# Patient Record
Sex: MALE | Race: WHITE | Employment: OTHER | URBAN - METROPOLITAN AREA
[De-identification: names, ages, dates, MRNs, and addresses within clinical notes are randomized per-mention and may not be internally consistent; named-entity substitution may affect disease eponyms.]

---

## 2018-02-27 ENCOUNTER — OFFICE VISIT (OUTPATIENT)
Dept: FAMILY MEDICINE | Facility: CLINIC | Age: 58
End: 2018-02-27
Payer: COMMERCIAL

## 2018-02-27 VITALS
OXYGEN SATURATION: 100 % | RESPIRATION RATE: 16 BRPM | BODY MASS INDEX: 35.53 KG/M2 | HEART RATE: 92 BPM | TEMPERATURE: 98.2 F | DIASTOLIC BLOOD PRESSURE: 88 MMHG | WEIGHT: 248.2 LBS | HEIGHT: 70 IN | SYSTOLIC BLOOD PRESSURE: 138 MMHG

## 2018-02-27 DIAGNOSIS — Z13.220 LIPID SCREENING: ICD-10-CM

## 2018-02-27 DIAGNOSIS — G44.201 ACUTE INTRACTABLE TENSION-TYPE HEADACHE: Primary | ICD-10-CM

## 2018-02-27 DIAGNOSIS — Z12.11 SPECIAL SCREENING FOR MALIGNANT NEOPLASMS, COLON: ICD-10-CM

## 2018-02-27 DIAGNOSIS — Z13.1 SCREENING FOR DIABETES MELLITUS: ICD-10-CM

## 2018-02-27 DIAGNOSIS — Z11.59 ENCOUNTER FOR HEPATITIS C SCREENING TEST FOR LOW RISK PATIENT: ICD-10-CM

## 2018-02-27 PROBLEM — Z71.89 ADVANCED DIRECTIVES, COUNSELING/DISCUSSION: Status: ACTIVE | Noted: 2018-02-27

## 2018-02-27 PROCEDURE — 99213 OFFICE O/P EST LOW 20 MIN: CPT | Performed by: FAMILY MEDICINE

## 2018-02-27 ASSESSMENT — PAIN SCALES - GENERAL: PAINLEVEL: MILD PAIN (3)

## 2018-02-27 NOTE — MR AVS SNAPSHOT
After Visit Summary   2/27/2018    Augusto Meeks    MRN: [id number]           Patient Information     Date Of Birth          1960        Visit Information        Provider Department      2/27/2018 2:15 PM Singh Valencia MD [facility] [place]        Today's Diagnoses     Acute intractable tension-type headache    -  1    Special screening for malignant neoplasms, colon        Encounter for hepatitis C screening test for low risk patient        Lipid screening        Screening for diabetes mellitus           Follow-ups after your visit        Additional Services     GASTROENTEROLOGY ADULT REF PROCEDURE ONLY [facility] [phone]       Last Lab Result: No results found for: CR  There is no height or weight on file to calculate BMI.      Patient will be contacted to schedule procedure.     Please be aware that coverage of these services is subject to the terms and limitations of your health insurance plan.  Call member services at your health plan with any benefit or coverage questions.  Any procedures must be performed at a [place] facility OR coordinated by your clinic's referral office.    Please bring the following with you to your appointment:    (1) Any X-Rays, CTs or MRIs which have been performed.  Contact the facility where they were done to arrange for  prior to your scheduled appointment.    (2) List of current medications   (3) This referral request   (4) Any documents/labs given to you for this referral                  Future tests that were ordered for you today     Open Future Orders        Priority Expected Expires Ordered    Hepatitis C Screen Reflex to HCV RNA Quant and Genotype Routine  2/27/2019 2/27/2018    Lipid panel reflex to direct LDL Fasting Routine  2/27/2019 2/27/2018    GASTROENTEROLOGY ADULT REF PROCEDURE ONLY [facility] [phone] Routine  2/27/2019 2/27/2018    Basic metabolic panel Routine  2/27/2019 2/27/2018            Who  "to contact     If you have questions or need follow up information about today's clinic visit or your schedule please contact [facility] directly at [phone].  Normal or non-critical lab and imaging results will be communicated to you by MyChart, letter or phone within 4 business days after the clinic has received the results. If you do not hear from us within 7 days, please contact the clinic through MyChart or phone. If you have a critical or abnormal lab result, we will notify you by phone as soon as possible.  Submit refill requests through [employer] or call your pharmacy and they will forward the refill request to us. Please allow 3 business days for your refill to be completed.          Additional Information About Your Visit        [employer] Information     [employer] gives you secure access to your electronic health record. If you see a primary care provider, you can also send messages to your care team and make appointments. If you have questions, please call your primary care clinic.  If you do not have a primary care provider, please call [phone] and they will assist you.        Care EveryWhere ID     This is your Care EveryWhere ID. This could be used by other organizations to access your [place] medical records  [id number]        Your Vitals Were     Pulse Temperature Respirations Height Pulse Oximetry BMI (Body Mass Index)    92 98.2  F (36.8  C) (Oral) 16 5' 10\" (1.778 m) 100% 35.61 kg/m2       Blood Pressure from Last 3 Encounters:   02/27/18 138/88    Weight from Last 3 Encounters:   02/27/18 248 lb 3.2 oz (112.6 kg)               Primary Care Provider Office Phone # Fax #    Singh Valencia [phone] [address] [address] [facility] [address]        Equal Access to Services     [facility] SHARLA : Hadii jack Varner, waaxda luqadaha, qaybta kaalmada jaylon, kate matthews. So [facility] [phone].    ATENCIÓN: Si habla " español, tiene a rosas disposición servicios gratuitos de asistencia lingüística. Xiomara kennedy [phone].    We comply with applicable federal civil rights laws and [place] laws. We do not discriminate on the basis of race, color, national origin, age, disability, sex, sexual orientation, or gender identity.            Thank you!     Thank you for choosing [facility]  for your care. Our goal is always to provide you with excellent care. Hearing back from our patients is one way we can continue to improve our services. Please take a few minutes to complete the written survey that you may receive in the mail after your visit with us. Thank you!             Your Updated Medication List - Protect others around you: Learn how to safely use, store and throw away your medicines at www.disposemymeds.org.      Notice  As of 2/27/2018  2:53 PM    You have not been prescribed any medications.

## 2018-02-27 NOTE — PROGRESS NOTES
"SUBJECTIVE:  57 year old.The patient has a history of headaches.  This started one week ago. Location posterior neck. And had been in the front of the head quality dull Associated symptoms slight lightheaded..  Brought on by unknown .  Better with nothing and has tried antihistamines. ROS no fever, chills, visual symptoms       Reviewed health maintenance  Patient Active Problem List   Diagnosis     Advanced directives, counseling/discussion     History reviewed. No pertinent past medical history.    OBJECTIVE:  no apparent distress  /88  Pulse 92  Temp 98.2  F (36.8  C) (Oral)  Resp 16  Ht 5' 10\" (1.778 m)  Wt 248 lb 3.2 oz (112.6 kg)  SpO2 100%  BMI 35.61 kg/m2  PERRLA EOM intact  tms clear  No tmj tenderness.  Full range of motion neck         ICD-10-CM    1. Acute intractable tension-type headache G44.201    2. Special screening for malignant neoplasms, colon Z12.11 GASTROENTEROLOGY ADULT REF PROCEDURE ONLY [facility] [phone]   3. Encounter for hepatitis C screening test for low risk patient Z11.59 Hepatitis C Screen Reflex to HCV RNA Quant and Genotype   4. Lipid screening Z13.220 Lipid panel reflex to direct LDL Fasting   5. Screening for diabetes mellitus Z13.1 Basic metabolic panel    PLAN: NSAID'S ice/heat stretching.  Recheck one month if not improved      "

## 2018-02-27 NOTE — NURSING NOTE
"Chief Complaint   Patient presents with     Headache     x 1 week, neck, temple pain .  No known injury        Initial BP (!) 155/98  Pulse 92  Temp 98.2  F (36.8  C) (Oral)  Resp 16  Ht 5' 10\" (1.778 m)  Wt 248 lb 3.2 oz (112.6 kg)  SpO2 100%  BMI 35.61 kg/m2 Estimated body mass index is 35.61 kg/(m^2) as calculated from the following:    Height as of this encounter: 5' 10\" (1.778 m).    Weight as of this encounter: 248 lb 3.2 oz (112.6 kg).  Medication Reconciliation: complete  Adriel Morales CMA    "

## 2018-02-28 DIAGNOSIS — Z13.220 LIPID SCREENING: ICD-10-CM

## 2018-02-28 DIAGNOSIS — Z13.1 SCREENING FOR DIABETES MELLITUS: ICD-10-CM

## 2018-02-28 DIAGNOSIS — Z11.59 ENCOUNTER FOR HEPATITIS C SCREENING TEST FOR LOW RISK PATIENT: ICD-10-CM

## 2018-02-28 LAB
ANION GAP SERPL CALCULATED.3IONS-SCNC: 10 MMOL/L (ref 3–14)
BUN SERPL-MCNC: 15 MG/DL (ref 7–30)
CALCIUM SERPL-MCNC: 9 MG/DL (ref 8.5–10.1)
CHLORIDE SERPL-SCNC: 103 MMOL/L (ref 94–109)
CHOLEST SERPL-MCNC: 190 MG/DL
CO2 SERPL-SCNC: 25 MMOL/L (ref 20–32)
CREAT SERPL-MCNC: 0.89 MG/DL (ref 0.66–1.25)
GFR SERPL CREATININE-BSD FRML MDRD: 88 ML/MIN/1.7M2
GLUCOSE SERPL-MCNC: 110 MG/DL (ref 70–99)
HDLC SERPL-MCNC: 50 MG/DL
LDLC SERPL CALC-MCNC: 113 MG/DL
NONHDLC SERPL-MCNC: 140 MG/DL
POTASSIUM SERPL-SCNC: 4.4 MMOL/L (ref 3.4–5.3)
SODIUM SERPL-SCNC: 138 MMOL/L (ref 133–144)
TRIGL SERPL-MCNC: 133 MG/DL

## 2018-02-28 PROCEDURE — 80048 BASIC METABOLIC PNL TOTAL CA: CPT | Performed by: FAMILY MEDICINE

## 2018-02-28 PROCEDURE — 36415 COLL VENOUS BLD VENIPUNCTURE: CPT | Performed by: FAMILY MEDICINE

## 2018-02-28 PROCEDURE — 86803 HEPATITIS C AB TEST: CPT | Performed by: FAMILY MEDICINE

## 2018-02-28 PROCEDURE — 80061 LIPID PANEL: CPT | Performed by: FAMILY MEDICINE

## 2018-02-28 NOTE — PROGRESS NOTES
"  SUBJECTIVE:   CC: Augusto Meeks is an 57 year old male who presents for preventative health visit.     Healthy Habits:    Do you get at least three servings of calcium containing foods daily (dairy, green leafy vegetables, etc.)? {YES/NO, DAIRY INTAKE:095216::\"yes\"}    Amount of exercise or daily activities, outside of work: {AMOUNT EXERCISE:376348}    Problems taking medications regularly {Yes /No default:798301::\"No\"}    Medication side effects: {Yes /No default.:629401::\"No\"}    Have you had an eye exam in the past two years? {YESNOBLANK:087079}    Do you see a dentist twice per year? {YESNOBLANK:392512}    Do you have sleep apnea, excessive snoring or daytime drowsiness?{YESNOBLANK:181369}  {Outside tests to abstract? :649139}     {additional problems to add (Optional):202233}    Today's PHQ-2 Score:   PHQ-2 ( 1999 Pfizer) 2/27/2018   Q1: Little interest or pleasure in doing things 0   Q2: Feeling down, depressed or hopeless 0   PHQ-2 Score 0     {PHQ-2 LOOK IN ASSESSMENTS :600693}  Abuse: Current or Past(Physical, Sexual or Emotional)- {YES/NO/NA:529479}  Do you feel safe in your environment - {YES/NO/NA:127675}    Social History   Substance Use Topics     Smoking status: Former Smoker     Smokeless tobacco: Never Used     Alcohol use Yes      If you drink alcohol do you typically have >3 drinks per day or >7 drinks per week? {ETOH :668710}                      Last PSA: No results found for: PSA    Reviewed orders with patient. Reviewed health maintenance and updated orders accordingly - {Yes/No:282573::\"Yes\"}  {Chronicprobdata (Optional):293336}    Reviewed and updated as needed this visit by clinical staff         Reviewed and updated as needed this visit by Provider        {HISTORY OPTIONS (Optional):750233}    ROS:  {MALE ROS-adult preventive care package:408936::\"C: NEGATIVE for fever, chills, change in weight\",\"I: NEGATIVE for worrisome rashes, moles or lesions\",\"E: NEGATIVE for vision changes or " "irritation\",\"ENT: NEGATIVE for ear, mouth and throat problems\",\"R: NEGATIVE for significant cough or SOB\",\"CV: NEGATIVE for chest pain, palpitations or peripheral edema\",\"GI: NEGATIVE for nausea, abdominal pain, heartburn, or change in bowel habits\",\" male: negative for dysuria, hematuria, decreased urinary stream, erectile dysfunction, urethral discharge\",\"M: NEGATIVE for significant arthralgias or myalgia\",\"N: NEGATIVE for weakness, dizziness or paresthesias\",\"P: NEGATIVE for changes in mood or affect\"}    OBJECTIVE:   There were no vitals taken for this visit.  EXAM:  {Exam Choices:522580}    ASSESSMENT/PLAN:   {Diag Picklist:607480}    COUNSELING:  {MALE COUNSELING MESSAGES:318025::\"Reviewed preventive health counseling, as reflected in patient instructions\"}    {BP Counseling- Complete if BP >= 120/80  (Optional):236589}   reports that he has quit smoking. He has never used smokeless tobacco.  {Tobacco Cessation -- Complete if patient is a smoker (Optional):416057}  Estimated body mass index is 35.61 kg/(m^2) as calculated from the following:    Height as of 2/27/18: 5' 10\" (1.778 m).    Weight as of 2/27/18: 248 lb 3.2 oz (112.6 kg).   {Weight Management Plan (ACO) Complete if BMI is abnormal-  Ages 18-64  BMI >24.9.  Age 65+ with BMI <23 or >30 (Optional):305121}    Counseling Resources:  ATP IV Guidelines  Pooled Cohorts Equation Calculator  FRAX Risk Assessment  ICSI Preventive Guidelines  Dietary Guidelines for Americans, 2010  USDA's MyPlate  ASA Prophylaxis  Lung CA Screening    Singh Valencia MD  [facility]  "

## 2018-03-01 LAB — HCV AB SERPL QL IA: NONREACTIVE

## 2018-03-07 ENCOUNTER — HOSPITAL ENCOUNTER (OUTPATIENT)
Facility: AMBULATORY SURGERY CENTER | Age: 58
End: 2018-03-07
Attending: SPECIALIST | Admitting: SPECIALIST
Payer: COMMERCIAL

## 2018-03-08 ENCOUNTER — OFFICE VISIT (OUTPATIENT)
Dept: FAMILY MEDICINE | Facility: CLINIC | Age: 58
End: 2018-03-08
Payer: COMMERCIAL

## 2018-03-08 VITALS
DIASTOLIC BLOOD PRESSURE: 85 MMHG | OXYGEN SATURATION: 100 % | HEART RATE: 95 BPM | WEIGHT: 244 LBS | BODY MASS INDEX: 35.01 KG/M2 | SYSTOLIC BLOOD PRESSURE: 138 MMHG | RESPIRATION RATE: 16 BRPM | TEMPERATURE: 98.2 F

## 2018-03-08 DIAGNOSIS — Z00.00 ROUTINE GENERAL MEDICAL EXAMINATION AT A HEALTH CARE FACILITY: Primary | ICD-10-CM

## 2018-03-08 PROCEDURE — 99396 PREV VISIT EST AGE 40-64: CPT | Performed by: FAMILY MEDICINE

## 2018-03-08 ASSESSMENT — PAIN SCALES - GENERAL: PAINLEVEL: NO PAIN (0)

## 2018-03-08 NOTE — MR AVS SNAPSHOT
After Visit Summary   3/8/2018    Augusto Meeks    MRN: [id number]           Patient Information     Date Of Birth          1960        Visit Information        Provider Department      3/8/2018 10:45 AM Singh Valencia MD [facility] [place]        Today's Diagnoses     Routine general medical examination at a health care facility    -  1      Care Instructions      Preventive Health Recommendations  Male Ages 50 - 64    Yearly exam:             See your health care provider every year in order to  o   Review health changes.   o   Discuss preventive care.    o   Review your medicines if your doctor has prescribed any.     Have a cholesterol test every 5 years, or more frequently if you are at risk for high cholesterol/heart disease.     Have a diabetes test (fasting glucose) every three years. If you are at risk for diabetes, you should have this test more often.     Have a colonoscopy at age 50, or have a yearly FIT test (stool test). These exams will check for colon cancer.      Talk with your health care provider about whether or not a prostate cancer screening test (PSA) is right for you.    You should be tested each year for STDs (sexually transmitted diseases), if you re at risk.     Shots: Get a flu shot each year. Get a tetanus shot every 10 years.     Nutrition:    Eat at least 5 servings of fruits and vegetables daily.     Eat whole-grain bread, whole-wheat pasta and brown rice instead of white grains and rice.     Talk to your provider about Calcium and Vitamin D.     Lifestyle    Exercise for at least 150 minutes a week (30 minutes a day, 5 days a week). This will help you control your weight and prevent disease.     Limit alcohol to one drink per day.     No smoking.     Wear sunscreen to prevent skin cancer.     See your dentist every six months for an exam and cleaning.     See your eye doctor every 1 to 2 years.            Follow-ups after your visit        Your next  10 appointments already scheduled     May 21, 2018   Procedure with Jad Kerr MD   [facility] (--)    [address] [place] [address] [phone]              Who to contact     If you have questions or need follow up information about today's clinic visit or your schedule please contact [facility] directly at [phone].  Normal or non-critical lab and imaging results will be communicated to you by [email or web address], letter or phone within 4 business days after the clinic has received the results. If you do not hear from us within 7 days, please contact the clinic through [employer] or phone. If you have a critical or abnormal lab result, we will notify you by phone as soon as possible.  Submit refill requests through [employer] or call your pharmacy and they will forward the refill request to us. Please allow 3 business days for your refill to be completed.          Additional Information About Your Visit        [email or web address] Information     [employer] gives you secure access to your electronic health record. If you see a primary care provider, you can also send messages to your care team and make appointments. If you have questions, please call your primary care clinic.  If you do not have a primary care provider, please call [phone] and they will assist you.        Care EveryWhere ID     This is your Care EveryWhere ID. This could be used by other organizations to access your [place] medical records  [id number]        Your Vitals Were     Pulse Temperature Respirations Pulse Oximetry BMI (Body Mass Index)       95 98.2  F (36.8  C) (Oral) 16 100% 35.01 kg/m2        Blood Pressure from Last 3 Encounters:   03/08/18 138/85   02/27/18 138/88    Weight from Last 3 Encounters:   03/08/18 244 lb (110.7 kg)   02/27/18 248 lb 3.2 oz (112.6 kg)              Today, you had the following     No orders found for display       Primary Care Provider Office Phone # Fax #    Singh Mead  MD Erik [phone]       [address] [facility] [address]        Equal Access to Services     LANG MAGDALENO : Hadii aad ku hadleathaeran Varner, adamdez gusmanjoseha, edteodoro cartermaria ada hernanrico, kate rodriguesomr becerraray rizo laMarcelloalfred matthews. So [facility] [phone].    ATENCIÓN: Si habla español, tiene a rosas disposición servicios gratuitos de asistencia lingüística. Llame al [phone].    We comply with applicable federal civil rights laws and [place] laws. We do not discriminate on the basis of race, color, national origin, age, disability, sex, sexual orientation, or gender identity.            Thank you!     Thank you for choosing [facility]  for your care. Our goal is always to provide you with excellent care. Hearing back from our patients is one way we can continue to improve our services. Please take a few minutes to complete the written survey that you may receive in the mail after your visit with us. Thank you!             Your Updated Medication List - Protect others around you: Learn how to safely use, store and throw away your medicines at www.disposemymeds.org.      Notice  As of 3/8/2018 12:54 PM    You have not been prescribed any medications.

## 2018-03-08 NOTE — PROGRESS NOTES
SUBJECTIVE:   CC: Augusto Meeks is an 57 year old male who presents for preventative health visit.     Physical   Annual:     Getting at least 3 servings of Calcium per day::  NO    Bi-annual eye exam::  NO    Dental care twice a year::  NO    Sleep apnea or symptoms of sleep apnea::  None    Diet::  Regular (no restrictions)    Frequency of exercise::  1 day/week    Duration of exercise::  15-30 minutes    Taking medications regularly::  Not Applicable    Additional concerns today::  No                     Today's PHQ-2 Score:   PHQ-2 ( 1999 Pfizer) 3/8/2018   Q1: Little interest or pleasure in doing things 0   Q2: Feeling down, depressed or hopeless 0   PHQ-2 Score 0   Q1: Little interest or pleasure in doing things Not at all   Q2: Feeling down, depressed or hopeless Not at all   PHQ-2 Score 0       Abuse: Current or Past(Physical, Sexual or Emotional)- No  Do you feel safe in your environment - Yes    Social History   Substance Use Topics     Smoking status: Former Smoker     Smokeless tobacco: Never Used     Alcohol use Yes     Alcohol Use 3/8/2018   AUDIT SCORE  10     AUDIT - Alcohol Use Disorders Identification Test - Reproduced from the World Health Organization Audit 2001 (Second Edition) 3/8/2018   1.  How often do you have a drink containing alcohol? 2 to 3 times a week   2.  How many drinks containing alcohol do you have on a typical day when you are drinking? 3 or 4   3.  How often do you have five or more drinks on one occasion? Weekly   4.  How often during the last year have you found that you were not able to stop drinking once you had started? Never   5.  How often during the last year have you failed to do what was normally expected of you because of drinking? Never   6.  How often during the last year have you needed a first drink in the morning to get yourself going after a heavy drinking session? Never   7.  How often during the last year have you had a feeling of guilt or remorse after  drinking? Never   8.  How often during the last year have you been unable to remember what happened the night before because of your drinking? Less than monthly   9.  Have you or someone else been injured because of your drinking? No   10. Has a relative, friend, doctor or other health care worker been concerned about your drinking or suggested you cut down? Yes, but not in the last year   TOTAL SCORE 10       Last PSA: No results found for: PSA    Reviewed orders with patient. Reviewed health maintenance and updated orders accordingly - Yes       Reviewed and updated as needed this visit by clinical staff  Tobacco  Allergies  Meds  Med Hx  Surg Hx  Fam Hx  Soc Hx        Reviewed and updated as needed this visit by Provider            Review of Systems  C: NEGATIVE for fever, chills, change in weight  I: NEGATIVE for worrisome rashes, moles or lesions  E: NEGATIVE for vision changes or irritation  ENT: NEGATIVE for ear, mouth and throat problems  R: NEGATIVE for significant cough or SOB  CV: NEGATIVE for chest pain, palpitations or peripheral edema  GI: NEGATIVE for nausea, abdominal pain, heartburn, or change in bowel habits   male: negative for dysuria, hematuria, decreased urinary stream, erectile dysfunction, urethral discharge  M: NEGATIVE for significant arthralgias or myalgia  N: NEGATIVE for weakness, dizziness or paresthesias  P: NEGATIVE for changes in mood or affect    OBJECTIVE:   /85  Pulse 95  Temp 98.2  F (36.8  C) (Oral)  Resp 16  Wt 244 lb (110.7 kg)  SpO2 100%  BMI 35.01 kg/m2    Physical Exam  GENERAL: healthy, alert and no distress  EYES: Eyes grossly normal to inspection, PERRL and conjunctivae and sclerae normal  HENT: ear canals and TM's normal, nose and mouth without ulcers or lesions  NECK: no adenopathy, no asymmetry, masses, or scars and thyroid normal to palpation  RESP: lungs clear to auscultation - no rales, rhonchi or wheezes  CV: regular rate and rhythm, normal S1  "S2, no S3 or S4, no murmur, click or rub, no peripheral edema and peripheral pulses strong  ABDOMEN: soft, nontender, no hepatosplenomegaly, no masses and bowel sounds normal  MS: no gross musculoskeletal defects noted, no edema  SKIN: no suspicious lesions or rashes  NEURO: Normal strength and tone, mentation intact and speech normal  PSYCH: mentation appears normal, affect normal/bright    ASSESSMENT/PLAN:       ICD-10-CM    1. Routine general medical examination at a health care facility Z00.00        COUNSELING:   Reviewed preventive health counseling, as reflected in patient instructions       Regular exercise       Healthy diet/nutrition         reports that he has quit smoking. He has never used smokeless tobacco.    Estimated body mass index is 35.01 kg/(m^2) as calculated from the following:    Height as of 2/27/18: 5' 10\" (1.778 m).    Weight as of this encounter: 244 lb (110.7 kg).   Weight management plan:  less calories    Counseling Resources:  ATP IV Guidelines  Pooled Cohorts Equation Calculator  FRAX Risk Assessment  ICSI Preventive Guidelines  Dietary Guidelines for Americans, 2010  USDA's MyPlate  ASA Prophylaxis  Lung CA Screening    Singh Valencia MD  [facility]  Answers for HPI/ROS submitted by the patient on 3/8/2018   PHQ-2 Score: 0    "

## 2018-03-08 NOTE — NURSING NOTE
"Chief Complaint   Patient presents with     Physical       Initial BP (!) 152/97  Pulse 95  Temp 98.2  F (36.8  C) (Oral)  Resp 16  Wt 244 lb (110.7 kg)  SpO2 100%  BMI 35.01 kg/m2 Estimated body mass index is 35.01 kg/(m^2) as calculated from the following:    Height as of 2/27/18: 5' 10\" (1.778 m).    Weight as of this encounter: 244 lb (110.7 kg).  Medication Reconciliation: complete  Adriel Morales CMA    "

## 2019-09-16 ENCOUNTER — OFFICE VISIT (OUTPATIENT)
Dept: URGENT CARE | Facility: URGENT CARE | Age: 59
End: 2019-09-16
Payer: COMMERCIAL

## 2019-09-16 VITALS
HEIGHT: 72 IN | WEIGHT: 246 LBS | OXYGEN SATURATION: 99 % | TEMPERATURE: 98.2 F | HEART RATE: 98 BPM | BODY MASS INDEX: 33.32 KG/M2 | SYSTOLIC BLOOD PRESSURE: 176 MMHG | DIASTOLIC BLOOD PRESSURE: 96 MMHG

## 2019-09-16 DIAGNOSIS — I48.91 ATRIAL FIBRILLATION, UNSPECIFIED TYPE (H): Primary | ICD-10-CM

## 2019-09-16 DIAGNOSIS — I49.9 IRREGULAR HEART BEAT: ICD-10-CM

## 2019-09-16 PROCEDURE — 93000 ELECTROCARDIOGRAM COMPLETE: CPT | Performed by: NURSE PRACTITIONER

## 2019-09-16 PROCEDURE — 99215 OFFICE O/P EST HI 40 MIN: CPT | Performed by: NURSE PRACTITIONER

## 2019-09-16 ASSESSMENT — MIFFLIN-ST. JEOR: SCORE: 1978.85

## 2019-09-16 NOTE — PROGRESS NOTES
SUBJECTIVE:  Augusto Meeks is a 58 year old male who presents to the office with the CC of chest pain.  Patient complains of irregular heart beats and felt sweaty earlier today.  This has been going on 1 day.   His blood pressure has been very high today as well, not on any medication for that.   There is no chest pain, shortness of breath.      No past medical history on file.    No current outpatient medications on file.    Social History     Tobacco Use     Smoking status: Former Smoker     Smokeless tobacco: Never Used   Substance Use Topics     Alcohol use: Yes       ROS:CONSTITUTIONAL:NEGATIVE for fever, chills, change in weight  INTEGUMENTARY/SKIN: NEGATIVE for worrisome rashes, moles or lesions  EYES: NEGATIVE for vision changes or irritation  ENT/MOUTH: NEGATIVE for ear, mouth and throat problems  RESP:NEGATIVE for significant cough or SOB  CV: POSITIVE for irregular heart beat  GI: NEGATIVE for nausea, abdominal pain, heartburn, or change in bowel habits  : negative for dysuria, hematuria, decreased urinary stream, erectile dysfunction  MUSCULOSKELETAL: NEGATIVE for significant arthralgias or myalgia  NEURO: NEGATIVE for weakness, dizziness or paresthesias  ENDOCRINE: NEGATIVE for temperature intolerance, skin/hair changes  HEME/ALLERGY/IMMUNE: NEGATIVE for bleeding problems  PSYCHIATRIC: NEGATIVE for changes in mood or affect    EXAM:  BP (!) 176/96 (BP Location: Right arm, Patient Position: Sitting, Cuff Size: Adult Regular)   Pulse 98   Temp 98.2  F (36.8  C) (Oral)   Ht 1.829 m (6')   Wt 111.6 kg (246 lb)   SpO2 99%   BMI 33.36 kg/m    GENERAL APPEARANCE: alert and no distress  EYES: EOMI,  PERRL, conjunctiva clear  HENT: ear canals and TM's normal.  Nose and mouth without ulcers, erythema or lesions  NECK: supple, nontender, no lymphadenopathy  RESP: lungs clear to auscultation - no rales, rhonchi or wheezes  CV: regular rates and rhythm, normal S1 S2, no murmur noted  ABDOMEN:  soft,  nontender, no HSM or masses and bowel sounds normal  NEURO: Normal strength and tone, sensory exam grossly normal,  normal speech and mentation  SKIN: no suspicious lesions or rashes     Office EKG demonstrates:  Atrial fibrilation    Assessment  / IMPRESSION  (I48.91) Atrial fibrillation, unspecified type (H)  (primary encounter diagnosis)  (I49.9) Irregular heart beat    PLAN: Discussed ekg results, advised he should be seen in ER  Declines ambulance wife will drive him now    JELANI Champion CNP

## 2019-09-18 ENCOUNTER — TELEPHONE (OUTPATIENT)
Dept: CARDIOLOGY | Facility: CLINIC | Age: 59
End: 2019-09-18

## 2019-09-18 DIAGNOSIS — I48.91 ATRIAL FIBRILLATION (H): Primary | ICD-10-CM

## 2019-09-18 NOTE — TELEPHONE ENCOUNTER
Spoke with pt, reviewed ED visit at [facility], EKG on Monday.   Pt confirmed he started xarelto and lopressor 50 mg bid.   Asymptomatic. Denies chest pain, SOB.   No imaging done to date.   Pt stated he has no cardiac history and no prev episodes of afib. No family hx of SCD.     Offered several options for appointments.   Pt confirmed appt with Dr Clement Monday 9/23 at 1:30  Echocardiogram scheduled to follow at 3:00 pm.  Confirmed date and location of appt with pt.   Verbalized understanding

## 2019-09-18 NOTE — TELEPHONE ENCOUNTER
[facility] Call Center    Phone Message    May a detailed message be left on voicemail: yes    Reason for Call: Other: Pt was seen in the ED at [facility] on Monday for new onset A-Fib, and they recommended he follow up with a Cardiologist by the end of the week. At the moment the soonest available appointment isn't until next Tuesday. Please give him a call back to discuss possibly getting in for a sooner appointment.     Action Taken: Message routed to:  [facility] ([facility]): Cardiology

## 2019-09-23 ENCOUNTER — ANCILLARY PROCEDURE (OUTPATIENT)
Dept: CARDIOLOGY | Facility: CLINIC | Age: 59
End: 2019-09-23
Attending: INTERNAL MEDICINE
Payer: COMMERCIAL

## 2019-09-23 ENCOUNTER — OFFICE VISIT (OUTPATIENT)
Dept: CARDIOLOGY | Facility: CLINIC | Age: 59
End: 2019-09-23
Payer: COMMERCIAL

## 2019-09-23 VITALS — DIASTOLIC BLOOD PRESSURE: 89 MMHG | HEART RATE: 85 BPM | OXYGEN SATURATION: 96 % | SYSTOLIC BLOOD PRESSURE: 146 MMHG

## 2019-09-23 DIAGNOSIS — I48.91 NEW ONSET ATRIAL FIBRILLATION (H): Primary | ICD-10-CM

## 2019-09-23 DIAGNOSIS — I48.91 ATRIAL FIBRILLATION (H): ICD-10-CM

## 2019-09-23 DIAGNOSIS — I48.91 NEW ONSET ATRIAL FIBRILLATION (H): ICD-10-CM

## 2019-09-23 LAB — TSH SERPL DL<=0.005 MIU/L-ACNC: 6.72 MU/L (ref 0.4–4)

## 2019-09-23 PROCEDURE — 93000 ELECTROCARDIOGRAM COMPLETE: CPT | Performed by: INTERNAL MEDICINE

## 2019-09-23 PROCEDURE — 36415 COLL VENOUS BLD VENIPUNCTURE: CPT | Performed by: INTERNAL MEDICINE

## 2019-09-23 PROCEDURE — 93306 TTE W/DOPPLER COMPLETE: CPT | Performed by: INTERNAL MEDICINE

## 2019-09-23 PROCEDURE — 84443 ASSAY THYROID STIM HORMONE: CPT | Performed by: INTERNAL MEDICINE

## 2019-09-23 PROCEDURE — 99205 OFFICE O/P NEW HI 60 MIN: CPT | Performed by: INTERNAL MEDICINE

## 2019-09-23 RX ORDER — METOPROLOL TARTRATE 50 MG
50 TABLET ORAL
COMMUNITY
Start: 2019-09-16 | End: 2019-10-14

## 2019-09-23 RX ORDER — POTASSIUM CHLORIDE 1500 MG/1
20 TABLET, EXTENDED RELEASE ORAL
Status: CANCELLED | OUTPATIENT
Start: 2019-09-23

## 2019-09-23 RX ORDER — POTASSIUM CHLORIDE 1500 MG/1
40 TABLET, EXTENDED RELEASE ORAL
Status: CANCELLED | OUTPATIENT
Start: 2019-09-23

## 2019-09-23 RX ORDER — LIDOCAINE 40 MG/G
CREAM TOPICAL
Status: CANCELLED | OUTPATIENT
Start: 2019-09-23

## 2019-09-23 NOTE — Clinical Note
9/23/2019      RE: Augusto Meeks  [address] [place] [facility] [address]       Dear Colleague,    Thank you for the opportunity to participate in the care of your patient, Augusto Meeks, at the [facility]. Please see a copy of my visit note below.    CARDIOLOGY CLINIC FOLLOW UP    HPI: See dictated note    PAST MEDICAL HISTORY:  History reviewed. No pertinent past medical history.    CURRENT MEDICATIONS:  Current Outpatient Medications   Medication Sig Dispense Refill     amoxicillin-clavulanate (AUGMENTIN) 875-125 MG tablet        metoprolol tartrate (LOPRESSOR) 50 MG tablet Take 50 mg by mouth       rivaroxaban ANTICOAGULANT (XARELTO) 20 MG TABS tablet Take 20 mg by mouth         PAST SURGICAL HISTORY:  Past Surgical History:   Procedure Laterality Date     VASECTOMY         ALLERGIES  No Known Allergies    FAMILY HX:  Family History   Problem Relation Age of Onset     Lung Cancer Mother      Cancer Father        SOCIAL HX:  Social History     Socioeconomic History     Marital status:      Spouse name: None     Number of children: None     Years of education: None     Highest education level: None   Occupational History     None   Social Needs     Financial resource strain: None     Food insecurity:     Worry: None     Inability: None     Transportation needs:     Medical: None     Non-medical: None   Tobacco Use     Smoking status: Former Smoker     Packs/day: 0.00     Smokeless tobacco: Never Used   Substance and Sexual Activity     Alcohol use: Yes     Drug use: Yes     Types: Marijuana     Sexual activity: Yes     Partners: Female   Lifestyle     Physical activity:     Days per week: None     Minutes per session: None     Stress: None   Relationships     Social connections:     Talks on phone: None     Gets together: None     Attends Zoroastrianism service: None     Active member of club or organization:  None     Attends meetings of clubs or organizations: None     Relationship status: None     Intimate partner violence:     Fear of current or ex partner: None     Emotionally abused: None     Physically abused: None     Forced sexual activity: None   Other Topics Concern     Parent/sibling w/ CABG, MI or angioplasty before 65F 55M? No   Social History Narrative     None       ROS:  Constitutional: No fever, chills, or sweats. No weight gain/loss.   ENT: No visual disturbance, ear ache, epistaxis, sore throat.   Allergies/Immunologic: Negative.   Respiratory: No cough, hemoptysis.   Cardiovascular: As per HPI.   GI: No nausea, vomiting, hematemesis, melena, or hematochezia.   : No urinary frequency, dysuria, or hematuria.   Integument: Negative.   Psychiatric: Negative.   Neuro: Negative.   Endocrinology: Negative.   Musculoskeletal: No myalgia.    VITAL SIGNS:  BP (!) 149/100 (BP Location: Left arm, Patient Position: Sitting, Cuff Size: Adult Large)   Pulse 85   SpO2 96%   There is no height or weight on file to calculate BMI.  Wt Readings from Last 2 Encounters:   09/16/19 111.6 kg (246 lb)   03/08/18 110.7 kg (244 lb)       PHYSICAL EXAM  Augusto Meeks is a 58 year old male in no acute distress.  HEENT: Unremarkable.  Neck: JVP normal.  Carotids +4/4 bilaterally without bruits.  Lungs: CTA.  Cor: irregular.  No murmur, rub, or gallop.  PMI in Lf 5th ICS.  Abd: Soft, nontender, nondistended.  NABS.  No pulsatile mass.  Extremities: No C/C/E.  Pulses +4/4 symmetric in upper and lower extremities.  Neuro: Grossly intact.    LABS    No results found for: WBC  No results found for: RBC  No results found for: HGB  No results found for: HCT  No components found for: MCT  No results found for: MCV  No results found for: MCH  No results found for: MCHC  No results found for: RDW  No results found for: PLT   Recent Labs   Lab Test 02/28/18  1000      POTASSIUM 4.4   CHLORIDE 103   CO2 25   ANIONGAP 10   *    BUN 15   CR 0.89   CHALINO 9.0     Recent Labs   Lab Test 02/28/18  1000   CHOL 190   HDL 50   *   TRIG 133        ASSESSMENT AND PLAN:  See dictated note                Visit Date:   09/23/2019      ELECTROPHYSIOLOGY SPECIALTY CONSULTATION      PURPOSE OF VISIT:  The patient presents as a new patient for evaluation of new onset atrial fibrillation.      HISTORY OF PRESENT ILLNESS:  Mr. Augusto Meeks is a 58-year-old gentleman with a medical history significant for hypertension.  The patient denies any history of diabetes, coronary heart disease, previous strokes or TIAs or heart failure.      On 09/16/2019, the patient presented to a clinic for evaluation of otitis media.  He was found to have an irregular pulse and was seen at the Emergency Department at [facility]  An ECG done confirmed atrial fibrillation.  The patient was started on metoprolol 50 mg twice daily and Xarelto 20 mg once daily and was referred here for further evaluation.      Of note, the patient is asymptomatic with respect to his atrial fibrillation.  Denies symptoms of palpitations, irregular heartbeat sensation, exertional dyspnea, exertional angina, frequent lightheadedness, presyncope or syncope.      ASSESSMENT AND PLAN:  New onset atrial fibrillation.      I discussed extensively with the patient and his wife the implications and management options for atrial fibrillation.  I recommended the following next steps.  We will obtain a 12-lead ECG today to assess his heart rhythm.  We will check a TSH.  The patient will undergo a transthoracic echocardiogram later this afternoon.      I discussed the aims, risks, and alternatives to a DOT-guided cardioversion.  I recommended this as a next step to determine whether or not we are able to convert him to normal sinus rhythm.  We will schedule this DOT-guided cardioversion sometime this week.  I will see him back in clinic and depending on his rhythm, we will decide on the next steps.       All questions and concerns were addressed, and patient is happy with plan.  The plan has also been communicated to the patient's primary care provider.                  GIORGI QUILES MD             D: 2019   T: 2019   MT: ANSELMO      Name:     OSIRIS MOORE   MRN:      [id number]        Account:      [id number]   :      1960           Visit Date:   2019      Document: [id number]       Please do not hesitate to contact me if you have any questions/concerns.     Sincerely,     Giorgi Quiles MD

## 2019-09-23 NOTE — PROGRESS NOTES
CARDIOLOGY CLINIC FOLLOW UP    HPI: See dictated note    PAST MEDICAL HISTORY:  History reviewed. No pertinent past medical history.    CURRENT MEDICATIONS:  Current Outpatient Medications   Medication Sig Dispense Refill     amoxicillin-clavulanate (AUGMENTIN) 875-125 MG tablet        metoprolol tartrate (LOPRESSOR) 50 MG tablet Take 50 mg by mouth       rivaroxaban ANTICOAGULANT (XARELTO) 20 MG TABS tablet Take 20 mg by mouth         PAST SURGICAL HISTORY:  Past Surgical History:   Procedure Laterality Date     VASECTOMY         ALLERGIES  No Known Allergies    FAMILY HX:  Family History   Problem Relation Age of Onset     Lung Cancer Mother      Cancer Father        SOCIAL HX:  Social History     Socioeconomic History     Marital status:      Spouse name: None     Number of children: None     Years of education: None     Highest education level: None   Occupational History     None   Social Needs     Financial resource strain: None     Food insecurity:     Worry: None     Inability: None     Transportation needs:     Medical: None     Non-medical: None   Tobacco Use     Smoking status: Former Smoker     Packs/day: 0.00     Smokeless tobacco: Never Used   Substance and Sexual Activity     Alcohol use: Yes     Drug use: Yes     Types: Marijuana     Sexual activity: Yes     Partners: Female   Lifestyle     Physical activity:     Days per week: None     Minutes per session: None     Stress: None   Relationships     Social connections:     Talks on phone: None     Gets together: None     Attends Denominational service: None     Active member of club or organization: None     Attends meetings of clubs or organizations: None     Relationship status: None     Intimate partner violence:     Fear of current or ex partner: None     Emotionally abused: None     Physically abused: None     Forced sexual activity: None   Other Topics Concern     Parent/sibling w/ CABG, MI or angioplasty before 65F 55M? No   Social History  Narrative     None       ROS:  Constitutional: No fever, chills, or sweats. No weight gain/loss.   ENT: No visual disturbance, ear ache, epistaxis, sore throat.   Allergies/Immunologic: Negative.   Respiratory: No cough, hemoptysis.   Cardiovascular: As per HPI.   GI: No nausea, vomiting, hematemesis, melena, or hematochezia.   : No urinary frequency, dysuria, or hematuria.   Integument: Negative.   Psychiatric: Negative.   Neuro: Negative.   Endocrinology: Negative.   Musculoskeletal: No myalgia.    VITAL SIGNS:  BP (!) 149/100 (BP Location: Left arm, Patient Position: Sitting, Cuff Size: Adult Large)   Pulse 85   SpO2 96%   There is no height or weight on file to calculate BMI.  Wt Readings from Last 2 Encounters:   09/16/19 111.6 kg (246 lb)   03/08/18 110.7 kg (244 lb)       PHYSICAL EXAM  Augusto Meeks is a 58 year old male in no acute distress.  HEENT: Unremarkable.  Neck: JVP normal.  Carotids +4/4 bilaterally without bruits.  Lungs: CTA.  Cor: irregular.  No murmur, rub, or gallop.  PMI in Lf 5th ICS.  Abd: Soft, nontender, nondistended.  NABS.  No pulsatile mass.  Extremities: No C/C/E.  Pulses +4/4 symmetric in upper and lower extremities.  Neuro: Grossly intact.    LABS    No results found for: WBC  No results found for: RBC  No results found for: HGB  No results found for: HCT  No components found for: MCT  No results found for: MCV  No results found for: MCH  No results found for: MCHC  No results found for: RDW  No results found for: PLT   Recent Labs   Lab Test 02/28/18  1000      POTASSIUM 4.4   CHLORIDE 103   CO2 25   ANIONGAP 10   *   BUN 15   CR 0.89   CHALINO 9.0     Recent Labs   Lab Test 02/28/18  1000   CHOL 190   HDL 50   *   TRIG 133        ASSESSMENT AND PLAN:  See dictated note

## 2019-09-23 NOTE — NURSING NOTE
Chief Complaint   Patient presents with     Heart Problem     ED at [facility]- new onset afib, EKG done at PCP 9/16- see EKG, afib, rate 100. Pt started on xarelto and metoprolol tartrate 50 mg bid. Asymptomatic.  Pt reports SOB, chest pressure, lightheadedness, and fatigue.       Initial BP (!) 149/100 (BP Location: Left arm, Patient Position: Sitting, Cuff Size: Adult Large)   Pulse 85   SpO2 96%  Estimated body mass index is 33.36 kg/m  as calculated from the following:    Height as of 9/16/19: 1.829 m (6').    Weight as of 9/16/19: 111.6 kg (246 lb)..  BP completed using cuff size: large    Kacie Nieves L.P.N.  Ekg. Test procedure explained to patient .Ekg.test performed today per Provider order.Then Ekg. Result relayed  to provider for review.  Kacie Nieves L.P.N.

## 2019-09-23 NOTE — PATIENT INSTRUCTIONS
Thank you for coming to the [facility] [place]; please note the following instructions:    1.)  You are scheduled for a Transesophageal Echocardiogram followed by a Cardioversion at the [facility] ([address], [facility] [address], [phone]).       Follow these instructions:    1. Report to the GOLD waiting room in the [facility] hospital on: ___9/25/19 7:00 am_______    2. Do not eat or drink 6 hours prior to arrival.    3. The morning of your procedure you may take your scheduled medications with a SIP of water. If you take diabetic medications or a diuretic, you may hold these.     4. You will receive medication that makes you sleepy; you will need a  and someone to stay with you for 24 hours following this procedure.    You should not make any legal decisions for 24 hours following discharge.       If you have further questions, please utilize [employer] to contact us.   If your question concerns the above instructions, contact:  Beronica Montoya RN  Electrophysiology Nurse Coordinator.  [phone]    If your question concerns the schedule/appointment times, contact:  [facility]   [phone]        2. Preventive Care:    Colorectal Cancer Screening: During our visit today, we discussed that it is recommended you receive colorectal cancer screening. Please call or make an appointment with your primary care provider to discuss this. You may also call the  [employer] scheduling line ([phone]) to set up a colonoscopy appointment.    3. Lab. Visit today         If you have any questions regarding your visit please contact your care team:     Cardiology  Telephone Number   ISREAL Vasquez,ISREAL PARRISH, MAYRA SOLIS, FAUSTINO TRAN LPN   [phone]    *After hours: [phone]   For scheduling appts:     [phone] or    [phone] (select option 1)    *After hours: [phone]     For the Device Clinic (Pacemakers and  ICD's)  RN's :  Arleth Scanlon   During business hours: [phone]    *After business hours:  [phone] (select option 4)      Normal test result notifications will be released via [employer] or mailed within 7 business days.  All other test results, will be communicated via telephone once reviewed by your cardiologist.    If you need a medication refill please contact your pharmacy.  Please allow 3 business days for your refill to be completed.    As always, thank you for trusting us with your health care needs!        Patient Education     Electrical Cardioversion     Cut away image of heart showing SA node, right atrium, AV node, and left atrium   You had a cardioversion today. This is an electrical shock applied to the chest. The shock reset your heart rhythm back to normal. Your chest wall and chest muscles may feel sore for a few days. Some redness may appear on the skin on your chest where the cardioversion patches were applied. That will go away within a week.  To get ready for this procedure, you may have been given medicine to help you relax and to reduce pain. Depending on the medicine used, it could take up to 8 hours for the effect to wear off.  Home care  Follow these guidelines when caring for yourself at home:    You should be watched by a responsible adult for the next 8 hours. This is in case your condition gets worse.    Don t take any oral medicine for pain or sleep during the next 4 hours. These medicines might react with the medicines you were given in the hospital. This can cause a much stronger response than usual.    Don t drink any alcohol for the next 24 hours.    Don t drive or operate dangerous machinery during the next 24 hours.    Your healthcare provider will have prescribed medicines to stop the abnormal heart rhythm from coming back. Take these medicines as directed. Expect to take blood thinners for at least 4 weeks. This course of blood thinners should not be interrupted.  Do not schedule other invasive procedures during this time. Interrupting this medicine could increase your risk for a stroke after a cardioversion.    You may use acetaminophen or ibuprofen to control pain, unless another pain medicine was prescribed. If you have chronic liver or kidney disease, talk with your provider before taking these medicines. Also talk with your provider if you ve had a stomach ulcer or gastrointestinal bleeding.  Follow-up care  Follow up with your healthcare provider, or as advised, if you aren t alert and back to your usual level of activity within 12 hours.   Call 911  Call 911 if you have:     Pain in your chest, arm, shoulder, neck or upper back    You have problems speaking or seeing    Weakness in an arm or leg    You are unable to move your arm or leg on one side of your body    You have uncrontrolled bleeding from blood thinning medicines   When to seek medical advice  Call your healthcare provider right away if any of these occur:    Weakness, dizziness, lightheadedness, or fainting    Shortness of breath    You feel like your heart is fluttering or beating fast, hard, or irregularly (palpitations)    More than minor skin discomfort or redness where the cardioversion pads were placed   Date Last Reviewed: 1/1/2017 2000-2018 [employer]. [address] [place] [address]. All rights reserved. This information is not intended as a substitute for professional medical care. Always follow your healthcare professional's instructions.           Patient Education     Transesophageal Echocardiography (DOT)      Transesophageal echocardiography (DOT) is a test done to record images of your heart with a probe inside your esophagus. These images help your healthcare provider find and treat problems such as infection, disease, or defects in your heart s function, walls or valves. This test may be done when a chest echocardiogram (transthoracic) does not give your  provider enough information.  Before your test    Tell your provider about all the medicines you take. Ask if it s OK to take them before the test.    Don t eat or drink for 6 to 8 hours before the test. This includes water.    Tell your healthcare provider if you have ulcers, a hiatal hernia, or problems swallowing. Also report a history of narrowing of the esophagus, or any other previous gastrointestinal problems.  Also, let him or her know of any allergies to medicines or sedatives.    Also let your provider know if you have dental implants or dentures that should be removed before the test.    Arrange to have someone drive you home after the exam.  During your DOT    When you arrive for your DOT, you will change into a hospital gown, and then be taken to the testing room.    Your provider will spray your throat with a numbing medicine. You may be given a medicine through an IV (intravenous) in your arm to help you relax. You may also be given oxygen. Then you ll be asked to lie on your left side.    The healthcare provider gently inserts the small, lubricated probe into your mouth. As you swallow, he or she will slowly guide the tube into your esophagus.    You may feel the healthcare provider moving the probe, but it shouldn t hurt or interfere with your breathing. A nurse checks your heart rate, blood pressure, and breathing. The test usually takes 20 to 40 minutes.    The nurse or assistant will suction any saliva out of your mouth, similar to when you have a dental cleaning.  After the test    Tell your healthcare provider about any pain, or if you cough up or vomit blood, or have trouble swallowing.    You can eat and drink again when your throat is no longer numb.    Do not drive a car or run heavy machinery for at least 24 hours after getting sedation. After 24 hours you can return to normal activity unless your healthcare provider tells you otherwise.    Be sure to keep your follow-up appointment to go  over the results with your healthcare provider.    Your next appointment is: ____________________  Date Last Reviewed: 12/1/2016 2000-2018 [employer]. [address] [place] [address], [place] [address]. All rights reserved. This information is not intended as a substitute for professional medical care. Always follow your healthcare professional's instructions.

## 2019-09-23 NOTE — LETTER
9/23/2019       RE: Augusto Meeks  [address] [facility] [address]     Dear Colleague,    Thank you for referring your patient, Augusto Meeks, to the [facility] [place] [facility] at [facility]. Please see a copy of my visit note below.    CARDIOLOGY CLINIC FOLLOW UP    HPI: See dictated note    PAST MEDICAL HISTORY:  History reviewed. No pertinent past medical history.    CURRENT MEDICATIONS:  Current Outpatient Medications   Medication Sig Dispense Refill     amoxicillin-clavulanate (AUGMENTIN) 875-125 MG tablet        metoprolol tartrate (LOPRESSOR) 50 MG tablet Take 50 mg by mouth       rivaroxaban ANTICOAGULANT (XARELTO) 20 MG TABS tablet Take 20 mg by mouth         PAST SURGICAL HISTORY:  Past Surgical History:   Procedure Laterality Date     VASECTOMY         ALLERGIES  No Known Allergies    FAMILY HX:  Family History   Problem Relation Age of Onset     Lung Cancer Mother      Cancer Father        SOCIAL HX:  Social History     Socioeconomic History     Marital status:      Spouse name: None     Number of children: None     Years of education: None     Highest education level: None   Occupational History     None   Social Needs     Financial resource strain: None     Food insecurity:     Worry: None     Inability: None     Transportation needs:     Medical: None     Non-medical: None   Tobacco Use     Smoking status: Former Smoker     Packs/day: 0.00     Smokeless tobacco: Never Used   Substance and Sexual Activity     Alcohol use: Yes     Drug use: Yes     Types: Marijuana     Sexual activity: Yes     Partners: Female   Lifestyle     Physical activity:     Days per week: None     Minutes per session: None     Stress: None   Relationships     Social connections:     Talks on phone: None     Gets together: None     Attends Yarsani service: None     Active member of club or organization: None     Attends meetings of clubs or  organizations: None     Relationship status: None     Intimate partner violence:     Fear of current or ex partner: None     Emotionally abused: None     Physically abused: None     Forced sexual activity: None   Other Topics Concern     Parent/sibling w/ CABG, MI or angioplasty before 65F 55M? No   Social History Narrative     None       ROS:  Constitutional: No fever, chills, or sweats. No weight gain/loss.   ENT: No visual disturbance, ear ache, epistaxis, sore throat.   Allergies/Immunologic: Negative.   Respiratory: No cough, hemoptysis.   Cardiovascular: As per HPI.   GI: No nausea, vomiting, hematemesis, melena, or hematochezia.   : No urinary frequency, dysuria, or hematuria.   Integument: Negative.   Psychiatric: Negative.   Neuro: Negative.   Endocrinology: Negative.   Musculoskeletal: No myalgia.    VITAL SIGNS:  BP (!) 149/100 (BP Location: Left arm, Patient Position: Sitting, Cuff Size: Adult Large)   Pulse 85   SpO2 96%   There is no height or weight on file to calculate BMI.  Wt Readings from Last 2 Encounters:   09/16/19 111.6 kg (246 lb)   03/08/18 110.7 kg (244 lb)       PHYSICAL EXAM  Augusto Meeks is a 58 year old male in no acute distress.  HEENT: Unremarkable.  Neck: JVP normal.  Carotids +4/4 bilaterally without bruits.  Lungs: CTA.  Cor: irregular.  No murmur, rub, or gallop.  PMI in Lf 5th ICS.  Abd: Soft, nontender, nondistended.  NABS.  No pulsatile mass.  Extremities: No C/C/E.  Pulses +4/4 symmetric in upper and lower extremities.  Neuro: Grossly intact.    LABS    No results found for: WBC  No results found for: RBC  No results found for: HGB  No results found for: HCT  No components found for: MCT  No results found for: MCV  No results found for: MCH  No results found for: MCHC  No results found for: RDW  No results found for: PLT   Recent Labs   Lab Test 02/28/18  1000      POTASSIUM 4.4   CHLORIDE 103   CO2 25   ANIONGAP 10   *   BUN 15   CR 0.89   CHALINO 9.0      Recent Labs   Lab Test 02/28/18  1000   CHOL 190   HDL 50   *   TRIG 133        ASSESSMENT AND PLAN:  See dictated note                Visit Date:   09/23/2019      ELECTROPHYSIOLOGY SPECIALTY CONSULTATION      PURPOSE OF VISIT:  The patient presents as a new patient for evaluation of new onset atrial fibrillation.      HISTORY OF PRESENT ILLNESS:  Mr. Augusto Meeks is a 58-year-old gentleman with a medical history significant for hypertension.  The patient denies any history of diabetes, coronary heart disease, previous strokes or TIAs or heart failure.      On 09/16/2019, the patient presented to a clinic for evaluation of otitis media.  He was found to have an irregular pulse and was seen at the Emergency Department at [facility].  An ECG done confirmed atrial fibrillation.  The patient was started on metoprolol 50 mg twice daily and Xarelto 20 mg once daily and was referred here for further evaluation.      Of note, the patient is asymptomatic with respect to his atrial fibrillation.  Denies symptoms of palpitations, irregular heartbeat sensation, exertional dyspnea, exertional angina, frequent lightheadedness, presyncope or syncope.      ASSESSMENT AND PLAN:  New onset atrial fibrillation.      I discussed extensively with the patient and his wife the implications and management options for atrial fibrillation.  I recommended the following next steps.  We will obtain a 12-lead ECG today to assess his heart rhythm.  We will check a TSH.  The patient will undergo a transthoracic echocardiogram later this afternoon.      I discussed the aims, risks, and alternatives to a DOT-guided cardioversion.  I recommended this as a next step to determine whether or not we are able to convert him to normal sinus rhythm.  We will schedule this DOT-guided cardioversion sometime this week.  I will see him back in clinic and depending on his rhythm, we will decide on the next steps.      All questions and concerns  were addressed, and patient is happy with plan.  The plan has also been communicated to the patient's primary care provider.                  GIORGI QUILES MD             D: 2019   T: 2019   MT: ANSELMO      Name:     OSIRIS MOORE   MRN:      [id number]        Account:      [id number]   :      1960           Visit Date:   2019      Document: [id number]

## 2019-09-24 NOTE — PROGRESS NOTES
Visit Date:   09/23/2019      ELECTROPHYSIOLOGY SPECIALTY CONSULTATION      PURPOSE OF VISIT:  The patient presents as a new patient for evaluation of new onset atrial fibrillation.      HISTORY OF PRESENT ILLNESS:  Mr. Augusto Meeks is a 58-year-old gentleman with a medical history significant for hypertension.  The patient denies any history of diabetes, coronary heart disease, previous strokes or TIAs or heart failure.      On 09/16/2019, the patient presented to a clinic for evaluation of otitis media.  He was found to have an irregular pulse and was seen at the Emergency Department at [facility].  An ECG done confirmed atrial fibrillation.  The patient was started on metoprolol 50 mg twice daily and Xarelto 20 mg once daily and was referred here for further evaluation.      Of note, the patient is asymptomatic with respect to his atrial fibrillation.  Denies symptoms of palpitations, irregular heartbeat sensation, exertional dyspnea, exertional angina, frequent lightheadedness, presyncope or syncope.      ASSESSMENT AND PLAN:  New onset atrial fibrillation.      I discussed extensively with the patient and his wife the implications and management options for atrial fibrillation.  I recommended the following next steps.  We will obtain a 12-lead ECG today to assess his heart rhythm.  We will check a TSH.  The patient will undergo a transthoracic echocardiogram later this afternoon.      I discussed the aims, risks, and alternatives to a DOT-guided cardioversion.  I recommended this as a next step to determine whether or not we are able to convert him to normal sinus rhythm.  We will schedule this DOT-guided cardioversion sometime this week.  I will see him back in clinic and depending on his rhythm, we will decide on the next steps.      All questions and concerns were addressed, and patient is happy with plan.  The plan has also been communicated to the patient's primary care provider.                  GIORGI  BEVERLY QUILES MD             D: 2019   T: 2019   MT: ANSELMO      Name:     OSIRIS MOORE   MRN:      [id number]        Account:      [id number]   :      1960           Visit Date:   2019      Document: [id number]

## 2019-09-25 ENCOUNTER — APPOINTMENT (OUTPATIENT)
Dept: MEDSURG UNIT | Facility: CLINIC | Age: 59
End: 2019-09-25
Attending: RADIOLOGY
Payer: COMMERCIAL

## 2019-09-25 ENCOUNTER — ANESTHESIA (OUTPATIENT)
Dept: SURGERY | Facility: CLINIC | Age: 59
End: 2019-09-25
Payer: COMMERCIAL

## 2019-09-25 ENCOUNTER — HOSPITAL ENCOUNTER (OUTPATIENT)
Dept: CARDIOLOGY | Facility: CLINIC | Age: 59
End: 2019-09-25
Attending: INTERNAL MEDICINE | Admitting: RADIOLOGY
Payer: COMMERCIAL

## 2019-09-25 ENCOUNTER — ANESTHESIA EVENT (OUTPATIENT)
Dept: SURGERY | Facility: CLINIC | Age: 59
End: 2019-09-25
Payer: COMMERCIAL

## 2019-09-25 ENCOUNTER — APPOINTMENT (OUTPATIENT)
Dept: LAB | Facility: CLINIC | Age: 59
End: 2019-09-25
Attending: RADIOLOGY
Payer: COMMERCIAL

## 2019-09-25 ENCOUNTER — HOSPITAL ENCOUNTER (OUTPATIENT)
Facility: CLINIC | Age: 59
Discharge: HOME | End: 2019-09-25
Attending: RADIOLOGY | Admitting: RADIOLOGY
Payer: COMMERCIAL

## 2019-09-25 VITALS
DIASTOLIC BLOOD PRESSURE: 69 MMHG | SYSTOLIC BLOOD PRESSURE: 119 MMHG | OXYGEN SATURATION: 97 % | RESPIRATION RATE: 18 BRPM

## 2019-09-25 VITALS
DIASTOLIC BLOOD PRESSURE: 88 MMHG | HEART RATE: 85 BPM | OXYGEN SATURATION: 98 % | RESPIRATION RATE: 17 BRPM | SYSTOLIC BLOOD PRESSURE: 105 MMHG

## 2019-09-25 VITALS
SYSTOLIC BLOOD PRESSURE: 128 MMHG | OXYGEN SATURATION: 96 % | HEART RATE: 60 BPM | RESPIRATION RATE: 18 BRPM | DIASTOLIC BLOOD PRESSURE: 82 MMHG

## 2019-09-25 DIAGNOSIS — I48.91 NEW ONSET ATRIAL FIBRILLATION (H): ICD-10-CM

## 2019-09-25 LAB
INR PPP: 1.39 (ref 0.86–1.14)
INTERPRETATION ECG - MUSE: NORMAL
MAGNESIUM SERPL-MCNC: 2.1 MG/DL (ref 1.6–2.3)
POTASSIUM SERPL-SCNC: 4.4 MMOL/L (ref 3.4–5.3)

## 2019-09-25 PROCEDURE — 25000128 H RX IP 250 OP 636: Performed by: INTERNAL MEDICINE

## 2019-09-25 PROCEDURE — 25000125 ZZHC RX 250: Performed by: INTERNAL MEDICINE

## 2019-09-25 PROCEDURE — 25800025 ZZH RX 258: Performed by: INTERNAL MEDICINE

## 2019-09-25 PROCEDURE — 92960 CARDIOVERSION ELECTRIC EXT: CPT | Performed by: NURSE PRACTITIONER

## 2019-09-25 PROCEDURE — 36415 COLL VENOUS BLD VENIPUNCTURE: CPT | Performed by: INTERNAL MEDICINE

## 2019-09-25 PROCEDURE — 25000125 ZZHC RX 250: Performed by: NURSE ANESTHETIST, CERTIFIED REGISTERED

## 2019-09-25 PROCEDURE — 93312 ECHO TRANSESOPHAGEAL: CPT | Mod: 26 | Performed by: INTERNAL MEDICINE

## 2019-09-25 PROCEDURE — 37000008 ZZH ANESTHESIA TECHNICAL FEE, 1ST 30 MIN

## 2019-09-25 PROCEDURE — 93325 DOPPLER ECHO COLOR FLOW MAPG: CPT

## 2019-09-25 PROCEDURE — 93325 DOPPLER ECHO COLOR FLOW MAPG: CPT | Mod: 26 | Performed by: INTERNAL MEDICINE

## 2019-09-25 PROCEDURE — 92960 CARDIOVERSION ELECTRIC EXT: CPT

## 2019-09-25 PROCEDURE — 93010 ELECTROCARDIOGRAM REPORT: CPT | Mod: 76 | Performed by: INTERNAL MEDICINE

## 2019-09-25 PROCEDURE — 85610 PROTHROMBIN TIME: CPT | Performed by: INTERNAL MEDICINE

## 2019-09-25 PROCEDURE — 93320 DOPPLER ECHO COMPLETE: CPT | Mod: 26 | Performed by: INTERNAL MEDICINE

## 2019-09-25 PROCEDURE — 25800030 ZZH RX IP 258 OP 636: Performed by: INTERNAL MEDICINE

## 2019-09-25 PROCEDURE — 92960 CARDIOVERSION ELECTRIC EXT: CPT | Performed by: INTERNAL MEDICINE

## 2019-09-25 PROCEDURE — 40000065 ZZH STATISTIC EKG NON-CHARGEABLE

## 2019-09-25 PROCEDURE — 83735 ASSAY OF MAGNESIUM: CPT | Performed by: INTERNAL MEDICINE

## 2019-09-25 PROCEDURE — 84132 ASSAY OF SERUM POTASSIUM: CPT | Performed by: INTERNAL MEDICINE

## 2019-09-25 PROCEDURE — 99152 MOD SED SAME PHYS/QHP 5/>YRS: CPT

## 2019-09-25 PROCEDURE — 40000166 ZZH STATISTIC PP CARE STAGE 1

## 2019-09-25 RX ORDER — FENTANYL CITRATE 50 UG/ML
25 INJECTION, SOLUTION INTRAMUSCULAR; INTRAVENOUS
Status: DISCONTINUED | OUTPATIENT
Start: 2019-09-25 | End: 2019-09-26 | Stop reason: HOSPADM

## 2019-09-25 RX ORDER — ACYCLOVIR 200 MG/1
3 CAPSULE ORAL ONCE
Status: COMPLETED | OUTPATIENT
Start: 2019-09-25 | End: 2019-09-25

## 2019-09-25 RX ORDER — FLUMAZENIL 0.1 MG/ML
0.2 INJECTION, SOLUTION INTRAVENOUS
Status: DISCONTINUED | OUTPATIENT
Start: 2019-09-25 | End: 2019-09-26 | Stop reason: HOSPADM

## 2019-09-25 RX ORDER — ATROPINE SULFATE 0.1 MG/ML
.5-1 INJECTION INTRAVENOUS
Status: DISCONTINUED | OUTPATIENT
Start: 2019-09-25 | End: 2019-09-26 | Stop reason: HOSPADM

## 2019-09-25 RX ORDER — NALOXONE HYDROCHLORIDE 0.4 MG/ML
.1-.4 INJECTION, SOLUTION INTRAMUSCULAR; INTRAVENOUS; SUBCUTANEOUS
Status: DISCONTINUED | OUTPATIENT
Start: 2019-09-25 | End: 2019-09-26 | Stop reason: HOSPADM

## 2019-09-25 RX ORDER — SODIUM CHLORIDE 9 MG/ML
1000 INJECTION, SOLUTION INTRAVENOUS CONTINUOUS
Status: DISCONTINUED | OUTPATIENT
Start: 2019-09-25 | End: 2019-09-26 | Stop reason: HOSPADM

## 2019-09-25 RX ORDER — POTASSIUM CHLORIDE 1500 MG/1
20 TABLET, EXTENDED RELEASE ORAL
Status: DISCONTINUED | OUTPATIENT
Start: 2019-09-25 | End: 2019-09-26 | Stop reason: HOSPADM

## 2019-09-25 RX ORDER — POTASSIUM CHLORIDE 1500 MG/1
40 TABLET, EXTENDED RELEASE ORAL
Status: DISCONTINUED | OUTPATIENT
Start: 2019-09-25 | End: 2019-09-26 | Stop reason: HOSPADM

## 2019-09-25 RX ORDER — LIDOCAINE HYDROCHLORIDE 20 MG/ML
15 SOLUTION OROPHARYNGEAL ONCE
Status: COMPLETED | OUTPATIENT
Start: 2019-09-25 | End: 2019-09-25

## 2019-09-25 RX ORDER — FENTANYL CITRATE 50 UG/ML
50 INJECTION, SOLUTION INTRAMUSCULAR; INTRAVENOUS ONCE
Status: DISCONTINUED | OUTPATIENT
Start: 2019-09-25 | End: 2019-09-26 | Stop reason: HOSPADM

## 2019-09-25 RX ADMIN — FENTANYL CITRATE 50 MCG: 50 INJECTION INTRAMUSCULAR; INTRAVENOUS at 09:14

## 2019-09-25 RX ADMIN — FENTANYL CITRATE 25 MCG: 50 INJECTION INTRAMUSCULAR; INTRAVENOUS at 09:16

## 2019-09-25 RX ADMIN — FENTANYL CITRATE 50 MCG: 50 INJECTION INTRAMUSCULAR; INTRAVENOUS at 09:12

## 2019-09-25 RX ADMIN — LIDOCAINE HYDROCHLORIDE 30 ML: 20 SOLUTION ORAL; TOPICAL at 09:05

## 2019-09-25 RX ADMIN — SODIUM CHLORIDE 3 ML: 9 INJECTION, SOLUTION INTRAMUSCULAR; INTRAVENOUS; SUBCUTANEOUS at 09:30

## 2019-09-25 RX ADMIN — TOPICAL ANESTHETIC 0.5 ML: 200 SPRAY DENTAL; PERIODONTAL at 09:05

## 2019-09-25 RX ADMIN — SODIUM CHLORIDE 400 ML: 9 INJECTION, SOLUTION INTRAVENOUS at 10:03

## 2019-09-25 RX ADMIN — METHOHEXITAL SODIUM 70 MG: 500 INJECTION, POWDER, LYOPHILIZED, FOR SOLUTION INTRAMUSCULAR; INTRAVENOUS; RECTAL at 09:51

## 2019-09-25 RX ADMIN — MIDAZOLAM 1 MG: 1 INJECTION INTRAMUSCULAR; INTRAVENOUS at 09:14

## 2019-09-25 RX ADMIN — MIDAZOLAM 1 MG: 1 INJECTION INTRAMUSCULAR; INTRAVENOUS at 09:19

## 2019-09-25 RX ADMIN — MIDAZOLAM 1 MG: 1 INJECTION INTRAMUSCULAR; INTRAVENOUS at 09:12

## 2019-09-25 RX ADMIN — MIDAZOLAM 1 MG: 1 INJECTION INTRAMUSCULAR; INTRAVENOUS at 09:16

## 2019-09-25 ASSESSMENT — ENCOUNTER SYMPTOMS: DYSRHYTHMIAS: 1

## 2019-09-25 NOTE — DISCHARGE INSTRUCTIONS
Going Home after Sedation      For 24 hours:    An adult should stay with you.    Relax and take it easy.    DO NOT make any important legal decisions.    DO NOT drive or operate machines at home or at work.    Resume your regular diet and drink plenty of fluids.    If you have any redness/skin sorness where the patches were placed, you may use aloe vera gel as needed to help relieve local pain.     CALL THE PHYSICIAN IF:    You develop nausea or vomiting    You develop hives or a rash or any unexplained itching    ADDITIONAL INFORMATION:  Cardiovascular Clinic:   [address] [place] [address]. [place] [address]  Your Care Team:  [facility]   Telephone Number     Dr. Vanessa Clement [phone]   Iris Chavez RN   [phone]     For scheduling appts or procedures:    Silvia Molina   [phone]     As always, Thank you for trusting us with your health care needs!

## 2019-09-25 NOTE — PROGRESS NOTES
1020  Here from Echo per cart post DOT & Cardioversion.  Denies any pain or needs now.  Chest & Back areas are   slightly red.  NPO until 11AM.  Wife, Nadeen, is here at bedside.  CTRN

## 2019-09-25 NOTE — PROGRESS NOTES
Pt tolerating po well.  Pt ambulated in the hallway, tolerated well.  PIV D/C'ed, catheter intact.  discharge instructions went over with and given to pt by ECHO nurse, pt has no further questions.  PIV D/C'ed, catheter intact. 1130-Pt D/C'ed to home with his family.

## 2019-09-25 NOTE — ANESTHESIA PREPROCEDURE EVALUATION
Anesthesia Pre-Procedure Evaluation    Patient: Augusto Meeks   MRN:     [id number] Gender:   male   Age:    58 year old :      1960        Preoperative Diagnosis: Atrial fibrillation, unspecified type (H) [I48.91]   Procedure(s):  Anesthesia Coverage Transesophageal Echocardiogram, Cardioversion @0930     No past medical history on file.   Past Surgical History:   Procedure Laterality Date     VASECTOMY            Anesthesia Evaluation     . Pt has not had prior anesthetic            ROS/MED HX    ENT/Pulmonary:     (+)GABRIEL risk factors snores loudly, obese, , . .    Neurologic:       Cardiovascular:     (+) hypertension----. : . . . :. dysrhythmias a-fib, .       METS/Exercise Tolerance:     Hematologic:         Musculoskeletal:         GI/Hepatic:         Renal/Genitourinary:         Endo:         Psychiatric:         Infectious Disease:         Malignancy:         Other:                         PHYSICAL EXAM:   Mental Status/Neuro: A/A/O   Airway: Facies: Thick Neck  Mallampati: III  Mouth/Opening: Full  TM distance: > 6 cm  Neck ROM: Full   Respiratory: Auscultation: CTAB     Resp. Rate: Normal     Resp. Effort: Normal      CV: Rhythm: Regular  Rate: Age appropriate  Heart: Normal Sounds  Edema: None   Comments:      Dental: Normal Dentition                LABS:  CBC: No results found for: WBC, HGB, HCT, PLT  BMP:   Lab Results   Component Value Date     2018    POTASSIUM 4.4 2018    CHLORIDE 103 2018    CO2 25 2018    BUN 15 2018    CR 0.89 2018     (H) 2018     COAGS: No results found for: PTT, INR, FIBR  POC: No results found for: BGM, HCG, HCGS  OTHER:   Lab Results   Component Value Date    CHALINO 9.0 2018    TSH 6.72 (H) 2019        Preop Vitals    BP Readings from Last 3 Encounters:   19 (!) 146/89   19 (!) 176/96   18 138/85    Pulse Readings from Last 3 Encounters:   19 85   19 98   18 95       Resp Readings from Last 3 Encounters:   03/08/18 16   02/27/18 16    SpO2 Readings from Last 3 Encounters:   09/23/19 96%   09/16/19 99%   03/08/18 100%      Temp Readings from Last 1 Encounters:   09/16/19 36.8  C (98.2  F) (Oral)    Ht Readings from Last 1 Encounters:   09/16/19 1.829 m (6')      Wt Readings from Last 1 Encounters:   09/16/19 111.6 kg (246 lb)    Estimated body mass index is 33.36 kg/m  as calculated from the following:    Height as of 9/16/19: 1.829 m (6').    Weight as of 9/16/19: 111.6 kg (246 lb).     LDA:        Assessment:   ASA SCORE: 2    H&P: History and physical reviewed and following examination; no interval change.   Smoking Status:  Non-Smoker/Unknown   NPO Status: NPO Appropriate     Plan:   Anes. Type:  MAC   Pre-Medication: None   Induction:  N/a   Airway: Native Airway   Access/Monitoring: PIV   Maintenance: N/a (methohexital)     Postop Plan:   Postop Pain: None  Postop Sedation/Airway: Not planned  Disposition: Outpatient     PONV Management:   Adult Risk Factors:, Non-Smoker     CONSENT: Direct conversation   Plan and risks discussed with: Patient          Comments for Plan/Consent:  ______________________________________________________________________  I discussed the risks and benefits of MAC with deep sedation with the patient.  Questions were sought and answered.      Orlando Caruso MD  Attending Anesthesiologist                   Orlando Caruso MD

## 2019-09-25 NOTE — ANESTHESIA POSTPROCEDURE EVALUATION
Anesthesia POST Procedure Evaluation    Patient: Augusto Meeks   MRN:     [id number] Gender:   male   Age:    58 year old :      1960        Preoperative Diagnosis: Atrial fibrillation, unspecified type (H) [I48.91]   Procedure(s):  Anesthesia Coverage Transesophageal Echocardiogram, Cardioversion @0930   Postop Comments: No value filed.       Anesthesia Type:  Not documented  No value filed.    Reportable Event: NO     PAIN: Uncomplicated   Sign Out status: Comfortable, Well controlled pain     PONV: No PONV   Sign Out status:  No Nausea or Vomiting     Neuro/Psych: Uneventful perioperative course   Sign Out Status: Preoperative baseline; Age appropriate mentation     Airway/Resp.: Uneventful perioperative course   Sign Out Status: Non labored breathing, age appropriate RR; Resp. Status within EXPECTED Parameters     CV: Uneventful perioperative course   Sign Out status: Appropriate BP and perfusion indices; Appropriate HR/Rhythm     Disposition:   Sign-Out Location: ECHO lab.  Recovery Course: Uneventful  Follow-Up: Not required     Comments/Narrative:  No noted anesthetic complications.  Patient satisfied with anesthetic.             Last Anesthesia Record Vitals:      Last PACU Vitals:  No vitals data found for the desired time range.        Electronically Signed By: Orlando Caruso MD, 2019, 10:50 AM

## 2019-09-25 NOTE — PROGRESS NOTES
Pt arrived in ECHO department for scheduled DOT/DCCV.   Procedure explained, questions answered and consent signed. Discharge instructions discussed with patient and given written copy.   Pt's throat sprayed at 09:00, therefore pt will not be able to eat or drink until 2 hours after at 11:00.  Informed pt of this time and encouraged to start with warm fluids and soft foods.    Pt tolerated procedure well, and was given a total of 125 mcg IV fentanyl and 4 mg IV versed for conscious sedation. Suctioned mild pink tinged sputum during DOT, no blood on probe upon removal. Pt denied any throat pain.   DOT probe 58 used for procedure.  No clots visualized on DOT so proceeded with DCCV.  Anesthesia gave pt 70 mg IV brevitol for sedation and pt was DCCV at 150 Joules to a SR. Post EKG completed and placed in chart.   Pt denied chest or throat pain after procedure and was monitored until awake and VSS. Escorted to 2A for further recovery.   Report given to ISREAL Cornell.

## 2019-09-25 NOTE — PRE-PROCEDURE
GENERAL PRE-PROCEDURE:   Procedure:  DOT Cardioversion    Verbal consent obtained?: Yes    Written consent obtained?: Yes    Risks and benefits: Risks, benefits and alternatives were discussed    Consent given by:  Patient  Patient states understanding of procedure being performed: Yes    Patient's understanding of procedure matches consent: Yes    Procedure consent matches procedure scheduled: Yes    Expected level of sedation:  Moderate  Appropriately NPO:  Yes  ASA Class:  Class 2- mild systemic disease, no acute problems, no functional limitations  Mallampati  :  Grade 2- soft palate, base of uvula, tonsillar pillars, and portion of posterior pharyngeal wall visible  Lungs:  Lungs clear with good breath sounds bilaterally  Heart:  Normal heart sounds and rate and a-fib  History & Physical reviewed:  History and physical reviewed and no updates needed  Statement of review:  I have reviewed the lab findings, diagnostic data, medications, and the plan for sedation

## 2019-09-25 NOTE — ANESTHESIA CARE TRANSFER NOTE
Patient: Augusto Meeks    Procedure(s):  Anesthesia Coverage Transesophageal Echocardiogram, Cardioversion @0930    Diagnosis: Atrial fibrillation, unspecified type (H) [I48.91]  Diagnosis Additional Information: No value filed.    Anesthesia Type:   No value filed.     Note:    Patient transferred to:Phase II  Comments: Anesthesia Care Transfer Note    Patient: Augusto Meeks    Transferred to: ECHO    Patient vital signs: stable    Airway: none    Monitors on, breathing spontaneously, report to ISREAL Marie CRNA  9/25/2019 9:58 AM    post-procedure handoff checklist not completed for medical reasons      Vitals: (Last set prior to Anesthesia Care Transfer)    CRNA VITALS  9/25/2019 0928 - 9/25/2019 0958      9/25/2019             NIBP:  119/87    Pulse:  69    SpO2:  99 %                Electronically Signed By: JELANI Sánchez CRNA  September 25, 2019  9:58 AM

## 2019-09-26 ENCOUNTER — TELEPHONE (OUTPATIENT)
Dept: CARDIOLOGY | Facility: CLINIC | Age: 59
End: 2019-09-26

## 2019-09-26 ENCOUNTER — OFFICE VISIT (OUTPATIENT)
Dept: ENDOCRINOLOGY | Facility: CLINIC | Age: 59
End: 2019-09-26
Payer: COMMERCIAL

## 2019-09-26 VITALS
SYSTOLIC BLOOD PRESSURE: 146 MMHG | DIASTOLIC BLOOD PRESSURE: 78 MMHG | HEART RATE: 64 BPM | BODY MASS INDEX: 33.39 KG/M2 | OXYGEN SATURATION: 99 % | WEIGHT: 246.2 LBS

## 2019-09-26 DIAGNOSIS — R79.89 ABNORMAL TSH: Primary | ICD-10-CM

## 2019-09-26 LAB
T3FREE SERPL-MCNC: 2.5 PG/ML (ref 2.3–4.2)
T4 FREE SERPL-MCNC: 1.05 NG/DL (ref 0.76–1.46)

## 2019-09-26 PROCEDURE — 86376 MICROSOMAL ANTIBODY EACH: CPT | Performed by: INTERNAL MEDICINE

## 2019-09-26 PROCEDURE — 99244 OFF/OP CNSLTJ NEW/EST MOD 40: CPT | Performed by: INTERNAL MEDICINE

## 2019-09-26 PROCEDURE — 84439 ASSAY OF FREE THYROXINE: CPT | Performed by: INTERNAL MEDICINE

## 2019-09-26 PROCEDURE — 84481 FREE ASSAY (FT-3): CPT | Performed by: INTERNAL MEDICINE

## 2019-09-26 PROCEDURE — 36415 COLL VENOUS BLD VENIPUNCTURE: CPT | Performed by: INTERNAL MEDICINE

## 2019-09-26 NOTE — PROGRESS NOTES
CC: I was asked by Dr Clement to consult on this patient for abnormal thyroid lab.     HPI: Patient is here for evaluation of abnormal thyroid lab.   Found on workup for new onset of atrial fibrillation.   This was an incidental finding on exam.   He was started on metoprolol and xarelto.   He had cardioversion yesterday.     No prior heart or thyroid disease.     Chronic SOB. Worse with activity.     Having some diarrhea with augmentin for recent ear infection.   No recent neck pain or swelling.     Tends to be warmer.     Weight has been gradually increased.   No recent changes in diet or exercise.     ROS: 10 point ROS neg other than the symptoms noted above in the HPI.    PMH:   Patient Active Problem List   Diagnosis     Advanced directives, counseling/discussion     Hyperlipidemia     New onset atrial fibrillation (H)     Tobacco use disorder     Meds:  Current Outpatient Medications   Medication     amoxicillin-clavulanate (AUGMENTIN) 875-125 MG tablet     metoprolol tartrate (LOPRESSOR) 50 MG tablet     rivaroxaban ANTICOAGULANT (XARELTO) 20 MG TABS tablet     No current facility-administered medications for this visit.      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   Former smoker.     Exam:   Vital signs:      BP: (!) 146/78 Pulse: 64     SpO2: 99 %       Weight: 111.7 kg (246 lb 3.2 oz)  Estimated body mass index is 33.39 kg/m  as calculated from the following:    Height as of 9/16/19: 1.829 m (6').    Weight as of this encounter: 111.7 kg (246 lb 3.2 oz).  Gen: In NAD.   HEENT: no proptosis or lid lag, EOMI, thyroid low lying w/o clear nodule.   Card: S1 S2 RRR no m/r/g. no LE edema.   Pulm: CTA b/l.   GI: NT ND +BS.   MSK: no gross deformities.   Derm: no rashes or lesions.   Neuro: no tremor, +2 DTR's.     A/P:   Abnormal TSH - Extensive discussion of thyroid hormone and normal physiology. Included was discussion of thyroid in relation to weight and energy. Currently, we just  have an elevated TSH which might be a spurious finding.   -Check Free T4 and Free T3 levels today.    -If these levels are low, we will start thyroid hormone.    -If they are normal, we will follow with labs in 1 month.   -Check TPO level.     HTN - mild elevation today. Repeat 142/80. Normal readings yesterday.   -F/U with Cardiology as planned.     Braydon Guillen MD on 9/26/2019 at 4:12 PM

## 2019-09-26 NOTE — PATIENT INSTRUCTIONS
-Check Free T4 and Free T3 levels today.    -If these levels are low, we will start thyroid hormone.    -If they are normal, we will follow with labs in 1 month.

## 2019-09-26 NOTE — TELEPHONE ENCOUNTER
Result note reviewed with patient.  Patient verbalized understanding.  Offered patient with endocrinology today in [place] at 3:00 pm and patient accepted.    Beronica Montoya RN    Notes recorded by Vanessa Clement MD on 9/25/2019 at 11:59 AM CDT  Please refer pt to Endocrinology    TSH   Date Value Ref Range Status   09/23/2019 6.72 (H) 0.40 - 4.00 mU/L Final

## 2019-09-26 NOTE — LETTER
9/26/2019         RE: Augusto Meeks  [address] [facility] [address]        Dear Colleague,    Thank you for referring your patient, Augusto Meeks, to the [facility]. Please see a copy of my visit note below.    CC: I was asked by Dr Clement to consult on this patient for abnormal thyroid lab.     HPI: Patient is here for evaluation of abnormal thyroid lab.   Found on workup for new onset of atrial fibrillation.   This was an incidental finding on exam.   He was started on metoprolol and xarelto.   He had cardioversion yesterday.     No prior heart or thyroid disease.     Chronic SOB. Worse with activity.     Having some diarrhea with augmentin for recent ear infection.   No recent neck pain or swelling.     Tends to be warmer.     Weight has been gradually increased.   No recent changes in diet or exercise.     ROS: 10 point ROS neg other than the symptoms noted above in the HPI.    PMH:   Patient Active Problem List   Diagnosis     Advanced directives, counseling/discussion     Hyperlipidemia     New onset atrial fibrillation (H)     Tobacco use disorder     Meds:  Current Outpatient Medications   Medication     amoxicillin-clavulanate (AUGMENTIN) 875-125 MG tablet     metoprolol tartrate (LOPRESSOR) 50 MG tablet     rivaroxaban ANTICOAGULANT (XARELTO) 20 MG TABS tablet     No current facility-administered medications for this visit.      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   Former smoker.     Exam:   Vital signs:      BP: (!) 146/78 Pulse: 64     SpO2: 99 %       Weight: 111.7 kg (246 lb 3.2 oz)  Estimated body mass index is 33.39 kg/m  as calculated from the following:    Height as of 9/16/19: 1.829 m (6').    Weight as of this encounter: 111.7 kg (246 lb 3.2 oz).  Gen: In NAD.   HEENT: no proptosis or lid lag, EOMI, thyroid low lying w/o clear nodule.   Card: S1 S2 RRR no m/r/g. no LE edema.   Pulm: CTA b/l.   GI: NT ND +BS.   MSK: no gross  deformities.   Derm: no rashes or lesions.   Neuro: no tremor, +2 DTR's.     A/P:   Abnormal TSH - Extensive discussion of thyroid hormone and normal physiology. Included was discussion of thyroid in relation to weight and energy. Currently, we just have an elevated TSH which might be a spurious finding.   -Check Free T4 and Free T3 levels today.    -If these levels are low, we will start thyroid hormone.    -If they are normal, we will follow with labs in 1 month.   -Check TPO level.     HTN - mild elevation today. Repeat 142/80. Normal readings yesterday.   -F/U with Cardiology as planned.     Braydon Guillen MD on 9/26/2019 at 4:12 PM        Again, thank you for allowing me to participate in the care of your patient.        Sincerely,        Braydon Guillen MD

## 2019-09-27 LAB
INTERPRETATION ECG - MUSE: NORMAL
THYROPEROXIDASE AB SERPL-ACNC: <10 IU/ML

## 2019-09-30 DIAGNOSIS — R79.89 ABNORMAL TSH: Primary | ICD-10-CM

## 2019-10-14 ENCOUNTER — OFFICE VISIT (OUTPATIENT)
Dept: CARDIOLOGY | Facility: CLINIC | Age: 59
End: 2019-10-14
Payer: COMMERCIAL

## 2019-10-14 VITALS
HEART RATE: 76 BPM | SYSTOLIC BLOOD PRESSURE: 146 MMHG | DIASTOLIC BLOOD PRESSURE: 95 MMHG | WEIGHT: 248 LBS | BODY MASS INDEX: 33.63 KG/M2 | OXYGEN SATURATION: 99 %

## 2019-10-14 DIAGNOSIS — I48.0 PAROXYSMAL ATRIAL FIBRILLATION (H): ICD-10-CM

## 2019-10-14 DIAGNOSIS — I48.19 PERSISTENT ATRIAL FIBRILLATION (H): Primary | ICD-10-CM

## 2019-10-14 PROCEDURE — 99213 OFFICE O/P EST LOW 20 MIN: CPT | Performed by: INTERNAL MEDICINE

## 2019-10-14 PROCEDURE — 93000 ELECTROCARDIOGRAM COMPLETE: CPT | Performed by: INTERNAL MEDICINE

## 2019-10-14 RX ORDER — POTASSIUM CHLORIDE 1500 MG/1
20 TABLET, EXTENDED RELEASE ORAL
Status: CANCELLED | OUTPATIENT
Start: 2019-10-14

## 2019-10-14 RX ORDER — LIDOCAINE 40 MG/G
CREAM TOPICAL
Status: CANCELLED | OUTPATIENT
Start: 2019-10-14

## 2019-10-14 RX ORDER — FLECAINIDE ACETATE 50 MG/1
75 TABLET ORAL 2 TIMES DAILY
Qty: 90 TABLET | Refills: 3 | Status: SHIPPED | OUTPATIENT
Start: 2019-10-14 | End: 2019-11-05

## 2019-10-14 RX ORDER — METOPROLOL TARTRATE 50 MG
50 TABLET ORAL 2 TIMES DAILY
Qty: 180 TABLET | Refills: 3 | Status: SHIPPED | OUTPATIENT
Start: 2019-10-14 | End: 2019-10-31

## 2019-10-14 RX ORDER — POTASSIUM CHLORIDE 1500 MG/1
40 TABLET, EXTENDED RELEASE ORAL
Status: CANCELLED | OUTPATIENT
Start: 2019-10-14

## 2019-10-14 NOTE — PROGRESS NOTES
CARDIOLOGY CLINIC FOLLOW UP    HPI: See dictated note    PAST MEDICAL HISTORY:  History reviewed. No pertinent past medical history.    CURRENT MEDICATIONS:  Current Outpatient Medications   Medication Sig Dispense Refill     flecainide (TAMBOCOR) 50 MG tablet Take 1.5 tablets (75 mg) by mouth 2 times daily 90 tablet 3     metoprolol tartrate (LOPRESSOR) 50 MG tablet Take 1 tablet (50 mg) by mouth 2 times daily 180 tablet 3     rivaroxaban ANTICOAGULANT (XARELTO) 20 MG TABS tablet Take 1 tablet (20 mg) by mouth daily (with dinner) 90 tablet 3     amoxicillin-clavulanate (AUGMENTIN) 875-125 MG tablet          PAST SURGICAL HISTORY:  Past Surgical History:   Procedure Laterality Date     ANESTHESIA CARDIOVERSION N/A 9/25/2019    Procedure: Anesthesia Coverage Transesophageal Echocardiogram, Cardioversion @0930;  Surgeon: [facility] ANESTHESIA PROVIDER;  Location: [facility] OR     VASECTOMY         ALLERGIES  No Known Allergies    FAMILY HX:  Family History   Problem Relation Age of Onset     Lung Cancer Mother      Cancer Father        SOCIAL HX:  Social History     Socioeconomic History     Marital status:      Spouse name: None     Number of children: None     Years of education: None     Highest education level: None   Occupational History     None   Social Needs     Financial resource strain: None     Food insecurity:     Worry: None     Inability: None     Transportation needs:     Medical: None     Non-medical: None   Tobacco Use     Smoking status: Former Smoker     Packs/day: 0.00     Smokeless tobacco: Never Used   Substance and Sexual Activity     Alcohol use: Yes     Drug use: Yes     Types: Marijuana     Sexual activity: Yes     Partners: Female   Lifestyle     Physical activity:     Days per week: None     Minutes per session: None     Stress: None   Relationships     Social connections:     Talks on phone: None     Gets together: None     Attends [employer] service: None     Active member of club or  organization: None     Attends meetings of clubs or organizations: None     Relationship status: None     Intimate partner violence:     Fear of current or ex partner: None     Emotionally abused: None     Physically abused: None     Forced sexual activity: None   Other Topics Concern     Parent/sibling w/ CABG, MI or angioplasty before 65F 55M? No   Social History Narrative     None       ROS:  Constitutional: No fever, chills, or sweats. No weight gain/loss.   ENT: No visual disturbance, ear ache, epistaxis, sore throat.   Allergies/Immunologic: Negative.   Respiratory: No cough, hemoptysis.   Cardiovascular: As per HPI.   GI: No nausea, vomiting, hematemesis, melena, or hematochezia.   : No urinary frequency, dysuria, or hematuria.   Integument: Negative.   Psychiatric: Negative.   Neuro: Negative.   Endocrinology: Negative.   Musculoskeletal: No myalgia.    VITAL SIGNS:  BP (!) 146/95   Pulse 76   Wt 112.5 kg (248 lb)   SpO2 99%   BMI 33.63 kg/m    Body mass index is 33.63 kg/m .  Wt Readings from Last 2 Encounters:   10/14/19 112.5 kg (248 lb)   09/26/19 111.7 kg (246 lb 3.2 oz)       PHYSICAL EXAM  Augusto Meeks is a 58 year old male in no acute distress.  HEENT: Unremarkable.  Neck: JVP normal.  Carotids +4/4 bilaterally without bruits.  Lungs: CTA.  Cor: irregular rhythm. Normal S1 and S2.  No murmur, rub, or gallop.  PMI in Lf 5th ICS.  Abd: Soft, nontender, nondistended.  NABS.  No pulsatile mass.  Extremities: No C/C/E.  Pulses +4/4 symmetric in upper and lower extremities.  Neuro: Grossly intact.    LABS    No results found for: WBC  No results found for: RBC  No results found for: HGB  No results found for: HCT  No components found for: MCT  No results found for: MCV  No results found for: MCH  No results found for: MCHC  No results found for: RDW  No results found for: PLT   Recent Labs   Lab Test 09/25/19  0747 02/28/18  1000   NA  --  138   POTASSIUM 4.4 4.4   CHLORIDE  --  103   CO2  --  25    ANIONGAP  --  10   GLC  --  110*   BUN  --  15   CR  --  0.89   CHALINO  --  9.0     Recent Labs   Lab Test 02/28/18  1000   CHOL 190   HDL 50   *   TRIG 133        ASSESSMENT AND PLAN:  See dictated note

## 2019-10-14 NOTE — LETTER
10/14/2019      RE: Augusto Meeks  [address] [facility] [address]     Dear Colleague,    Thank you for the opportunity to participate in the care of your patient, Augusto Meeks, at the [facility]. Please see a copy of my visit note below.    CARDIOLOGY CLINIC FOLLOW UP    HPI: See dictated note    PAST MEDICAL HISTORY:  History reviewed. No pertinent past medical history.    CURRENT MEDICATIONS:  Current Outpatient Medications   Medication Sig Dispense Refill     flecainide (TAMBOCOR) 50 MG tablet Take 1.5 tablets (75 mg) by mouth 2 times daily 90 tablet 3     metoprolol tartrate (LOPRESSOR) 50 MG tablet Take 1 tablet (50 mg) by mouth 2 times daily 180 tablet 3     rivaroxaban ANTICOAGULANT (XARELTO) 20 MG TABS tablet Take 1 tablet (20 mg) by mouth daily (with dinner) 90 tablet 3     amoxicillin-clavulanate (AUGMENTIN) 875-125 MG tablet          PAST SURGICAL HISTORY:  Past Surgical History:   Procedure Laterality Date     ANESTHESIA CARDIOVERSION N/A 9/25/2019    Procedure: Anesthesia Coverage Transesophageal Echocardiogram, Cardioversion @0930;  Surgeon: [facility] ANESTHESIA PROVIDER;  Location: [facility] OR     VASECTOMY         ALLERGIES  No Known Allergies    FAMILY HX:  Family History   Problem Relation Age of Onset     Lung Cancer Mother      Cancer Father        SOCIAL HX:  Social History     Socioeconomic History     Marital status:      Spouse name: None     Number of children: None     Years of education: None     Highest education level: None   Occupational History     None   Social Needs     Financial resource strain: None     Food insecurity:     Worry: None     Inability: None     Transportation needs:     Medical: None     Non-medical: None   Tobacco Use     Smoking status: Former Smoker     Packs/day: 0.00     Smokeless tobacco: Never Used   Substance and Sexual Activity     Alcohol use: Yes      Drug use: Yes     Types: Marijuana     Sexual activity: Yes     Partners: Female   Lifestyle     Physical activity:     Days per week: None     Minutes per session: None     Stress: None   Relationships     Social connections:     Talks on phone: None     Gets together: None     Attends [employer] service: None     Active member of club or organization: None     Attends meetings of clubs or organizations: None     Relationship status: None     Intimate partner violence:     Fear of current or ex partner: None     Emotionally abused: None     Physically abused: None     Forced sexual activity: None   Other Topics Concern     Parent/sibling w/ CABG, MI or angioplasty before 65F 55M? No   Social History Narrative     None       ROS:  Constitutional: No fever, chills, or sweats. No weight gain/loss.   ENT: No visual disturbance, ear ache, epistaxis, sore throat.   Allergies/Immunologic: Negative.   Respiratory: No cough, hemoptysis.   Cardiovascular: As per HPI.   GI: No nausea, vomiting, hematemesis, melena, or hematochezia.   : No urinary frequency, dysuria, or hematuria.   Integument: Negative.   Psychiatric: Negative.   Neuro: Negative.   Endocrinology: Negative.   Musculoskeletal: No myalgia.    VITAL SIGNS:  BP (!) 146/95   Pulse 76   Wt 112.5 kg (248 lb)   SpO2 99%   BMI 33.63 kg/m     Body mass index is 33.63 kg/m .  Wt Readings from Last 2 Encounters:   10/14/19 112.5 kg (248 lb)   09/26/19 111.7 kg (246 lb 3.2 oz)       PHYSICAL EXAM  Augusto Meeks is a 58 year old male in no acute distress.  HEENT: Unremarkable.  Neck: JVP normal.  Carotids +4/4 bilaterally without bruits.  Lungs: CTA.  Cor: irregular rhythm. Normal S1 and S2.  No murmur, rub, or gallop.  PMI in Lf 5th ICS.  Abd: Soft, nontender, nondistended.  NABS.  No pulsatile mass.  Extremities: No C/C/E.  Pulses +4/4 symmetric in upper and lower extremities.  Neuro: Grossly intact.    LABS    No results found for: WBC  No results found for:  RBC  No results found for: HGB  No results found for: HCT  No components found for: MCT  No results found for: MCV  No results found for: MCH  No results found for: MCHC  No results found for: RDW  No results found for: PLT   Recent Labs   Lab Test 09/25/19  0747 02/28/18  1000   NA  --  138   POTASSIUM 4.4 4.4   CHLORIDE  --  103   CO2  --  25   ANIONGAP  --  10   GLC  --  110*   BUN  --  15   CR  --  0.89   CHALINO  --  9.0     Recent Labs   Lab Test 02/28/18  1000   CHOL 190   HDL 50   *   TRIG 133        ASSESSMENT AND PLAN:  See dictated note    Visit Date:   10/14/2019      EP SPECIALTY CONSULTATION       PURPOSE OF VISIT:  The patient comes back for followup after cardioversion for atrial fibrillation.      HISTORY OF PRESENT ILLNESS:  Mr. Augusto Meeks is a 58-year-old gentleman with a medical history significant for hypertension.  The patient does not have any history of diabetes, coronary heart disease, previous strokes, TIAs or heart failure.      The patient's last visit with me was on 09/23/2019.  After that visit, the patient was sent for electrical cardioversion which was successful in converting the patient to normal sinus rhythm.  The patient is on Xarelto 20 mg once daily for stroke prophylaxis.      Immediately after the cardioversion, the patient felt symptomatically better.  He reports absence of fluttering sensation in her chest and feeling overall better.  When asked whether or not he is in normal rhythm or atrial fibrillation today, he replied that he was in normal rhythm.      The patient denies any symptoms of palpitations, exertional dyspnea, exertional angina, frequent lightheadedness, presyncope or syncope.  He does not exercise on a regular basis.      ASSESSMENT AND PLAN:  Persistent atrial fibrillation.      A 12-lead ECG today shows that the patient has gone back to atrial fibrillation after his cardioversion.  I discussed management options with the patient and his wife, which  include rate control versus rhythm control.  After an extensive discussion, patient is keen on continuing with rhythm control.  The patient does not have history of coronary artery disease and does not report symptoms of exertional angina or dyspnea. We will start the patient on flecainide 75 mg twice daily in about a week.  We will have the patient undergo an exercise treadmill stress test next week to ensure that there is no excessive QRS prolongation or exercise-induced ventricular tachycardia with.  A week or 2 after the exercise treadmill stress test, the patient will undergo an electrical cardioversion.  I have advised the patient not to miss any dose of Xarelto, so that we can perform the cardioversion without the need for DOT.      The patient will return to see Yady Barajas in the clinic in 1-2 weeks after the electrical cardioversion.  All questions and concerns were addressed, and patient and wife are happy with the plan.         GIORGI QUILES MD             D: 10/14/2019   T: 10/14/2019   MT: SPRING      Name:     OSIRIS MOORE   MRN:      [id number]        Account:      [id number]   :      1960           Visit Date:   10/14/2019      Document: [id number]

## 2019-10-14 NOTE — PATIENT INSTRUCTIONS
Thank you for coming to the [facility]; please note the following instructions:    1. Preventive Care:    Colorectal Cancer Screening: During our visit today, we discussed that it is recommended you receive colorectal cancer screening. Please call or make an appointment with your primary care provider to discuss this. You may also call the  [facility] scheduling line ([phone]) to set up a colonoscopy appointment.      2.  Xarelto prescription was sent to your local pharmacy.  Please use your savings card to get the discount.    3.  Start flecainide 75 mg twice daily.    4.  Treadmill stress test 1 week after starting flecainide.  10/23 at 2:00 pm    5.  1 week after the treadmill stress test, we will then repeat the cardioversion.  Silvia Brusnon, [place] , will be calling you to schedule.    6.  2 weeks after the cardioversion, follow up with Yady Barajas      If you have any questions regarding your visit please contact your care team:     Cardiology  Telephone Number   Beronica JORDAN., RN  Delisa TIRADO,RN  Lyric PARRISH, MAYRA SOLIS, FAUSTINO TRAN, N   [phone]   (select option 1)    *After hours: [phone]     For scheduling appts:     [phone] or    [phone] (select option 1)    *After hours: [phone]     For the Device Clinic (Pacemakers and ICD's)  RN's :  Arleth Scanlon   During business hours: [phone]    *After business hours:  [phone] (select option 4)      Normal test result notifications will be released via [employer] or mailed within 7 business days.  All other test results, will be communicated via telephone once reviewed by your cardiologist.    If you need a medication refill please contact your pharmacy.  Please allow 3 business days for your refill to be completed.    As always, thank you for trusting us with your health care needs!        Patient Education     Flecainide Acetate Oral tablet  What is this medicine?  FLECAINIDE  (NORBERT greene) is an antiarrhythmic drug. This medicine is used to prevent irregular heart rhythm. It can also slow down fast heartbeats called tachycardia.  This medicine may be used for other purposes; ask your health care provider or pharmacist if you have questions.  What should I tell my health care provider before I take this medicine?  They need to know if you have any of these conditions:    abnormal levels of potassium in the blood    heart disease including heart rhythm and heart rate problems    kidney or liver disease    recent heart attack    an unusual or allergic reaction to flecainide, local anesthetics, other medicines, foods, dyes, or preservatives    pregnant or trying to get pregnant    breast-feeding  How should I use this medicine?  Take this medicine by mouth with a glass of water. Follow the directions on the prescription label. You can take this medicine with or without food. Take your doses at regular intervals. Do not take your medicine more often than directed. Do not stop taking this medicine suddenly. This may cause serious, heart-related side effects. If your doctor wants you to stop the medicine, the dose may be slowly lowered over time to avoid any side effects.  Talk to your pediatrician regarding the use of this medicine in children. While this drug may be prescribed for children as young as 1 year of age for selected conditions, precautions do apply.  Overdosage: If you think you have taken too much of this medicine contact a poison control center or emergency room at once.  NOTE: This medicine is only for you. Do not share this medicine with others.  What if I miss a dose?  If you miss a dose, take it as soon as you can. If it is almost time for your next dose, take only that dose. Do not take double or extra doses.  What may interact with this medicine?  Do not take this medicine with any of the following medications:    amoxapine    arsenic trioxide    certain antibiotics like  clarithromycin, erythromycin, gatifloxacin, gemifloxacin, levofloxacin, moxifloxacin, sparfloxacin, or troleandomycin    certain antidepressants called tricyclic antidepressants like amitriptyline, imipramine, or nortriptyline    certain medicines to control heart rhythm like disopyramide, dofetilide, encainide, moricizine, procainamide, propafenone, and quinidine    cisapride    cyclobenzaprine    delavirdine    droperidol    haloperidol    hawthorn    imatinib    levomethadyl    maprotiline    medicines for malaria like chloroquine and halofantrine    pentamidine    phenothiazines like chlorpromazine, mesoridazine, prochlorperazine, thioridazine    pimozide    quinine    ranolazine    ritonavir    sertindole    ziprasidone  This medicine may also interact with the following medications:    cimetidine    medicines for angina or high blood pressure    medicines to control heart rhythm like amiodarone and digoxin  This list may not describe all possible interactions. Give your health care provider a list of all the medicines, herbs, non-prescription drugs, or dietary supplements you use. Also tell them if you smoke, drink alcohol, or use illegal drugs. Some items may interact with your medicine.  What should I watch for while using this medicine?  Visit your doctor or health care professional for regular checks on your progress. Because your condition and the use of this medicine carries some risk, it is a good idea to carry an identification card, necklace or bracelet with details of your condition, medications and doctor or health care professional.  Check your blood pressure and pulse rate regularly. Ask your health care professional what your blood pressure and pulse rate should be, and when you should contact him or her. Your doctor or health care professional also may schedule regular blood tests and electrocardiograms to check your progress.  You may get drowsy or dizzy. Do not drive, use machinery, or do  anything that needs mental alertness until you know how this medicine affects you. Do not stand or sit up quickly, especially if you are an older patient. This reduces the risk of dizzy or fainting spells. Alcohol can make you more dizzy, increase flushing and rapid heartbeats. Avoid alcoholic drinks.  What side effects may I notice from receiving this medicine?  Side effects that you should report to your doctor or health care professional as soon as possible:    chest pain, continued irregular heartbeats    difficulty breathing    swelling of the legs or feet    trembling, shaking    unusually weak or tired  Side effects that usually do not require medical attention (report to your doctor or health care professional if they continue or are bothersome):    blurred vision    constipation    headache    nausea, vomiting    stomach pain  This list may not describe all possible side effects. Call your doctor for medical advice about side effects. You may report side effects to FDA at 3-227-FDA-7448.  Where should I keep my medicine?  Keep out of the reach of children.  Store at room temperature between 15 and 30 degrees C (59 and 86 degrees F). Protect from light. Keep container tightly closed. Throw away any unused medicine after the expiration date.  NOTE:This sheet is a summary. It may not cover all possible information. If you have questions about this medicine, talk to your doctor, pharmacist, or health care provider. Copyright  2016 Gold Standard

## 2019-10-14 NOTE — NURSING NOTE
Chief Complaint   Patient presents with     Atrial Fib      Post Cardioversion follow up for A-fib.. - dr Per patient no heart symptoms at this time       Initial BP (!) 132/91 (BP Location: Left arm, Patient Position: Sitting, Cuff Size: Adult Large)   Pulse 76   Wt 112.5 kg (248 lb)   SpO2 99%   BMI 33.63 kg/m   Estimated body mass index is 33.63 kg/m  as calculated from the following:    Height as of 9/16/19: 1.829 m (6').    Weight as of this encounter: 112.5 kg (248 lb)..  BP completed using cuff size: large    Kacie Nieves L.P.N.

## 2019-10-14 NOTE — PROGRESS NOTES
Visit Date:   10/14/2019      EP SPECIALTY CONSULTATION       PURPOSE OF VISIT:  The patient comes back for followup after cardioversion for atrial fibrillation.      HISTORY OF PRESENT ILLNESS:  Mr. Augusto Meeks is a 58-year-old gentleman with a medical history significant for hypertension.  The patient does not have any history of diabetes, coronary heart disease, previous strokes, TIAs or heart failure.      The patient's last visit with me was on 09/23/2019.  After that visit, the patient was sent for electrical cardioversion which was successful in converting the patient to normal sinus rhythm.  The patient is on Xarelto 20 mg once daily for stroke prophylaxis.      Immediately after the cardioversion, the patient felt symptomatically better.  He reports absence of fluttering sensation in her chest and feeling overall better.  When asked whether or not he is in normal rhythm or atrial fibrillation today, he replied that he was in normal rhythm.      The patient denies any symptoms of palpitations, exertional dyspnea, exertional angina, frequent lightheadedness, presyncope or syncope.  He does not exercise on a regular basis.      ASSESSMENT AND PLAN:  Persistent atrial fibrillation.      A 12-lead ECG today shows that the patient has gone back to atrial fibrillation after his cardioversion.  I discussed management options with the patient and his wife, which include rate control versus rhythm control.  After an extensive discussion, patient is keen on continuing with rhythm control.  The patient does not have history of coronary artery disease and does not report symptoms of exertional angina or dyspnea. We will start the patient on flecainide 75 mg twice daily in about a week.  We will have the patient undergo an exercise treadmill stress test next week to ensure that there is no excessive QRS prolongation or exercise-induced ventricular tachycardia with.  A week or 2 after the exercise treadmill stress test,  the patient will undergo an electrical cardioversion.  I have advised the patient not to miss any dose of Xarelto, so that we can perform the cardioversion without the need for DOT.      The patient will return to see Yady Barajas in the clinic in 1-2 weeks after the electrical cardioversion.  All questions and concerns were addressed, and patient and wife are happy with the plan.         GIORGI QUILES MD             D: 10/14/2019   T: 10/14/2019   MT: [place]      Name:     OSIRIS MOORE   MRN:      [id number]        Account:      [id number]   :      1960           Visit Date:   10/14/2019      Document: [id number]

## 2019-10-23 ENCOUNTER — ANCILLARY PROCEDURE (OUTPATIENT)
Dept: CARDIOLOGY | Facility: CLINIC | Age: 59
End: 2019-10-23
Attending: INTERNAL MEDICINE
Payer: COMMERCIAL

## 2019-10-23 DIAGNOSIS — I48.0 PAROXYSMAL ATRIAL FIBRILLATION (H): ICD-10-CM

## 2019-10-23 NOTE — PROGRESS NOTES
Mr. Meeks arrived for Exercise Stress Test. He was found to be in Atrial Fibrillation. I spoke with the physician covering stress tests and it was decided that we cancel the stress test today. EKG can be found in [place]. Pt was wondering if he should continue Flecainide at this point. I will reach out to his EP to answer the patient's question and concerns.    Bk Prabhakar MS, ACSM-CEP

## 2019-10-28 ENCOUNTER — TELEPHONE (OUTPATIENT)
Dept: CARDIOLOGY | Facility: CLINIC | Age: 59
End: 2019-10-28

## 2019-10-28 DIAGNOSIS — I48.91 NEW ONSET ATRIAL FIBRILLATION (H): Primary | ICD-10-CM

## 2019-10-29 NOTE — TELEPHONE ENCOUNTER
----- Message from Vanessa Clement MD sent at 10/25/2019  5:42 AM CDT -----  Regarding: FW: Canceled Exercise Stress Test  Beronica,    Please go ahead and schedule the DOT guided cardioversion.  Please schedule the exercise treadmill test a few days after the cardioversion.    Thanks    ----- Message -----  From: Bk Prabhakar  Sent: 10/23/2019   2:28 PM CDT  To: Vanessa Clement MD  Subject: Canceled Exercise Stress Test                    Dr. Clement,    Mr. Meeks arrived for his stress test today. He was still in atrial fibrillation. I spoke with covering MD on proceeding with the test or not. We sided with canceling the stress test today. However, the patient had a question on whether he should continue Flecainide or not at this point. Please reach out to the patient at your earliest convenience.       Bk Prabhakar

## 2019-10-29 NOTE — TELEPHONE ENCOUNTER
DOT and cardioversion is scheduled for 10/30.  Patient is seeing Yady for follow up on 11/12.  He still needs to schedule the treadmill stress test for after the cardioversion.  Treadmill stress test ordered.    Beronica Montoya RN

## 2019-10-30 ENCOUNTER — ANESTHESIA (OUTPATIENT)
Dept: SURGERY | Facility: CLINIC | Age: 59
End: 2019-10-30
Payer: COMMERCIAL

## 2019-10-30 ENCOUNTER — HOSPITAL ENCOUNTER (OUTPATIENT)
Dept: CARDIOLOGY | Facility: CLINIC | Age: 59
End: 2019-10-30
Attending: INTERNAL MEDICINE | Admitting: INTERNAL MEDICINE
Payer: COMMERCIAL

## 2019-10-30 ENCOUNTER — APPOINTMENT (OUTPATIENT)
Dept: MEDSURG UNIT | Facility: CLINIC | Age: 59
End: 2019-10-30
Attending: INTERNAL MEDICINE
Payer: COMMERCIAL

## 2019-10-30 ENCOUNTER — ANESTHESIA EVENT (OUTPATIENT)
Dept: SURGERY | Facility: CLINIC | Age: 59
End: 2019-10-30
Payer: COMMERCIAL

## 2019-10-30 ENCOUNTER — HOSPITAL ENCOUNTER (OUTPATIENT)
Facility: CLINIC | Age: 59
Discharge: HOME | End: 2019-10-30
Attending: INTERNAL MEDICINE | Admitting: INTERNAL MEDICINE
Payer: COMMERCIAL

## 2019-10-30 ENCOUNTER — APPOINTMENT (OUTPATIENT)
Dept: LAB | Facility: CLINIC | Age: 59
End: 2019-10-30
Attending: INTERNAL MEDICINE
Payer: COMMERCIAL

## 2019-10-30 VITALS
TEMPERATURE: 116.6 F | DIASTOLIC BLOOD PRESSURE: 85 MMHG | SYSTOLIC BLOOD PRESSURE: 134 MMHG | OXYGEN SATURATION: 98 % | HEART RATE: 47 BPM | RESPIRATION RATE: 20 BRPM

## 2019-10-30 VITALS
DIASTOLIC BLOOD PRESSURE: 67 MMHG | HEART RATE: 49 BPM | RESPIRATION RATE: 16 BRPM | OXYGEN SATURATION: 96 % | SYSTOLIC BLOOD PRESSURE: 127 MMHG

## 2019-10-30 DIAGNOSIS — I48.0 PAROXYSMAL ATRIAL FIBRILLATION (H): ICD-10-CM

## 2019-10-30 LAB
MAGNESIUM SERPL-MCNC: 2.1 MG/DL (ref 1.6–2.3)
POTASSIUM SERPL-SCNC: 4.3 MMOL/L (ref 3.4–5.3)

## 2019-10-30 PROCEDURE — 92960 CARDIOVERSION ELECTRIC EXT: CPT

## 2019-10-30 PROCEDURE — 93010 ELECTROCARDIOGRAM REPORT: CPT | Mod: 76 | Performed by: INTERNAL MEDICINE

## 2019-10-30 PROCEDURE — 92960 CARDIOVERSION ELECTRIC EXT: CPT | Performed by: NURSE PRACTITIONER

## 2019-10-30 PROCEDURE — 40000166 ZZH STATISTIC PP CARE STAGE 1

## 2019-10-30 PROCEDURE — 37000008 ZZH ANESTHESIA TECHNICAL FEE, 1ST 30 MIN

## 2019-10-30 PROCEDURE — 40000065 ZZH STATISTIC EKG NON-CHARGEABLE

## 2019-10-30 PROCEDURE — 93005 ELECTROCARDIOGRAM TRACING: CPT

## 2019-10-30 PROCEDURE — 83735 ASSAY OF MAGNESIUM: CPT | Performed by: INTERNAL MEDICINE

## 2019-10-30 PROCEDURE — 84132 ASSAY OF SERUM POTASSIUM: CPT | Performed by: INTERNAL MEDICINE

## 2019-10-30 PROCEDURE — 25000125 ZZHC RX 250: Performed by: NURSE ANESTHETIST, CERTIFIED REGISTERED

## 2019-10-30 PROCEDURE — 36415 COLL VENOUS BLD VENIPUNCTURE: CPT | Performed by: INTERNAL MEDICINE

## 2019-10-30 RX ORDER — POTASSIUM CHLORIDE 1500 MG/1
40 TABLET, EXTENDED RELEASE ORAL
Status: DISCONTINUED | OUTPATIENT
Start: 2019-10-30 | End: 2019-10-31 | Stop reason: HOSPADM

## 2019-10-30 RX ORDER — POTASSIUM CHLORIDE 1500 MG/1
20 TABLET, EXTENDED RELEASE ORAL
Status: DISCONTINUED | OUTPATIENT
Start: 2019-10-30 | End: 2019-10-31 | Stop reason: HOSPADM

## 2019-10-30 RX ADMIN — METHOHEXITAL SODIUM 40 MG: 500 INJECTION, POWDER, LYOPHILIZED, FOR SOLUTION INTRAMUSCULAR; INTRAVENOUS; RECTAL at 10:23

## 2019-10-30 ASSESSMENT — ENCOUNTER SYMPTOMS: DYSRHYTHMIAS: 1

## 2019-10-30 NOTE — SEDATION DOCUMENTATION
Pt here for cardioversion. EKG shows Atrial fibrillation. Procedure was explained to the pt and the consent was signed. Discharge instructions were reviewed and a copy was given to the pt. Labs WNL and pt denies any missed doses of his anticoagulant. Pt was given Brevitol  40 mg IV for sedation per anesthesia. Pt was shocked with 200 Joules to a normal rhythm. Pt currently with HR 46 /92. Pt denies any pain or discomfort post procedure. Report called to . Pt transported to  on a stretcher. Pt will be discharged with a .

## 2019-10-30 NOTE — ANESTHESIA CARE TRANSFER NOTE
Patient: Augusto Meeks    Procedure(s):  ANESTHESIA, FOR CARDIOVERSION @1100    Diagnosis: Atrial fibrillation, unspecified type (H) [I48.91]  Diagnosis Additional Information: No value filed.    Anesthesia Type:   MAC     Note:  Airway :Nasal Cannula  Destination: ECHO Lab.  Comments: VSS. Ventilating well.      Vitals: (Last set prior to Anesthesia Care Transfer)    CRNA VITALS  10/30/2019 0956 - 10/30/2019 1044      10/30/2019             NIBP:  (!) 172/100    Ht Rate:  (!) 48                Electronically Signed By: JELANI Cramer CRNA  October 30, 2019  10:44 AM

## 2019-10-30 NOTE — DISCHARGE INSTRUCTIONS
Going Home after Sedation      For 24 hours:    An adult should stay with you.    Relax and take it easy.    DO NOT make any important legal decisions.    DO NOT drive or operate machines at home or at work.    Resume your regular diet and drink plenty of fluids.    If you have any redness/skin sorness where the patches were placed, you may use aloe vera gel as needed to help relieve local pain.     CALL THE PHYSICIAN IF:    You develop nausea or vomiting    You develop hives or a rash or any unexplained itching      You heart rate is low in normal rhythm so we will decrease your metoprolol to 25 mg (half tab) twice daily.  Continue your flecainide and Xarelto.    Schedule a stress test prior to your follow up in November.     ADDITIONAL INFORMATION:  Cardiovascular Clinic:   [address] [place] [address]  Your Care Team:  [facility]   Telephone Number     Iris Negrete RN  [phone]     For scheduling appts or procedures:    Silvia Molina   [phone]     As always, Thank you for trusting us with your health care needs!

## 2019-10-30 NOTE — ANESTHESIA POSTPROCEDURE EVALUATION
Anesthesia POST Procedure Evaluation    Patient: Augusto Meeks   MRN:     [id number] Gender:   male   Age:    58 year old :      1960        Preoperative Diagnosis: Atrial fibrillation, unspecified type (H) [I48.91]   Procedure(s):  ANESTHESIA, FOR CARDIOVERSION @1100   Postop Comments: No value filed.       Anesthesia Type:  Not documented  MAC    Reportable Event: NO     PAIN: Uncomplicated   Sign Out status: Comfortable, Well controlled pain     PONV: No PONV   Sign Out status:  No Nausea or Vomiting     Neuro/Psych: Uneventful perioperative course   Sign Out Status: Preoperative baseline; Age appropriate mentation     Airway/Resp.: Uneventful perioperative course   Sign Out Status: Non labored breathing, age appropriate RR; Resp. Status within EXPECTED Parameters     CV: Uneventful perioperative course   Sign Out status: Appropriate BP and perfusion indices; Appropriate HR/Rhythm     Disposition:   Sign-Out Location: echo.  Recovery Course: Uneventful  Follow-Up: Not required           Last Anesthesia Record Vitals:  CRNA VITALS  10/30/2019 0956 - 10/30/2019 1056      10/30/2019             NIBP:  (!) 172/100    Ht Rate:  (!) 48          Last PACU Vitals:  Vitals Value Taken Time   /77 10/30/2019 10:45 AM   Temp     Pulse 48 10/30/2019 10:45 AM   Resp 18 10/30/2019 10:45 AM   SpO2 98 % 10/30/2019 10:45 AM   Temp src     NIBP     Pulse     SpO2     Resp     Temp     Ht Rate     Temp 2           Electronically Signed By: Ciarra Goyal MD, 2019, 10:58 AM

## 2019-10-30 NOTE — IP AVS SNAPSHOT
[address] [address]                                    After Visit Summary   10/30/2019    Augusto Meeks    MRN: [id number]           After Visit Summary Signature Page    I have received my discharge instructions, and my questions have been answered. I have discussed any challenges I see with this plan with the nurse or doctor.    ..........................................................................................................................................  Patient/Patient Representative Signature      ..........................................................................................................................................  Patient Representative Print Name and Relationship to Patient    ..................................................               ................................................  Date                                   Time    ..........................................................................................................................................  Reviewed by Signature/Title    ...................................................              ..............................................  Date                                               Time          22EPIC Rev 08/18

## 2019-10-30 NOTE — PROGRESS NOTES
1045:  Received from Echo s/p cardioversion. Slightly reddened area noted posterior patch site; anterior site normal coloring. Denies pain. Requesting orals. 1100 : Patch sites WNL; no discoloration.  1115: Cardioversion discharge instructions was reviewed in Echo. Yady Guillen PA-C reviewed medication changes with patient(decrease Metoprolol from 50 mg BID to 25 mg BID). 1120:  Up to ambulate. Walked with a steady gait. 1125:  Discharged to home. Patient accompanied to vehicle by spouse.

## 2019-10-30 NOTE — ANESTHESIA PREPROCEDURE EVALUATION
Anesthesia Pre-Procedure Evaluation    Patient: Augusto Meeks   MRN:     [id number] Gender:   male   Age:    58 year old :      1960        Preoperative Diagnosis: Atrial fibrillation, unspecified type (H) [I48.91]   Procedure(s):  Anesthesia Coverage Transesophageal Echocardiogram, Cardioversion @0930     No past medical history on file.   Past Surgical History:   Procedure Laterality Date     ANESTHESIA CARDIOVERSION N/A 2019    Procedure: Anesthesia Coverage Transesophageal Echocardiogram, Cardioversion @0930;  Surgeon: GENERIC ANESTHESIA PROVIDER;  Location: [facility] OR     VASECTOMY            Anesthesia Evaluation     . Pt has not had prior anesthetic            ROS/MED HX    ENT/Pulmonary:     (+)GABRIEL risk factors snores loudly, obese, , . .    Neurologic:  - neg neurologic ROS     Cardiovascular:     (+) hypertension----. : . . . :. dysrhythmias a-fib, .       METS/Exercise Tolerance:     Hematologic:         Musculoskeletal:         GI/Hepatic:  - neg GI/hepatic ROS       Renal/Genitourinary:  - ROS Renal section negative       Endo:  - neg endo ROS       Psychiatric:         Infectious Disease:  - neg infectious disease ROS       Malignancy:         Other:                         PHYSICAL EXAM:   Mental Status/Neuro: A/A/O   Airway: Facies: Thick Neck  Mallampati: III  Mouth/Opening: Full  TM distance: > 6 cm  Neck ROM: Full   Respiratory: Auscultation: CTAB     Resp. Rate: Normal     Resp. Effort: Normal      CV: Rhythm: Regular  Rate: Age appropriate  Heart: Normal Sounds  Edema: None   Comments:      Dental: Normal Dentition                LABS:  CBC: No results found for: WBC, HGB, HCT, PLT  BMP:   Lab Results   Component Value Date     2018    POTASSIUM 4.3 10/30/2019    POTASSIUM 4.4 2019    CHLORIDE 103 2018    CO2 25 2018    BUN 15 2018    CR 0.89 2018     (H) 2018     COAGS:   Lab Results   Component Value Date    INR 1.39 (H)  09/25/2019     POC: No results found for: BGM, HCG, HCGS  OTHER:   Lab Results   Component Value Date    CHALINO 9.0 02/28/2018    MAG 2.1 10/30/2019    TSH 6.72 (H) 09/23/2019    T4 1.05 09/26/2019        Preop Vitals    BP Readings from Last 3 Encounters:   10/30/19 (!) 142/92   10/14/19 (!) 146/95   09/26/19 (!) 146/78    Pulse Readings from Last 3 Encounters:   10/30/19 (!) 46   10/14/19 76   09/26/19 64      Resp Readings from Last 3 Encounters:   10/30/19 16   09/25/19 18   09/25/19 17    SpO2 Readings from Last 3 Encounters:   10/30/19 100%   10/14/19 99%   09/26/19 99%      Temp Readings from Last 1 Encounters:   09/16/19 36.8  C (98.2  F) (Oral)    Ht Readings from Last 1 Encounters:   09/16/19 1.829 m (6')      Wt Readings from Last 1 Encounters:   10/14/19 112.5 kg (248 lb)    Estimated body mass index is 33.63 kg/m  as calculated from the following:    Height as of 9/16/19: 1.829 m (6').    Weight as of 10/14/19: 112.5 kg (248 lb).     LDA:  Peripheral IV 09/25/19 Left (Active)   Number of days: 35       Peripheral IV 10/30/19 Left (Active)   Site Assessment WDL 10/30/2019  9:41 AM   Line Status Infusing 10/30/2019  9:41 AM   Phlebitis Scale 0-->no symptoms 10/30/2019  9:41 AM   Number of days: 0        Assessment:   ASA SCORE: 2    H&P: History and physical reviewed and following examination; no interval change.   Smoking Status:  Non-Smoker/Unknown   NPO Status: NPO Appropriate     Plan:   Anes. Type:  MAC   Pre-Medication: None   Induction:  N/a   Airway: Native Airway   Access/Monitoring: PIV   Maintenance: N/a (methohexital)     Postop Plan:   Postop Pain: None  Postop Sedation/Airway: Not planned  Disposition: Outpatient     PONV Management:   Adult Risk Factors:, Non-Smoker     CONSENT: Direct conversation   Plan and risks discussed with: Patient   Blood Products: Consent Deferred (Minimal Blood Loss)                       Ciarra Goyal MD

## 2019-10-31 ENCOUNTER — NURSE TRIAGE (OUTPATIENT)
Dept: NURSING | Facility: CLINIC | Age: 59
End: 2019-10-31

## 2019-10-31 ENCOUNTER — MYC MEDICAL ADVICE (OUTPATIENT)
Dept: CARDIOLOGY | Facility: CLINIC | Age: 59
End: 2019-10-31

## 2019-10-31 DIAGNOSIS — I48.0 PAROXYSMAL ATRIAL FIBRILLATION (H): ICD-10-CM

## 2019-10-31 LAB
INTERPRETATION ECG - MUSE: NORMAL
INTERPRETATION ECG - MUSE: NORMAL

## 2019-10-31 RX ORDER — METOPROLOL TARTRATE 50 MG
25 TABLET ORAL 2 TIMES DAILY
Start: 2019-10-31 | End: 2019-11-05

## 2019-10-31 NOTE — TELEPHONE ENCOUNTER
Patient had cardioversion yesterday 10/30/2019 for afib.Dr Vanessa Clement    PMH: significant for hypertension.  The patient does not have any history of diabetes, coronary heart disease, previous strokes, TIAs or heart failure.     He was transferred today to writer/ red [facility] due to sx of bradycardia in high forties per Apple watch as he was scheduling stress test.  Meds:  -Metoprolol > directed to cut in half yesterday he is now taking 25 mg twice daily as of last nights dose and this AM  -Tambocor 75 mg twice daily and Xarelto 20 mg daily as per medication list  - he does not check BP at home.    Prior to procedure his heart rate was 70-80s.  -He wears an Apple watch which keeps beeping at him for bradycardia below 50 BPM.   He can do EKG strip on watch, however  it won't check afib if below heart rate of 50.    He continued to have what he felt were his   Afib symptoms if felt them yesterday post procedure, but when checked rhythm on watch he was in sinus rhythm/ rate 60's.    Heart rate initially this AM was 60s/ sinus. Since then, he is at work and checking frequently but he can't get a read on EKG.  High forties since initial check when awoke  He feels ok.  He states he is generally stoic, and feels  little more tired and winded which is how he felt after initial cardioversion( he has had procedure twice- last time 9/19/2019).                           Additional Information    Negative: Passed out (i.e., fainted, collapsed and was not responding)    Negative: Shock suspected (e.g., cold/pale/clammy skin, too weak to stand, low BP, rapid pulse)    Negative: Difficult to awaken or acting confused (e.g., disoriented, slurred speech)    Negative: Visible sweat on face or sweat dripping down face    Negative: Unable to walk, or can only walk with assistance (e.g., requires support)    Negative: Received SHOCK from implantable cardiac defibrillator and has persisting symptoms (i.e., palpitations,  lightheadedness)    Negative: Sounds like a life-threatening emergency to the triager    Negative: Chest pain    Protocols used: HEART RATE AND HEARTBEAT [id number]

## 2019-10-31 NOTE — TELEPHONE ENCOUNTER
Spoke with pt. Stated he is not having any symptoms. Denies lightheadedness, dizziness. Unsure of blood pressure.   pts apple watch shows SR.   Offered appt with Yady next Tuesday (11/5)  Pt stated he is feeling fine, but if something changes he will call and schd to see yady duncan NP next week.     Pt agreed with plan, verbalized understanding

## 2019-10-31 NOTE — TELEPHONE ENCOUNTER
Noted,    Call to pt; rec'd voicemail, requested call back, or asked pt to respond to my chart message

## 2019-11-04 ENCOUNTER — TELEPHONE (OUTPATIENT)
Dept: CARDIOLOGY | Facility: CLINIC | Age: 59
End: 2019-11-04

## 2019-11-04 NOTE — TELEPHONE ENCOUNTER
Health Call Center    Phone Message    May a detailed message be left on voicemail: yes    Reason for Call: Other: Augusto calling to let Delisa Negrete RN know that he would like to move his appointment up from 11/06/19 to 11/05/19, since he has not improved. Augusto went into the ED this weekend due to having difficulty breathing. Please give Augusto a call back at your earliest convenience to reschedule.     Action Taken: Other: [facility]

## 2019-11-05 ENCOUNTER — OFFICE VISIT (OUTPATIENT)
Dept: CARDIOLOGY | Facility: CLINIC | Age: 59
End: 2019-11-05
Payer: COMMERCIAL

## 2019-11-05 ENCOUNTER — TRANSFERRED RECORDS (OUTPATIENT)
Dept: HEALTH INFORMATION MANAGEMENT | Facility: CLINIC | Age: 59
End: 2019-11-05

## 2019-11-05 VITALS
HEIGHT: 71 IN | DIASTOLIC BLOOD PRESSURE: 85 MMHG | SYSTOLIC BLOOD PRESSURE: 137 MMHG | OXYGEN SATURATION: 97 % | WEIGHT: 241 LBS | HEART RATE: 96 BPM | BODY MASS INDEX: 33.74 KG/M2

## 2019-11-05 DIAGNOSIS — R06.83 SNORING: Primary | ICD-10-CM

## 2019-11-05 DIAGNOSIS — I48.0 PAROXYSMAL ATRIAL FIBRILLATION (H): ICD-10-CM

## 2019-11-05 DIAGNOSIS — I10 BENIGN ESSENTIAL HYPERTENSION: ICD-10-CM

## 2019-11-05 PROCEDURE — 99213 OFFICE O/P EST LOW 20 MIN: CPT | Performed by: NURSE PRACTITIONER

## 2019-11-05 PROCEDURE — 93000 ELECTROCARDIOGRAM COMPLETE: CPT | Performed by: NURSE PRACTITIONER

## 2019-11-05 RX ORDER — LISINOPRIL 20 MG/1
20 TABLET ORAL DAILY
Qty: 90 TABLET | Refills: 3 | Status: SHIPPED | OUTPATIENT
Start: 2019-11-05 | End: 2019-12-03

## 2019-11-05 RX ORDER — METOPROLOL SUCCINATE 25 MG/1
25 TABLET, EXTENDED RELEASE ORAL DAILY
Qty: 90 TABLET | Refills: 3 | Status: SHIPPED | OUTPATIENT
Start: 2019-11-05 | End: 2019-12-10

## 2019-11-05 RX ORDER — FLECAINIDE ACETATE 100 MG/1
100 TABLET ORAL 2 TIMES DAILY
Qty: 180 TABLET | Refills: 3 | COMMUNITY
Start: 2019-11-05 | End: 2019-11-12

## 2019-11-05 ASSESSMENT — PAIN SCALES - GENERAL: PAINLEVEL: NO PAIN (0)

## 2019-11-05 ASSESSMENT — MIFFLIN-ST. JEOR: SCORE: 1940.3

## 2019-11-05 NOTE — PATIENT INSTRUCTIONS
1. Continue Xeralto for stroke prevention    2. Decrease to metoprolol XL 25 mg once daily. A Rx has been sent to your pharmacy.     3. Increase to flecainide 100 mg twice daily.     4. Start lisinopril 20 mg once daily for high blood pressure    5. Exercise stress test next week    6. Sleep Referral     7. Follow up in one month or follow up sooner if needed for problems or symptoms.  Patient Education     Lisinopril tablets  Brand Names: Prinivil, Zestril  What is this medicine?  LISINOPRIL (lyse IN oh pril) is an ACE inhibitor. This medicine is used to treat high blood pressure and heart failure. It is also used to protect the heart immediately after a heart attack.  How should I use this medicine?  Take this medicine by mouth with a glass of water. Follow the directions on your prescription label. You may take this medicine with or without food. If it upsets your stomach, take it with food. Take your medicine at regular intervals. Do not take it more often than directed. Do not stop taking except on your doctor's advice.  Talk to your pediatrician regarding the use of this medicine in children. Special care may be needed. While this drug may be prescribed for children as young as 6 years of age for selected conditions, precautions do apply.  What side effects may I notice from receiving this medicine?  Side effects that you should report to your doctor or health care professional as soon as possible:    allergic reactions like skin rash, itching or hives, swelling of the hands, feet, face, lips, throat, or tongue    breathing problems    signs and symptoms of kidney injury like trouble passing urine or change in the amount of urine    signs and symptoms of increased potassium like muscle weakness; chest pain; or fast, irregular heartbeat    signs and symptoms of liver injury like dark yellow or brown urine; general ill feeling or flu-like symptoms; light-colored stools; loss of appetite; nausea; right upper  belly pain; unusually weak or tired; yellowing of the eyes or skin    signs and symptoms of low blood pressure like dizziness; feeling faint or lightheaded, falls; unusually weak or tired    stomach pain with or without nausea and vomiting  Side effects that usually do not require medical attention (report to your doctor or health care professional if they continue or are bothersome):    changes in taste    cough    dizziness    fever    headache    sensitivity to light  What may interact with this medicine?  Do not take this medicine with any of the following medications:    hymenoptera venom    sacubitril; valsartan  This medicines may also interact with the following medications:    aliskiren    angiotensin receptor blockers, like losartan or valsartan    certain medicines for diabetes    diuretics    everolimus    gold compounds    lithium    NSAIDs, medicines for pain and inflammation, like ibuprofen or naproxen    potassium salts or supplements    salt substitutes    sirolimus    temsirolimus  What if I miss a dose?  If you miss a dose, take it as soon as you can. If it is almost time for your next dose, take only that dose. Do not take double or extra doses.  Where should I keep my medicine?  Keep out of the reach of children.  Store at room temperature between 15 and 30 degrees C (59 and 86 degrees F). Protect from moisture. Keep container tightly closed. Throw away any unused medicine after the expiration date.  What should I tell my health care provider before I take this medicine?  They need to know if you have any of these conditions:    diabetes    heart or blood vessel disease    kidney disease    low blood pressure    previous swelling of the tongue, face, or lips with difficulty breathing, difficulty swallowing, hoarseness, or tightening of the throat    an unusual or allergic reaction to lisinopril, other ACE inhibitors, insect venom, foods, dyes, or preservatives    pregnant or trying to get  pregnant    breast-feeding  What should I watch for while using this medicine?  Visit your doctor or health care professional for regular check ups. Check your blood pressure as directed. Ask your doctor what your blood pressure should be, and when you should contact him or her.  Do not treat yourself for coughs, colds, or pain while you are using this medicine without asking your doctor or health care professional for advice. Some ingredients may increase your blood pressure.  Women should inform their doctor if they wish to become pregnant or think they might be pregnant. There is a potential for serious side effects to an unborn child. Talk to your health care professional or pharmacist for more information.  Check with your doctor or health care professional if you get an attack of severe diarrhea, nausea and vomiting, or if you sweat a lot. The loss of too much body fluid can make it dangerous for you to take this medicine.  You may get drowsy or dizzy. Do not drive, use machinery, or do anything that needs mental alertness until you know how this drug affects you. Do not stand or sit up quickly, especially if you are an older patient. This reduces the risk of dizzy or fainting spells. Alcohol can make you more drowsy and dizzy. Avoid alcoholic drinks.  Avoid salt substitutes unless you are told otherwise by your doctor or health care professional.  NOTE:This sheet is a summary. It may not cover all possible information. If you have questions about this medicine, talk to your doctor, pharmacist, or health care provider. Copyright  2019 [email or web address]

## 2019-11-05 NOTE — NURSING NOTE
"Chief Complaint   Patient presents with     RECHECK     2 week follow up for S/P. Patient reports SOB, palpitations, and lightheadedness.        Initial BP (!) 159/92 (BP Location: Left arm, Patient Position: Sitting, Cuff Size: Adult Regular)   Pulse 94   Ht 1.803 m (5' 11\")   Wt 109.3 kg (241 lb)   SpO2 97%   BMI 33.61 kg/m   Estimated body mass index is 33.61 kg/m  as calculated from the following:    Height as of this encounter: 1.803 m (5' 11\").    Weight as of this encounter: 109.3 kg (241 lb)..  BP completed using cuff size: Adult Regular    PAULETTE Velazco  "

## 2019-11-05 NOTE — PROGRESS NOTES
Heart Care - Clinical Cardiac Electrophysiology       HPI:   Mr. Augusto Meeks is a 58-year-old gentleman who presents for follow up s/p DCCV.  He has a medical history significant for hypertension and PAF. In 9/2019, the patient underwent electrical cardioversion which was successful in converting the patient to normal sinus rhythm.  The patient is on Xarelto 20 mg once daily for stroke prophylaxis. Immediately after the cardioversion, the patient felt symptomatically better. He subsequently converted back into AF, so was started on flecainide and underwent a successful DCCV 2 weeks later on 10/30/2019.  On 11/2/2019 he present to the [facility] ED with dyspnea and workup positive for SB @ 52 bpm, pulmonary congestion of CXR. He was give a dose of lasix and Duoneb with improvement in symptoms and discharged home.     Reviewed current interval:  -- Presenting EKG personally reviewed tracings: Af, Vent rate 81, NV interval NA ms, QRS duration 84 ms, QTc 457 ms    Current interval history:  Patient states that on 11/3, the day after his ED visit, he felt like he went back into AF and his symptoms of dyspnea, fatigue, and lightheadedness improved. Apple watch showed SB less than 50 bpm when he did not feel well and when in AF 60-80s bpm with improved symptoms.  States that he still some fatigue and palpitations but dyspnea is resolved. States that his wife says that he snores. States that he has been working on weight loss and has lost 8 lbs in the past couple of weeks. Denies chest pain or pressure, syncope, angina, orthopnea, PND, abdominal pain, abdominal edema, pedal edema, or claudication.  Denies easy bruising or bleeding, hematuria, hematochezia, and epistaxis. Denies signs/symptoms of stroke such as visual disturbance, difficulty speaking, facial drooping, confusion, problems with gait, or any new numbness or weakness.     Current Outpatient Medications   Medication Sig Dispense Refill     flecainide (TAMBOCOR)  "50 MG tablet Take 1.5 tablets (75 mg) by mouth 2 times daily 90 tablet 3     metoprolol tartrate (LOPRESSOR) 50 MG tablet Take 0.5 tablets (25 mg) by mouth 2 times daily       rivaroxaban ANTICOAGULANT (XARELTO) 20 MG TABS tablet Take 1 tablet (20 mg) by mouth daily (with dinner) 90 tablet 3     amoxicillin-clavulanate (AUGMENTIN) 875-125 MG tablet          History reviewed. No pertinent past medical history.    Past Surgical History:   Procedure Laterality Date     ANESTHESIA CARDIOVERSION N/A 9/25/2019    Procedure: Anesthesia Coverage Transesophageal Echocardiogram, Cardioversion @0930;  Surgeon: GENERIC ANESTHESIA PROVIDER;  Location: [facility] OR     ANESTHESIA CARDIOVERSION N/A 10/30/2019    Procedure: ANESTHESIA, FOR CARDIOVERSION @1100;  Surgeon: GENERIC ANESTHESIA PROVIDER;  Location: [facility] OR     VASECTOMY         Family History   Problem Relation Age of Onset     Lung Cancer Mother      Cancer Father        Social History     Tobacco Use     Smoking status: Former Smoker     Packs/day: 0.00     Smokeless tobacco: Never Used   Substance Use Topics     Alcohol use: Yes       No Known Allergies      ROS:   A complete review of systems was performed and is negative except as noted in the HPI.    Physical Examination:  Vitals: BP (!) 159/92 (BP Location: Left arm, Patient Position: Sitting, Cuff Size: Adult Regular)   Pulse 94   Ht 1.803 m (5' 11\")   Wt 109.3 kg (241 lb)   SpO2 97%   BMI 33.61 kg/m    BMI= Body mass index is 33.61 kg/m .    GENERAL APPEARANCE: healthy, alert, and no acute distress  HEENT: no icterus, no xanthelasmas, normal pupil size and reaction, no cyanosis.  NECK: no asymmetry, no cervical bruits, no JVD   RESPIRATORY: lungs clear to auscultation - no rales, rhonchi or wheezes, no use of accessory muscles, no retractions, respirations are unlabored, normal respiratory rate  CARDIOVASCULAR: irregular rhythm and no murmur, click or rub.  GI: no abdominal bruits, soft, non-tender  EXTREMITIES: no " clubbing  NEURO: alert and oriented to person/place/time, normal speech, gait and affect  VASC: Radial and posterior tibialis pulses +2 and symmetric bilaterally. No cyanosis. No edema.   SKIN: no ecchymoses, no rashes.     Assessment and recommendations:      # Paroxysmal atrial fibrillation: Discussed in detail with the patient management/treatment options for AF includin. Stroke Prophylaxis:  CHADSVASC=HTN+  1, corresponding to a 1.3% annual stroke / systemic emolism event rate. Has been taking rivaoxaban without bleeding problems. Continue rivaroxaban.  2. Rate Control: Has had symptomatic bradycardia. Decrease to metoprolol XL 25 mg once daily.   3. Rhythm Control: States that he would like to avoid another DCCV. He is not interested in ablation at this time.  Increase to flecainide 100 mg twice daily.  Exercise stress test next week.  4. Sleep referral done  5. Continue to work on weight loss. Congratulated patient on job well done so far.     # Hypertension:  1. Start lisinopril 20 mg once daily    FOLLOW UP: Follow up in one month or follow up sooner if needed for problems or symptoms.    Patient expresses understanding and agreement with the plan.    I appreciate the chance to help with Augusto Meeks's care. Please let me know if you have any questions or concerns.    KRIS TineoC

## 2019-11-08 ENCOUNTER — MYC REFILL (OUTPATIENT)
Dept: FAMILY MEDICINE | Facility: CLINIC | Age: 59
End: 2019-11-08

## 2019-11-08 DIAGNOSIS — I48.0 PAROXYSMAL ATRIAL FIBRILLATION (H): ICD-10-CM

## 2019-11-08 NOTE — TELEPHONE ENCOUNTER
"Requested Prescriptions   Pending Prescriptions Disp Refills     flecainide (TAMBOCOR) 100 MG tablet 180 tablet 3     Sig: Take 1 tablet (100 mg) by mouth 2 times daily       Anti Arrhythmic Agents Protocol Failed - 11/8/2019 11:16 AM        Failed - Lipid Panel on file in past year     Recent Labs   Lab Test 02/28/18  1000   CHOL 190   TRIG 133   HDL 50   *   NHDL 140*               Failed - CBC on file in past year     No lab results found.              Failed - ALT on file in past year     No lab results found.          Failed - Serum Creatinine on file in past year     Recent Labs   Lab Test 02/28/18  1000   CR 0.89             Failed - Medication needs approval from authorizing provider     Please forward this request to the authorizing provider for approval.           Failed - Serum Sodium on file in past year     Recent Labs   Lab Test 02/28/18  1000                 Passed - Serum Potassium on file in past year     Recent Labs   Lab Test 10/30/19  0920   POTASSIUM 4.3             Passed - Patient is 18 years of age or older        Passed - Recent (6 mo) or future (30 days) visit with authorizing provider's specialty     Patient had office visit in the last 6 months or has a visit in the next 30 days with authorizing provider.  See \"Patient Info\" tab in inbasket, or \"Choose Columns\" in Meds & Orders section of the refill encounter.              "

## 2019-11-09 ENCOUNTER — MYC REFILL (OUTPATIENT)
Dept: FAMILY MEDICINE | Facility: CLINIC | Age: 59
End: 2019-11-09

## 2019-11-09 DIAGNOSIS — I48.0 PAROXYSMAL ATRIAL FIBRILLATION (H): ICD-10-CM

## 2019-11-09 RX ORDER — FLECAINIDE ACETATE 100 MG/1
100 TABLET ORAL 2 TIMES DAILY
Qty: 180 TABLET | Refills: 3 | Status: CANCELLED | OUTPATIENT
Start: 2019-11-09

## 2019-11-10 RX ORDER — FLECAINIDE ACETATE 100 MG/1
100 TABLET ORAL 2 TIMES DAILY
Qty: 180 TABLET | Refills: 3 | OUTPATIENT
Start: 2019-11-10

## 2019-11-11 NOTE — TELEPHONE ENCOUNTER
"Flecainide refill request  On med list per cardiology but was never sent to pharmacy; listed as \"historical\"  Needs sent to his pharmacy by cardiology    Routing to cardiology per Dr. Singh Valencia request for the flecainide refill request.  Per 19 OV note:   # Paroxysmal atrial fibrillation: Discussed in detail with the patient management/treatment options for AF includin. Stroke Prophylaxis:  CHADSVASC=HTN+  1, corresponding to a 1.3% annual stroke / systemic emolism event rate. Has been taking rivaoxaban without bleeding problems. Continue rivaroxaban.  2. Rate Control: Has had symptomatic bradycardia. Decrease to metoprolol XL 25 mg once daily.   3. Rhythm Control: States that he would like to avoid another DCCV. He is not interested in ablation at this time.  Increase to flecainide 100 mg twice daily.   Xochilt Munoz RN  "

## 2019-11-11 NOTE — TELEPHONE ENCOUNTER
Routing to cardiology per Dr. Singh Valencia request for the flecainide refill request.  Per 19 OV note:   # Paroxysmal atrial fibrillation: Discussed in detail with the patient management/treatment options for AF includin. Stroke Prophylaxis:  CHADSVASC=HTN+  1, corresponding to a 1.3% annual stroke / systemic emolism event rate. Has been taking rivaoxaban without bleeding problems. Continue rivaroxaban.  2. Rate Control: Has had symptomatic bradycardia. Decrease to metoprolol XL 25 mg once daily.   3. Rhythm Control: States that he would like to avoid another DCCV. He is not interested in ablation at this time.  Increase to flecainide 100 mg twice daily.   Xochilt Munoz RN

## 2019-11-12 DIAGNOSIS — I48.0 PAROXYSMAL ATRIAL FIBRILLATION (H): ICD-10-CM

## 2019-11-12 RX ORDER — FLECAINIDE ACETATE 100 MG/1
100 TABLET ORAL 2 TIMES DAILY
Qty: 180 TABLET | Refills: 0 | Status: SHIPPED | OUTPATIENT
Start: 2019-11-12 | End: 2019-11-21

## 2019-11-12 NOTE — TELEPHONE ENCOUNTER
Signed Prescriptions:                        Disp   Refills    flecainide (TAMBOCOR) 100 MG tablet        180 ta*0        Sig: Take 1 tablet (100 mg) by mouth 2 times daily  Authorizing Provider: IVA ZHOU  Ordering User: BERONICA MONTOYA    Rx filled per refill protocol.  Beronica Montoya RN

## 2019-11-13 ENCOUNTER — ANCILLARY PROCEDURE (OUTPATIENT)
Dept: CARDIOLOGY | Facility: CLINIC | Age: 59
End: 2019-11-13
Attending: INTERNAL MEDICINE
Payer: COMMERCIAL

## 2019-11-13 DIAGNOSIS — I48.91 NEW ONSET ATRIAL FIBRILLATION (H): ICD-10-CM

## 2019-11-13 PROCEDURE — 93018 CV STRESS TEST I&R ONLY: CPT | Performed by: INTERNAL MEDICINE

## 2019-11-13 PROCEDURE — 93017 CV STRESS TEST TRACING ONLY: CPT | Performed by: INTERNAL MEDICINE

## 2019-11-13 PROCEDURE — 93016 CV STRESS TEST SUPVJ ONLY: CPT | Performed by: INTERNAL MEDICINE

## 2019-11-14 NOTE — PROGRESS NOTES
SUBJECTIVE:   CC: Augusto Meeks is an 58 year old male who presents for preventative health visit.     Healthy Habits:     Getting at least 3 servings of Calcium per day:  NO    Bi-annual eye exam:  NO    Dental care twice a year:  NO    Sleep apnea or symptoms of sleep apnea:  Excessive snoring    Diet:  Carbohydrate counting    Frequency of exercise:  None    Taking medications regularly:  Yes    Medication side effects:  Not applicable    PHQ-2 Total Score: 0    Additional concerns today:  No      Augusto is a 58 y.o male who presents for Wellness exam.  He was diagnosed with Atrial Fibrillation 2 months ago and has undergone Cardioversion and DCCV with neither being successful as he has converted back to Afib.  His Afib and HTN is managed by Cardiology.  He is anticoagulated with Xarelto and doing well with this.  He does report snoring and is needing a referral for sleep study.   He has been monitoring his diet and has lost 10 lbs.        Today's PHQ-2 Score:   PHQ-2 ( 1999 [employer]) 11/15/2019   Q1: Little interest or pleasure in doing things 0   Q2: Feeling down, depressed or hopeless 0   PHQ-2 Score 0   Q1: Little interest or pleasure in doing things Not at all   Q2: Feeling down, depressed or hopeless Not at all   PHQ-2 Score 0       Abuse: Current or Past(Physical, Sexual or Emotional)- No  Do you feel safe in your environment? Yes        Social History     Tobacco Use     Smoking status: Former Smoker     Packs/day: 0.00     Smokeless tobacco: Never Used   Substance Use Topics     Alcohol use: Yes       Alcohol Use 11/15/2019   Prescreen: >3 drinks/day or >7 drinks/week? Yes   Prescreen: >3 drinks/day or >7 drinks/week? -   AUDIT SCORE  6       Last PSA: No results found for: PSA    Reviewed orders with patient. Reviewed health maintenance and updated orders accordingly - Yes  BP Readings from Last 3 Encounters:   11/15/19 114/77   11/05/19 137/85   10/30/19 134/85    Wt Readings from Last 3 Encounters:    11/15/19 108.1 kg (238 lb 6.4 oz)   11/05/19 109.3 kg (241 lb)   10/14/19 112.5 kg (248 lb)                  Patient Active Problem List   Diagnosis     Advanced directives, counseling/discussion     Hyperlipidemia     New onset atrial fibrillation (H)     Tobacco use disorder     Benign essential hypertension     Past Surgical History:   Procedure Laterality Date     ANESTHESIA CARDIOVERSION N/A 9/25/2019    Procedure: Anesthesia Coverage Transesophageal Echocardiogram, Cardioversion @0930;  Surgeon: GENERIC ANESTHESIA PROVIDER;  Location: [facility] OR     ANESTHESIA CARDIOVERSION N/A 10/30/2019    Procedure: ANESTHESIA, FOR CARDIOVERSION @1100;  Surgeon: GENERIC ANESTHESIA PROVIDER;  Location: [facility] OR     CARDIOVERSION  10/30/2019    Patient reported he had a cardioverson on 09/25/2019.     VASECTOMY         Social History     Tobacco Use     Smoking status: Former Smoker     Packs/day: 0.00     Smokeless tobacco: Never Used   Substance Use Topics     Alcohol use: Yes     Family History   Problem Relation Age of Onset     Lung Cancer Mother      Cancer Father          Current Outpatient Medications   Medication Sig Dispense Refill     flecainide (TAMBOCOR) 100 MG tablet Take 1 tablet (100 mg) by mouth 2 times daily 180 tablet 0     lisinopril (PRINIVIL/ZESTRIL) 20 MG tablet Take 1 tablet (20 mg) by mouth daily 90 tablet 3     metoprolol succinate ER (TOPROL-XL) 25 MG 24 hr tablet Take 1 tablet (25 mg) by mouth daily 90 tablet 3     rivaroxaban ANTICOAGULANT (XARELTO) 20 MG TABS tablet Take 1 tablet (20 mg) by mouth daily (with dinner) 90 tablet 3     No Known Allergies    Reviewed and updated as needed this visit by clinical staff  Tobacco  Allergies  Meds  Med Hx  Surg Hx  Fam Hx  Soc Hx        Reviewed and updated as needed this visit by Provider            Review of Systems   Constitutional: Negative for chills and fever.   HENT: Negative for congestion, ear pain, hearing loss and sore throat.    Eyes: Negative  "for pain and visual disturbance.   Respiratory: Positive for cough. Negative for shortness of breath.    Cardiovascular: Negative for chest pain, palpitations and peripheral edema.   Gastrointestinal: Negative for abdominal pain, constipation, diarrhea, heartburn, hematochezia and nausea.   Genitourinary: Positive for impotence. Negative for dysuria, frequency, genital sores, hematuria and urgency.   Musculoskeletal: Negative for arthralgias, joint swelling and myalgias.   Skin: Negative for rash.   Neurological: Negative for dizziness, weakness, headaches and paresthesias.   Psychiatric/Behavioral: Negative for mood changes. The patient is not nervous/anxious.          OBJECTIVE:   /77   Pulse 73   Temp 97.2  F (36.2  C) (Oral)   Ht 1.803 m (5' 11\")   Wt 108.1 kg (238 lb 6.4 oz)   BMI 33.25 kg/m      Physical Exam  Vitals signs reviewed.   Constitutional:       Appearance: He is well-developed.   HENT:      Head: Normocephalic.      Right Ear: Tympanic membrane normal.      Left Ear: Tympanic membrane normal.      Nose: Nose normal.      Mouth/Throat:      Mouth: Mucous membranes are moist.      Pharynx: Oropharynx is clear. Uvula midline.   Eyes:      General: Lids are normal.      Conjunctiva/sclera: Conjunctivae normal.      Pupils: Pupils are equal, round, and reactive to light.   Neck:      Musculoskeletal: Normal range of motion and neck supple.   Cardiovascular:      Rate and Rhythm: Normal rate and regular rhythm.      Heart sounds: Normal heart sounds.   Pulmonary:      Effort: Pulmonary effort is normal.      Breath sounds: Normal breath sounds.   Abdominal:      General: Bowel sounds are normal. There is no distension.      Palpations: Abdomen is soft.      Tenderness: There is no abdominal tenderness.   Musculoskeletal: Normal range of motion.   Lymphadenopathy:      Cervical: No cervical adenopathy.   Skin:     General: Skin is warm and dry.   Neurological:      Mental Status: He is alert " "and oriented to person, place, and time.   Psychiatric:         Mood and Affect: Mood normal.         Speech: Speech normal.         Behavior: Behavior normal.         Thought Content: Thought content normal.             ASSESSMENT/PLAN:   1. Encounter for preventive health examination  - repeat annually  - Comprehensive metabolic panel  - he declines Influenza and Td today.     2. Snoring  - SLEEP EVALUATION & MANAGEMENT REFERRAL - ADULT -[facility] [place] - [phone]; Future    3. Screen for colon cancer  - Cologuard paperwork completed.     4. Screening for HIV (human immunodeficiency virus)  - HIV Screening    5. Screening for prostate cancer  - Prostate spec antigen screen    6. Screening for diabetes mellitus  - Hemoglobin A1c    7. Screening for hyperlipidemia  - Lipid panel reflex to direct LDL Fasting    COUNSELING:   Reviewed preventive health counseling, as reflected in patient instructions       Regular exercise       Healthy diet/nutrition       Vision screening       HIV screeninx in teen years, 1x in adult years, and at intervals if high risk       Colon cancer screening       Prostate cancer screening    Estimated body mass index is 33.25 kg/m  as calculated from the following:    Height as of this encounter: 1.803 m (5' 11\").    Weight as of this encounter: 108.1 kg (238 lb 6.4 oz).     Weight management plan: Discussed healthy diet and exercise guidelines     reports that he has quit smoking. He smoked 0.00 packs per day. He has never used smokeless tobacco.      Counseling Resources:  ATP IV Guidelines  Pooled Cohorts Equation Calculator  FRAX Risk Assessment  ICSI Preventive Guidelines  Dietary Guidelines for Americans,   USDA's MyPlate  ASA Prophylaxis  Lung CA Screening    Louann Ramirez NP  [facility]  "

## 2019-11-15 ENCOUNTER — OFFICE VISIT (OUTPATIENT)
Dept: FAMILY MEDICINE | Facility: CLINIC | Age: 59
End: 2019-11-15
Payer: COMMERCIAL

## 2019-11-15 VITALS
SYSTOLIC BLOOD PRESSURE: 114 MMHG | HEIGHT: 71 IN | BODY MASS INDEX: 33.38 KG/M2 | WEIGHT: 238.4 LBS | HEART RATE: 73 BPM | DIASTOLIC BLOOD PRESSURE: 77 MMHG | TEMPERATURE: 97.2 F

## 2019-11-15 DIAGNOSIS — Z11.4 SCREENING FOR HIV (HUMAN IMMUNODEFICIENCY VIRUS): ICD-10-CM

## 2019-11-15 DIAGNOSIS — Z00.00 ENCOUNTER FOR PREVENTIVE HEALTH EXAMINATION: Primary | ICD-10-CM

## 2019-11-15 DIAGNOSIS — Z13.1 SCREENING FOR DIABETES MELLITUS: ICD-10-CM

## 2019-11-15 DIAGNOSIS — Z13.220 SCREENING FOR HYPERLIPIDEMIA: ICD-10-CM

## 2019-11-15 DIAGNOSIS — R06.83 SNORING: ICD-10-CM

## 2019-11-15 DIAGNOSIS — R79.89 ABNORMAL TSH: ICD-10-CM

## 2019-11-15 DIAGNOSIS — Z12.11 SCREEN FOR COLON CANCER: ICD-10-CM

## 2019-11-15 DIAGNOSIS — Z12.5 SCREENING FOR PROSTATE CANCER: ICD-10-CM

## 2019-11-15 LAB
ALBUMIN SERPL-MCNC: 4.2 G/DL (ref 3.4–5)
ALP SERPL-CCNC: 71 U/L (ref 40–150)
ALT SERPL W P-5'-P-CCNC: 48 U/L (ref 0–70)
ANION GAP SERPL CALCULATED.3IONS-SCNC: 6 MMOL/L (ref 3–14)
AST SERPL W P-5'-P-CCNC: 20 U/L (ref 0–45)
BILIRUB SERPL-MCNC: 0.7 MG/DL (ref 0.2–1.3)
BUN SERPL-MCNC: 16 MG/DL (ref 7–30)
CALCIUM SERPL-MCNC: 9.2 MG/DL (ref 8.5–10.1)
CHLORIDE SERPL-SCNC: 104 MMOL/L (ref 94–109)
CHOLEST SERPL-MCNC: 180 MG/DL
CO2 SERPL-SCNC: 28 MMOL/L (ref 20–32)
CREAT SERPL-MCNC: 0.89 MG/DL (ref 0.66–1.25)
GFR SERPL CREATININE-BSD FRML MDRD: >90 ML/MIN/{1.73_M2}
GLUCOSE SERPL-MCNC: 111 MG/DL (ref 70–99)
HBA1C MFR BLD: 4.8 % (ref 0–5.6)
HDLC SERPL-MCNC: 44 MG/DL
HIV 1+2 AB+HIV1 P24 AG SERPL QL IA: NONREACTIVE
LDLC SERPL CALC-MCNC: 115 MG/DL
NONHDLC SERPL-MCNC: 136 MG/DL
POTASSIUM SERPL-SCNC: 4.4 MMOL/L (ref 3.4–5.3)
PROT SERPL-MCNC: 7.7 G/DL (ref 6.8–8.8)
PSA SERPL-ACNC: 2.07 UG/L (ref 0–4)
SODIUM SERPL-SCNC: 138 MMOL/L (ref 133–144)
T3FREE SERPL-MCNC: 2.9 PG/ML (ref 2.3–4.2)
T4 FREE SERPL-MCNC: 1.06 NG/DL (ref 0.76–1.46)
TRIGL SERPL-MCNC: 105 MG/DL
TSH SERPL DL<=0.005 MIU/L-ACNC: 2.25 MU/L (ref 0.4–4)

## 2019-11-15 PROCEDURE — 99396 PREV VISIT EST AGE 40-64: CPT | Performed by: NURSE PRACTITIONER

## 2019-11-15 PROCEDURE — 80061 LIPID PANEL: CPT | Performed by: NURSE PRACTITIONER

## 2019-11-15 PROCEDURE — 87389 HIV-1 AG W/HIV-1&-2 AB AG IA: CPT | Performed by: NURSE PRACTITIONER

## 2019-11-15 PROCEDURE — 84443 ASSAY THYROID STIM HORMONE: CPT | Performed by: INTERNAL MEDICINE

## 2019-11-15 PROCEDURE — 84439 ASSAY OF FREE THYROXINE: CPT | Performed by: INTERNAL MEDICINE

## 2019-11-15 PROCEDURE — G0103 PSA SCREENING: HCPCS | Performed by: NURSE PRACTITIONER

## 2019-11-15 PROCEDURE — 83036 HEMOGLOBIN GLYCOSYLATED A1C: CPT | Performed by: NURSE PRACTITIONER

## 2019-11-15 PROCEDURE — 80053 COMPREHEN METABOLIC PANEL: CPT | Performed by: NURSE PRACTITIONER

## 2019-11-15 PROCEDURE — 36415 COLL VENOUS BLD VENIPUNCTURE: CPT | Performed by: NURSE PRACTITIONER

## 2019-11-15 PROCEDURE — 84481 FREE ASSAY (FT-3): CPT | Performed by: INTERNAL MEDICINE

## 2019-11-15 ASSESSMENT — ENCOUNTER SYMPTOMS
PARESTHESIAS: 0
MYALGIAS: 0
FEVER: 0
HEMATURIA: 0
COUGH: 1
HEARTBURN: 0
ARTHRALGIAS: 0
CHILLS: 0
DIARRHEA: 0
ABDOMINAL PAIN: 0
NAUSEA: 0
HEMATOCHEZIA: 0
FREQUENCY: 0
NERVOUS/ANXIOUS: 0
JOINT SWELLING: 0
SHORTNESS OF BREATH: 0
DIZZINESS: 0
SORE THROAT: 0
WEAKNESS: 0
PALPITATIONS: 0
HEADACHES: 0
DYSURIA: 0
EYE PAIN: 0
CONSTIPATION: 0

## 2019-11-15 ASSESSMENT — MIFFLIN-ST. JEOR: SCORE: 1928.51

## 2019-11-18 DIAGNOSIS — R79.89 ABNORMAL TSH: Primary | ICD-10-CM

## 2019-11-20 ENCOUNTER — TRANSFERRED RECORDS (OUTPATIENT)
Dept: HEALTH INFORMATION MANAGEMENT | Facility: CLINIC | Age: 59
End: 2019-11-20

## 2019-11-20 LAB — COLOGUARD-ABSTRACT: POSITIVE

## 2019-11-21 DIAGNOSIS — I48.0 PAROXYSMAL ATRIAL FIBRILLATION (H): ICD-10-CM

## 2019-11-21 RX ORDER — FLECAINIDE ACETATE 100 MG/1
100 TABLET ORAL 2 TIMES DAILY
Qty: 180 TABLET | Refills: 1 | Status: SHIPPED | OUTPATIENT
Start: 2019-11-21 | End: 2020-01-22

## 2019-11-21 NOTE — PROGRESS NOTES
Result note reviewed with patient.  Patient verbalized understanding.  Patient requesting flecainide Rx sent to different preferred pharmacy.  Also requesting order for sleep eval to be faxed over the [facility]. Order faxed to [phone] Beronica Montoya RN      Per Dr. Clement, Dr. Clement reviewed ekg's and patient to continue flecainide at the same dose and follow up with tamera Barajas NP.  This was communicated with patient via phone.  Patient verbalized understanding.    Beronica Montoya RN  Cardiology Care Coordinator  [facility]  [phone] option 1

## 2019-11-27 ENCOUNTER — TELEPHONE (OUTPATIENT)
Dept: FAMILY MEDICINE | Facility: CLINIC | Age: 59
End: 2019-11-27

## 2019-11-27 DIAGNOSIS — Z12.11 SPECIAL SCREENING FOR MALIGNANT NEOPLASMS, COLON: Primary | ICD-10-CM

## 2019-11-27 NOTE — TELEPHONE ENCOUNTER
Your patient has a positive Cologuard test result.  I placed the report in the top bin in your office for review.  Please put the report in my TC basket so I can STAT scan it into the patients chart.  Thank you.  Kenyetta Hutton,

## 2019-12-02 NOTE — PROGRESS NOTES
Heart Care - Clinical Cardiac Electrophysiology       HPI: Mr. Augusto Meeks is a 58-year-old gentleman who presents for follow up persistent AF.  He has a medical history significant for hypertension and AF. In 9/2019, the patient underwent electrical cardioversion which was successful in converting the patient to normal sinus rhythm.  The patient is on Xarelto 20 mg once daily for stroke prophylaxis. Immediately after the cardioversion, the patient felt symptomatically better. He subsequently converted back into AF, so was started on flecainide and underwent a successful DCCV 2 weeeks later on 10/30/2019.  On 11/2/2019 he present to the [facility] ED with dyspnea and workup positive for SB @ 52 bpm, pulmonary congestion of CXR. He was give a dose of lasix and Duoneb with improvement in symptoms and discharged home. At his EP clinic appointment 11/5/2019 he was found to be back in AF and did not wish a repeat DCCV or to consider ablation at that time so flecainide was increased.     Reviewed current interval:  -- 11/13/2019 exercise stress test was a normal stress EKG with no evidence of stress-induced ischemia. No signs flecainide toxicity.  -- Presenting EKG personally reviewed tracings: AF, Vent rate 72, NV interval NA ms, QRS duration 96 ms, QTc 448 ms.    Current Interval history:   Patient states that he has been feeling well.  He states that he would like to attempt another cardioversion now that he has been on the higher dose of flecainide but is still in AF.  States that his BPs have now been controlled but that he has had an annoying dry cough since starting the lisinopril. States that he has not missed any doses of medication this past month. Denies chest pain or pressure, syncope, dyspnea at rest or with exertion, orthopnea, PND, abdominal edema, pedal edema, or claudication.  Denies easy bruising or bleeding, hematuria, hematochezia, and epistaxis. Denies signs/symptoms of stroke such as visual  disturbance, difficulty speaking, facial drooping, confusion, problems with gait, or any new numbness or weakness.    Current Outpatient Medications   Medication Sig Dispense Refill     flecainide (TAMBOCOR) 100 MG tablet Take 1 tablet (100 mg) by mouth 2 times daily 180 tablet 1     lisinopril (PRINIVIL/ZESTRIL) 20 MG tablet Take 1 tablet (20 mg) by mouth daily 90 tablet 3     metoprolol succinate ER (TOPROL-XL) 25 MG 24 hr tablet Take 1 tablet (25 mg) by mouth daily 90 tablet 3     rivaroxaban ANTICOAGULANT (XARELTO) 20 MG TABS tablet Take 1 tablet (20 mg) by mouth daily (with dinner) 90 tablet 3       Past Medical History:   Diagnosis Date     A-fib (H) 2019       Past Surgical History:   Procedure Laterality Date     ANESTHESIA CARDIOVERSION N/A 9/25/2019    Procedure: Anesthesia Coverage Transesophageal Echocardiogram, Cardioversion @0930;  Surgeon: GENERIC ANESTHESIA PROVIDER;  Location: [facility] OR     ANESTHESIA CARDIOVERSION N/A 10/30/2019    Procedure: ANESTHESIA, FOR CARDIOVERSION @1100;  Surgeon: GENERIC ANESTHESIA PROVIDER;  Location: [facility] OR     CARDIOVERSION  10/30/2019    Patient reported he had a cardioverson on 09/25/2019.     VASECTOMY         Family History   Problem Relation Age of Onset     Lung Cancer Mother      Cancer Father        Social History     Tobacco Use     Smoking status: Former Smoker     Packs/day: 0.00     Smokeless tobacco: Never Used   Substance Use Topics     Alcohol use: Yes       No Known Allergies      ROS:   A complete review of systems was performed and is negative except as noted in the HPI.     Physical Examination:  Vitals: /75 (BP Location: Left arm, Patient Position: Chair, Cuff Size: Adult Large)   Pulse 73   Wt 107 kg (236 lb)   SpO2 100%   BMI 32.92 kg/m    BMI= Body mass index is 32.92 kg/m .    GENERAL APPEARANCE: healthy, alert, and no acute distress  HEENT: no icterus, no xanthelasmas, normal pupil size and reaction, no cyanosis.  NECK: no asymmetry, no  cervical bruits, no JVD   RESPIRATORY: lungs clear to auscultation - no rales, rhonchi or wheezes, no use of accessory muscles, no retractions, respirations are unlabored, normal respiratory rate  CARDIOVASCULAR: irregular rhythm and no murmur, click or rub  GI:  no abdominal bruits, soft, non-tender  EXTREMITIES: no clubbing  NEURO: alert and oriented to person/place/time, normal speech, gait and affect  VASC: Radial and posterior tibialis pulses +2 and symmetric bilaterally. No cyanosis or edema.   SKIN: no ecchymoses, no rashes    Assessment and recommendations:    # Paroxysmal atrial fibrillation: Discussed in detail with the patient management/treatment options for AF includin. Stroke Prophylaxis:  CHADSVASC=HTN+  1, corresponding to a 1.3% annual stroke / systemic emolism event rate. Has been taking rivaoxaban without bleeding problems. Continue rivaroxaban 20 mg once daily with the evening meal. He has not missed any doses of the rivaroxaban for at least the past month.  2. Rate Control: Continue metoprolol XL 25 mg once daily.   3. Rhythm Control: He would like to attempt another DCCV.Continue flecainide 100 mg twice daily. DIRECT CURRENT CARDIOVERSION: Patient counseled on DCCV. The goal of the procedure is to disrupt the abnormal electrical circuit(s) in the heart and thereby to reset the heart to normal rhythm.  Normal rhythm can be restored over 90% of the time but abnormal rhythm may recur over time.  Instructed not to not eat or drink for 8 hours before the procedure and that home medication can be taken with a sip of water. Complications are uncommon and include abnormal heart rhythm, minor skin burns, reaction to sedation medication, and stroke or pulmonary embolism due to blood clots that may be in the heart.  Abnormal rhythm has been present for more than 48 hours. Patient has been taking rivaroxaban for anticoagulation without interruption for at least four weeks and has not missed doses.  A  DOT to check for blood clots prior to DCCV is NOT necessary. Follow up with Dr. Vanessa Clement in 4 weeks.     4. Sleep referral done 11/2019, patient has not made appointment yet.  5. Continue to work on weight loss. Congratulated patient on job well done so far.      # Hypertension:  1. Metoprolol XL 25 mg once daily.  2. Has had lisinopril cough. Stop lisinopril. Start losartan 50 mg once daily.      FOLLOW UP: Follow up one month after DCCV with Dr. Vanessa Clement or follow up sooner if needed for problems or symptoms.    Patient expresses understanding and agreement with the plan.    I appreciate the chance to help with Augusto Meeks's care. Please let me know if you have any questions or concerns.    Yady PALOMARES, CNP

## 2019-12-03 ENCOUNTER — TRANSFERRED RECORDS (OUTPATIENT)
Dept: HEALTH INFORMATION MANAGEMENT | Facility: CLINIC | Age: 59
End: 2019-12-03

## 2019-12-03 ENCOUNTER — OFFICE VISIT (OUTPATIENT)
Dept: CARDIOLOGY | Facility: CLINIC | Age: 59
End: 2019-12-03
Attending: NURSE PRACTITIONER
Payer: COMMERCIAL

## 2019-12-03 VITALS
BODY MASS INDEX: 32.92 KG/M2 | WEIGHT: 236 LBS | OXYGEN SATURATION: 100 % | SYSTOLIC BLOOD PRESSURE: 109 MMHG | DIASTOLIC BLOOD PRESSURE: 75 MMHG | HEART RATE: 73 BPM

## 2019-12-03 DIAGNOSIS — I10 BENIGN ESSENTIAL HYPERTENSION: ICD-10-CM

## 2019-12-03 DIAGNOSIS — I48.0 PAROXYSMAL ATRIAL FIBRILLATION (H): Primary | ICD-10-CM

## 2019-12-03 PROCEDURE — 99214 OFFICE O/P EST MOD 30 MIN: CPT | Performed by: NURSE PRACTITIONER

## 2019-12-03 PROCEDURE — 93000 ELECTROCARDIOGRAM COMPLETE: CPT | Performed by: NURSE PRACTITIONER

## 2019-12-03 RX ORDER — LOSARTAN POTASSIUM 50 MG/1
50 TABLET ORAL DAILY
Qty: 90 TABLET | Refills: 3 | Status: SHIPPED | OUTPATIENT
Start: 2019-12-03 | End: 2020-02-11 | Stop reason: SINTOL

## 2019-12-03 RX ORDER — LIDOCAINE 40 MG/G
CREAM TOPICAL
Status: CANCELLED | OUTPATIENT
Start: 2019-12-03

## 2019-12-03 RX ORDER — POTASSIUM CHLORIDE 1500 MG/1
40 TABLET, EXTENDED RELEASE ORAL
Status: CANCELLED | OUTPATIENT
Start: 2019-12-03

## 2019-12-03 RX ORDER — POTASSIUM CHLORIDE 1500 MG/1
20 TABLET, EXTENDED RELEASE ORAL
Status: CANCELLED | OUTPATIENT
Start: 2019-12-03

## 2019-12-03 NOTE — NURSING NOTE
"Chief Complaint   Patient presents with     RECHECK     OV with DONATO Lyons for 1 month follow up for medication changes, test results. Pt reports no cardiac symptoms.       Initial /75 (BP Location: Left arm, Patient Position: Chair, Cuff Size: Adult Large)   Pulse 73   Wt 107 kg (236 lb)   SpO2 100%   BMI 32.92 kg/m   Estimated body mass index is 32.92 kg/m  as calculated from the following:    Height as of 11/15/19: 1.803 m (5' 11\").    Weight as of this encounter: 107 kg (236 lb)..  BP completed using cuff size: bravo Hough MA  "

## 2019-12-03 NOTE — LETTER
12/3/2019      RE: Augusto Meeks  [address] [facility] [address]       Dear Colleague,    Thank you for the opportunity to participate in the care of your patient, Augusto Meeks, at the [facility] HEART AT [employer] [facility]. Please see a copy of my visit note below.    Heart Care - Clinical Cardiac Electrophysiology       HPI: Mr. Augusto Meeks is a 58-year-old gentleman who presents for follow up persistent AF.  He has a medical history significant for hypertension and AF. In 9/2019, the patient underwent electrical cardioversion which was successful in converting the patient to normal sinus rhythm.  The patient is on Xarelto 20 mg once daily for stroke prophylaxis. Immediately after the cardioversion, the patient felt symptomatically better. He subsequently converted back into AF, so was started on flecainide and underwent a successful DCCV  2 weeeks later on 10/30/2019.  On 11/2/2019 he present to the [facility] ED with dyspnea and workup positive for SB @ 52 bpm, pulmonary congestion of CXR. He was give a dose of lasix and Duoneb with improvement in symptoms and discharged home.  At his EP clinic appointment 11/5/2019 he was found to be back in AF and did not wish a repeat DCCV or to consider ablation at that time so flecainide was increased.     Reviewed current interval:  -- 11/13/2019 exercise stress test was a normal stress EKG with no evidence of stress-induced ischemia. No signs flecainide toxicity.  -- Presenting EKG personally reviewed tracings: AF, Vent rate 72, AR interval NA ms, QRS duration 96 ms, QTc 448 ms.    Current Interval history:   Patient states that he has been feeling well.  He states that he would like to attempt another cardioversion now that he has been on the higher dose of flecainide but is still in AF.  States that his BPs have now been controlled but that he has had an annoying dry cough since starting  the lisinopril. States that he has not missed any doses of medication this past month. Denies chest pain or pressure, syncope, dyspnea at rest or with exertion, orthopnea, PND, abdominal edema, pedal edema, or claudication.  Denies easy bruising or bleeding, hematuria, hematochezia, and epistaxis. Denies signs/symptoms of stroke such as visual disturbance, difficulty speaking, facial drooping, confusion, problems with gait, or any new numbness or weakness.    Current Outpatient Medications   Medication Sig Dispense Refill     flecainide (TAMBOCOR) 100 MG tablet Take 1 tablet (100 mg) by mouth 2 times daily 180 tablet 1     lisinopril (PRINIVIL/ZESTRIL) 20 MG tablet Take 1 tablet (20 mg) by mouth daily 90 tablet 3     metoprolol succinate ER (TOPROL-XL) 25 MG 24 hr tablet Take 1 tablet (25 mg) by mouth daily 90 tablet 3     rivaroxaban ANTICOAGULANT (XARELTO) 20 MG TABS tablet Take 1 tablet (20 mg) by mouth daily (with dinner) 90 tablet 3       Past Medical History:   Diagnosis Date     A-fib (H) 2019       Past Surgical History:   Procedure Laterality Date     ANESTHESIA CARDIOVERSION N/A 9/25/2019    Procedure: Anesthesia Coverage Transesophageal Echocardiogram, Cardioversion @0930;  Surgeon: GENERIC ANESTHESIA PROVIDER;  Location: [facility] OR     ANESTHESIA CARDIOVERSION N/A 10/30/2019    Procedure: ANESTHESIA, FOR CARDIOVERSION @1100;  Surgeon: GENERIC ANESTHESIA PROVIDER;  Location: [facility] OR     CARDIOVERSION  10/30/2019    Patient reported he had a cardioverson on 09/25/2019.     VASECTOMY         Family History   Problem Relation Age of Onset     Lung Cancer Mother      Cancer Father        Social History     Tobacco Use     Smoking status: Former Smoker     Packs/day: 0.00     Smokeless tobacco: Never Used   Substance Use Topics     Alcohol use: Yes       No Known Allergies      ROS:   A complete review of systems was performed and is negative except as noted in the HPI.     Physical Examination:  Vitals: /75  (BP Location: Left arm, Patient Position: Chair, Cuff Size: Adult Large)   Pulse 73   Wt 107 kg (236 lb)   SpO2 100%   BMI 32.92 kg/m     BMI= Body mass index is 32.92 kg/m .    GENERAL APPEARANCE: healthy, alert, and no acute distress  HEENT: no icterus, no xanthelasmas, normal pupil size and reaction, no cyanosis.  NECK: no asymmetry, no cervical bruits, no JVD   RESPIRATORY: lungs clear to auscultation - no rales, rhonchi or wheezes, no use of accessory muscles, no retractions, respirations are unlabored, normal respiratory rate  CARDIOVASCULAR: irregular rhythm and no murmur, click or rub  GI:  no abdominal bruits, soft, non-tender  EXTREMITIES: no clubbing  NEURO: alert and oriented to person/place/time, normal speech, gait and affect  VASC: Radial and posterior tibialis pulses +2 and symmetric bilaterally. No cyanosis or edema.   SKIN: no ecchymoses, no rashes    Assessment and recommendations:    # Paroxysmal atrial fibrillation: Discussed in detail with the patient management/treatment options for AF includin. Stroke Prophylaxis:  CHADSVASC=HTN+  1, corresponding to a 1.3% annual stroke / systemic emolism event rate. Has been taking rivaoxaban without bleeding problems. Continue rivaroxaban 20 mg once daily with the evening meal. He has not missed any doses of the rivaroxaban for at least the past month.  2. Rate Control:  Continue metoprolol XL 25 mg once daily.   3. Rhythm Control:  He would like to attempt another DCCV.Continue flecainide 100 mg twice daily. DIRECT CURRENT CARDIOVERSION: Patient counseled on DCCV. The goal of the procedure is to disrupt the abnormal electrical circuit(s) in the heart and thereby to reset the heart to normal rhythm.  Normal rhythm can be restored over 90% of the time but abnormal rhythm may recur over time.  Instructed not to not eat or drink for 8 hours before the procedure and that home medication can be taken with a sip of water. Complications are uncommon and  include abnormal heart rhythm, minor skin burns, reaction to sedation medication, and stroke or pulmonary embolism due to blood clots that may be in the heart.  Abnormal rhythm has been present for more than 48 hours. Patient has been taking rivaroxaban for anticoagulation without interruption for at least four weeks and has not missed doses.  A DOT to check for blood clots prior to DCCV is NOT necessary. Follow up with Dr. Vanessa Clement in 4 weeks.     4. Sleep referral done  11/2019, patient has not made appointment yet.  5. Continue to work on weight loss. Congratulated patient on job well done so far.      # Hypertension:  1.  Metoprolol XL 25 mg once daily.  2. Has had lisinopril cough. Stop lisinopril. Start losartan 50 mg once daily.      FOLLOW UP: Follow up one month after DCCV with Dr. Vanessa Clement or follow up sooner if needed for problems or symptoms.    Patient expresses understanding and agreement with the plan.    I appreciate the chance to help with Augusto Meeks's care. Please let me know if you have any questions or concerns.    Yady PALOMARES, CNP

## 2019-12-03 NOTE — PATIENT INSTRUCTIONS
Thank you for coming to the [facility] Heart @ [facility]; please note the following instructions:    1. Stop lisinopril.   2. Start losartan 50 mg once daily.     3. You are scheduled for a Cardioversion at the [facility] ([address], [facility] [address], [phone]).       Follow these instructions:    1. Report to the GOLD waiting room in the [facility] hospital on: __________    2. Do not eat or drink 6 hours prior to arrival.    3. The morning of your procedure you may take your scheduled medications with a SIP of water. If you take diabetic medications or a diuretic, you may hold these.     4. You will receive medication that makes you sleepy; you will need a  and someone to stay with you for 24 hours following this procedure.    You should not make any legal decisions for 24 hours following discharge.       If you have further questions, please utilize [facility] to contact us.   If your question concerns the above instructions, contact:  Beronica Monotya RN  Electrophysiology Nurse Coordinator.  [phone]    If your question concerns the schedule/appointment times, contact:  RALEIGH Paul Procedure   [phone]            If you have any questions regarding your visit please contact your care team:     Cardiology  Telephone Number   Beronica HINKLE, ISREAL TIRADO, ISREAL PARRISH, MAYRA SOLIS, MAYRA TRAN, LPN   [phone] (option 1)   For scheduling appts:     [phone] (select option 1)       For the Device Clinic (Pacemakers and ICD's)  RN's :  Arleth Scanlon   During business hours: [phone]    *After business hours:  [phone] (select option 4)      Normal test result notifications will be released via [facility] or mailed within 7 business days.  All other test results, will be communicated via telephone once reviewed by your cardiologist.    If you need a medication refill please contact your pharmacy.  Please allow 3 business days for  your refill to be completed.    As always, thank you for trusting us with your health care needs!

## 2019-12-04 NOTE — TELEPHONE ENCOUNTER
Patient called back and below message was given to him. He understands this and will call Kiara Sutton to schedule the colonoscopy.    Danay JOHNN, RN

## 2019-12-04 NOTE — TELEPHONE ENCOUNTER
Please call Chandu and let him know the Cologuard came back positive, meaning there was some type of abnormality detected in the stool.  He needs to have a colonoscopy for further investigation of this.  I have placed a referral to the [place] location [phone].  Please give him the phone number as well to call and get the c-scope scheduled.  If he prefers a different location/office that is ok.      Louann Ramirez, NP

## 2019-12-04 NOTE — TELEPHONE ENCOUNTER
Left message on voicemail for patient to call back.     Direct number given: [phone].  Danay Wolf BSN, RN

## 2019-12-05 ENCOUNTER — APPOINTMENT (OUTPATIENT)
Dept: LAB | Facility: CLINIC | Age: 59
End: 2019-12-05
Attending: RADIOLOGY
Payer: COMMERCIAL

## 2019-12-05 ENCOUNTER — ANESTHESIA (OUTPATIENT)
Dept: SURGERY | Facility: CLINIC | Age: 59
End: 2019-12-05
Payer: COMMERCIAL

## 2019-12-05 ENCOUNTER — HOSPITAL ENCOUNTER (OUTPATIENT)
Facility: CLINIC | Age: 59
Discharge: HOME | End: 2019-12-05
Attending: RADIOLOGY | Admitting: RADIOLOGY
Payer: COMMERCIAL

## 2019-12-05 ENCOUNTER — APPOINTMENT (OUTPATIENT)
Dept: MEDSURG UNIT | Facility: CLINIC | Age: 59
End: 2019-12-05
Attending: RADIOLOGY
Payer: COMMERCIAL

## 2019-12-05 ENCOUNTER — ANESTHESIA EVENT (OUTPATIENT)
Dept: SURGERY | Facility: CLINIC | Age: 59
End: 2019-12-05
Payer: COMMERCIAL

## 2019-12-05 ENCOUNTER — HOSPITAL ENCOUNTER (OUTPATIENT)
Dept: CARDIOLOGY | Facility: CLINIC | Age: 59
End: 2019-12-05
Attending: NURSE PRACTITIONER | Admitting: RADIOLOGY
Payer: COMMERCIAL

## 2019-12-05 VITALS
HEART RATE: 53 BPM | OXYGEN SATURATION: 98 % | SYSTOLIC BLOOD PRESSURE: 123 MMHG | RESPIRATION RATE: 16 BRPM | DIASTOLIC BLOOD PRESSURE: 79 MMHG

## 2019-12-05 VITALS
DIASTOLIC BLOOD PRESSURE: 68 MMHG | OXYGEN SATURATION: 97 % | SYSTOLIC BLOOD PRESSURE: 117 MMHG | RESPIRATION RATE: 16 BRPM

## 2019-12-05 LAB
INTERPRETATION ECG - MUSE: NORMAL
MAGNESIUM SERPL-MCNC: 2 MG/DL (ref 1.6–2.3)
POTASSIUM SERPL-SCNC: 4.2 MMOL/L (ref 3.4–5.3)

## 2019-12-05 PROCEDURE — 93010 ELECTROCARDIOGRAM REPORT: CPT | Mod: 76 | Performed by: INTERNAL MEDICINE

## 2019-12-05 PROCEDURE — 37000008 ZZH ANESTHESIA TECHNICAL FEE, 1ST 30 MIN

## 2019-12-05 PROCEDURE — 92960 CARDIOVERSION ELECTRIC EXT: CPT

## 2019-12-05 PROCEDURE — 40000065 ZZH STATISTIC EKG NON-CHARGEABLE

## 2019-12-05 PROCEDURE — 25000125 ZZHC RX 250: Performed by: NURSE ANESTHETIST, CERTIFIED REGISTERED

## 2019-12-05 PROCEDURE — 36415 COLL VENOUS BLD VENIPUNCTURE: CPT | Performed by: NURSE PRACTITIONER

## 2019-12-05 PROCEDURE — 40000166 ZZH STATISTIC PP CARE STAGE 1

## 2019-12-05 PROCEDURE — 92960 CARDIOVERSION ELECTRIC EXT: CPT | Performed by: NURSE PRACTITIONER

## 2019-12-05 PROCEDURE — 83735 ASSAY OF MAGNESIUM: CPT | Performed by: NURSE PRACTITIONER

## 2019-12-05 PROCEDURE — 84132 ASSAY OF SERUM POTASSIUM: CPT | Performed by: NURSE PRACTITIONER

## 2019-12-05 RX ORDER — LIDOCAINE 40 MG/G
CREAM TOPICAL
Status: DISCONTINUED | OUTPATIENT
Start: 2019-12-05 | End: 2019-12-06 | Stop reason: HOSPADM

## 2019-12-05 RX ORDER — POTASSIUM CHLORIDE 1500 MG/1
40 TABLET, EXTENDED RELEASE ORAL
Status: DISCONTINUED | OUTPATIENT
Start: 2019-12-05 | End: 2019-12-06 | Stop reason: HOSPADM

## 2019-12-05 RX ORDER — POTASSIUM CHLORIDE 1500 MG/1
20 TABLET, EXTENDED RELEASE ORAL
Status: DISCONTINUED | OUTPATIENT
Start: 2019-12-05 | End: 2019-12-06 | Stop reason: HOSPADM

## 2019-12-05 RX ADMIN — METHOHEXITAL SODIUM 50 MG: 500 INJECTION, POWDER, LYOPHILIZED, FOR SOLUTION INTRAMUSCULAR; INTRAVENOUS; RECTAL at 08:31

## 2019-12-05 ASSESSMENT — ENCOUNTER SYMPTOMS: DYSRHYTHMIAS: 1

## 2019-12-05 NOTE — ANESTHESIA POSTPROCEDURE EVALUATION
Anesthesia POST Procedure Evaluation    Patient: Augusto Meeks   MRN:     [id number] Gender:   male   Age:    59 year old :      1960        Preoperative Diagnosis: Atrial fibrillation (H) [I48.91]   Procedure(s):  CARDIOVERSION   Postop Comments: No value filed.       Anesthesia Type:  Not documented  No value filed.    Reportable Event: NO     PAIN: Uncomplicated   Sign Out status: Comfortable, Well controlled pain     PONV: No PONV   Sign Out status:  No Nausea or Vomiting     Neuro/Psych: Uneventful perioperative course   Sign Out Status: Preoperative baseline; Age appropriate mentation     Airway/Resp.: Uneventful perioperative course   Sign Out Status: Non labored breathing, age appropriate RR; Resp. Status within EXPECTED Parameters     CV: Uneventful perioperative course   Sign Out status: Appropriate BP and perfusion indices; Appropriate HR/Rhythm     Disposition:   Sign-Out Location: .  Disposition:  Home  Recovery Course: Uneventful  Follow-Up: Not required     Comments/Narrative:  No noted anesthetic complications.  Patient satisfied with anesthetic.             Last Anesthesia Record Vitals:      Last PACU Vitals:  Vitals Value Taken Time   /70 2019  9:00 AM   Temp     Pulse     Resp 16 2019  9:00 AM   SpO2 99 % 2019  9:00 AM   Temp src     NIBP     Pulse     SpO2     Resp     Temp     Ht Rate     Temp 2           Electronically Signed By: Orlando Caruso MD, 2019, 9:14 AM

## 2019-12-05 NOTE — PROGRESS NOTES
Patient tolerated recovery stage well. VSS, denies pain.  Post- EKG done.  Patient tolerated PO fluids. Teaching was done by prior RN and discharge instructions were given. Patient ambulated and PIV was removed. Patient discharged from the hospital to home with wife @ 0920.

## 2019-12-05 NOTE — ANESTHESIA PREPROCEDURE EVALUATION
Anesthesia Pre-Procedure Evaluation    Patient: Augusto Meeks   MRN:     [id number] Gender:   male   Age:    59 year old :      1960        Preoperative Diagnosis: Atrial fibrillation (H) [I48.91]   Procedure(s):  CARDIOVERSION     Past Medical History:   Diagnosis Date     A-fib (H)       Past Surgical History:   Procedure Laterality Date     ANESTHESIA CARDIOVERSION N/A 2019    Procedure: Anesthesia Coverage Transesophageal Echocardiogram, Cardioversion @0930;  Surgeon: GENERIC ANESTHESIA PROVIDER;  Location: [facility] OR     ANESTHESIA CARDIOVERSION N/A 10/30/2019    Procedure: ANESTHESIA, FOR CARDIOVERSION @1100;  Surgeon: GENERIC ANESTHESIA PROVIDER;  Location: [facility] OR     CARDIOVERSION  10/30/2019    Patient reported he had a cardioverson on 2019.     VASECTOMY            Anesthesia Evaluation     . Pt has had prior anesthetic. Type: MAC    No history of anesthetic complications          ROS/MED HX    ENT/Pulmonary: Comment: PSG pending    (+)GABRIEL risk factors snores loudly, obese, , . .    Neurologic:  - neg neurologic ROS     Cardiovascular:     (+) hypertension----. : . . . :. dysrhythmias a-fib, .       METS/Exercise Tolerance:     Hematologic:         Musculoskeletal:         GI/Hepatic:  - neg GI/hepatic ROS       Renal/Genitourinary:  - ROS Renal section negative       Endo:  - neg endo ROS       Psychiatric:         Infectious Disease:  - neg infectious disease ROS       Malignancy:         Other:                         PHYSICAL EXAM:   Mental Status/Neuro: A/A/O   Airway: Facies: Feasible (thick neck)  Mallampati: I  Mouth/Opening: Full  TM distance: > 6 cm  Neck ROM: Full   Respiratory: Auscultation: CTAB     Resp. Rate: Normal     Resp. Effort: Normal      CV: Rhythm: Regular  Rate: Age appropriate  Heart: Normal Sounds  Edema: None   Comments:      Dental: Normal Dentition                LABS:  CBC: No results found for: WBC, HGB, HCT, PLT  BMP:   Lab Results   Component Value Date  "    11/15/2019     02/28/2018    POTASSIUM 4.2 12/05/2019    POTASSIUM 4.4 11/15/2019    CHLORIDE 104 11/15/2019    CHLORIDE 103 02/28/2018    CO2 28 11/15/2019    CO2 25 02/28/2018    BUN 16 11/15/2019    BUN 15 02/28/2018    CR 0.89 11/15/2019    CR 0.89 02/28/2018     (H) 11/15/2019     (H) 02/28/2018     COAGS:   Lab Results   Component Value Date    INR 1.39 (H) 09/25/2019     POC: No results found for: BGM, HCG, HCGS  OTHER:   Lab Results   Component Value Date    A1C 4.8 11/15/2019    CHALINO 9.2 11/15/2019    MAG 2.0 12/05/2019    ALBUMIN 4.2 11/15/2019    PROTTOTAL 7.7 11/15/2019    ALT 48 11/15/2019    AST 20 11/15/2019    ALKPHOS 71 11/15/2019    BILITOTAL 0.7 11/15/2019    TSH 2.25 11/15/2019    T4 1.06 11/15/2019        Preop Vitals    BP Readings from Last 3 Encounters:   12/05/19 120/70   12/05/19 123/79   12/03/19 109/75    Pulse Readings from Last 3 Encounters:   12/05/19 53   12/03/19 73   11/15/19 73      Resp Readings from Last 3 Encounters:   12/05/19 16   12/05/19 16   10/30/19 20    SpO2 Readings from Last 3 Encounters:   12/05/19 99%   12/05/19 98%   12/03/19 100%      Temp Readings from Last 1 Encounters:   11/15/19 36.2  C (97.2  F) (Oral)    Ht Readings from Last 1 Encounters:   11/15/19 1.803 m (5' 11\")      Wt Readings from Last 1 Encounters:   12/03/19 107 kg (236 lb)    Estimated body mass index is 32.92 kg/m  as calculated from the following:    Height as of 11/15/19: 1.803 m (5' 11\").    Weight as of 12/3/19: 107 kg (236 lb).     LDA:  Peripheral IV 09/25/19 Left (Active)   Number of days: 71       Peripheral IV 12/05/19 Left (Active)   Number of days: 0        Assessment:   ASA SCORE: 2    H&P: History and physical reviewed and following examination; no interval change.    NPO Status: NPO Appropriate     Plan:   Anes. Type:  MAC   Pre-Medication: None   Induction:  N/a (brevital)   Airway: Native Airway   Access/Monitoring: PIV   Maintenance: N/a     Postop " Plan:   Postop Pain: None  Postop Sedation/Airway: Not planned     CONSENT: Direct conversation   Plan and risks discussed with: Patient          Comments for Plan/Consent:  ______________________________________________________________________  I discussed the risks and benefits of MAC with deep sedation with the patient.  Questions were sought and answered.      Orlando Caruso MD  Attending Anesthesiologist                   Orlando Caruso MD

## 2019-12-05 NOTE — PROGRESS NOTES
Patient arrived on 2A post cardioversion.  A/O x4.  VSS.  Denies pain.  Tolerating po fluids.  Wife at bedside.  EKG pending.

## 2019-12-05 NOTE — PROGRESS NOTES
Pt arrived in ECHO department for scheduled Cardioversion.   Procedure explained, questions answered and consent signed. Discharge instructions discussed with patient.  Anesthesia gave pt 50 mg IV brevitol for sedation and pt was DCCV at 150 Joules to a SR/SB.  Pt denied chest pain after procedure and VSS.   Report given to 2A RN.

## 2019-12-05 NOTE — ANESTHESIA CARE TRANSFER NOTE
Patient: Augusto Meeks    Procedure(s):  CARDIOVERSION    Diagnosis: Atrial fibrillation (H) [I48.91]  Diagnosis Additional Information: No value filed.    Anesthesia Type:   No value filed.     Note:  Airway :Nasal Cannula  Destination: ECHO.  Comments: VSS.  Report given to RN.      Vitals: (Last set prior to Anesthesia Care Transfer)    CRNA VITALS  12/5/2019 0805 - 12/5/2019 0835      12/5/2019             NIBP:  145/90    Ht Rate:  Sat  RR  57  100  14                Electronically Signed By: JELANI Griffith CRNA  December 5, 2019  8:35 AM

## 2019-12-06 LAB — INTERPRETATION ECG - MUSE: NORMAL

## 2019-12-10 ENCOUNTER — MYC MEDICAL ADVICE (OUTPATIENT)
Dept: CARDIOLOGY | Facility: CLINIC | Age: 59
End: 2019-12-10

## 2019-12-10 DIAGNOSIS — I48.0 PAROXYSMAL ATRIAL FIBRILLATION (H): ICD-10-CM

## 2019-12-10 RX ORDER — METOPROLOL SUCCINATE 25 MG/1
50 TABLET, EXTENDED RELEASE ORAL DAILY
Qty: 90 TABLET | Refills: 3 | COMMUNITY
Start: 2019-12-10 | End: 2019-12-20

## 2019-12-20 ENCOUNTER — MYC REFILL (OUTPATIENT)
Dept: FAMILY MEDICINE | Facility: CLINIC | Age: 59
End: 2019-12-20

## 2019-12-20 DIAGNOSIS — I48.0 PAROXYSMAL ATRIAL FIBRILLATION (H): ICD-10-CM

## 2019-12-20 RX ORDER — METOPROLOL SUCCINATE 25 MG/1
50 TABLET, EXTENDED RELEASE ORAL DAILY
Qty: 180 TABLET | Refills: 0 | Status: SHIPPED | OUTPATIENT
Start: 2019-12-20 | End: 2020-01-22

## 2020-01-22 ENCOUNTER — OFFICE VISIT (OUTPATIENT)
Dept: CARDIOLOGY | Facility: CLINIC | Age: 60
End: 2020-01-22
Payer: COMMERCIAL

## 2020-01-22 ENCOUNTER — TRANSFERRED RECORDS (OUTPATIENT)
Dept: HEALTH INFORMATION MANAGEMENT | Facility: CLINIC | Age: 60
End: 2020-01-22

## 2020-01-22 VITALS
HEART RATE: 75 BPM | SYSTOLIC BLOOD PRESSURE: 127 MMHG | WEIGHT: 238 LBS | BODY MASS INDEX: 33.19 KG/M2 | DIASTOLIC BLOOD PRESSURE: 78 MMHG | OXYGEN SATURATION: 98 %

## 2020-01-22 DIAGNOSIS — E66.01 MORBID OBESITY (H): ICD-10-CM

## 2020-01-22 DIAGNOSIS — I10 BENIGN ESSENTIAL HYPERTENSION: Primary | ICD-10-CM

## 2020-01-22 DIAGNOSIS — I48.11 LONGSTANDING PERSISTENT ATRIAL FIBRILLATION (H): ICD-10-CM

## 2020-01-22 PROCEDURE — 99214 OFFICE O/P EST MOD 30 MIN: CPT | Performed by: INTERNAL MEDICINE

## 2020-01-22 PROCEDURE — 93000 ELECTROCARDIOGRAM COMPLETE: CPT | Performed by: INTERNAL MEDICINE

## 2020-01-22 RX ORDER — METOPROLOL SUCCINATE 50 MG/1
50 TABLET, EXTENDED RELEASE ORAL DAILY
Qty: 90 TABLET | Refills: 3 | Status: SHIPPED | OUTPATIENT
Start: 2020-01-22 | End: 2020-03-17

## 2020-01-22 NOTE — PROGRESS NOTES
Referring provider: Yady PALOMARES, YESSICA    HPI: Mr. Augusto Meeks is a 59 year old  male with PMH significant for persistent atrial fibrillation status post cardioversion x3, morbid obesity and hypertension.    The patient returns to clinic for follow-up after third DC cardioversion on December 5, 2019.  Today's EKG shows atrial flutter.  The patient wears apple watch and he is able to track his rhythm every day.  He tells me today that he stays in sinus rhythm for few days after each cardioversion and flipps back into atrial fibrillation. He reports no symptoms related to atrial fibrillation.  He even tells me today that he feels better in atrial fibrillation then normal rhythm.  Patient was recently on vacation and he walked for several miles every day.  Denies exertional chest pain, shortness of breath, palpitations, dizziness, syncope or lower extremity edema.    Patient was first diagnosed with atrial fibrillation in September 2019.  First time he was told about irregular heart rhythm was when he walked into a [facility] clinic for cold symptoms.  He was told he has irregular rhythm.  He did not have any symptoms at that time.  Subsequently he started his follow-up with cardiology and underwent 3 cardioversions.  He was started on flecainide and he is currently on 100 mg twice daily along with metoprolol 50 mg and Xarelto 20 mg.  He takes losartan 50 mg for blood pressure control.Patient denies side effects related to medications.    No prior history of CAD, stroke, diabetes, smoking.  He drinks alcohol but not every day.  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    I reviewed patient's echocardiogram from September 2019 which shows normal biventricular size and function with EF of 60%.  No significant valve disease noted.    I have reviewed patient's EKG in clinic today which shows atrial flutter heart rate 65 bpm.      Medications, personal, family, and social history reviewed  with patient and revised.    PAST MEDICAL HISTORY:  Past Medical History:   Diagnosis Date     A-fib (H) 2019       CURRENT MEDICATIONS:  Current Outpatient Medications   Medication Sig Dispense Refill     flecainide (TAMBOCOR) 100 MG tablet Take 1 tablet (100 mg) by mouth 2 times daily 180 tablet 1     losartan (COZAAR) 50 MG tablet Take 1 tablet (50 mg) by mouth daily 90 tablet 3     metoprolol succinate ER (TOPROL-XL) 25 MG 24 hr tablet Take 2 tablets (50 mg) by mouth daily 180 tablet 0     rivaroxaban ANTICOAGULANT (XARELTO) 20 MG TABS tablet Take 1 tablet (20 mg) by mouth daily (with dinner) 90 tablet 3       PAST SURGICAL HISTORY:  Past Surgical History:   Procedure Laterality Date     ANESTHESIA CARDIOVERSION N/A 9/25/2019    Procedure: Anesthesia Coverage Transesophageal Echocardiogram, Cardioversion @0930;  Surgeon: GENERIC ANESTHESIA PROVIDER;  Location: [facility]     ANESTHESIA CARDIOVERSION N/A 10/30/2019    Procedure: ANESTHESIA, FOR CARDIOVERSION @1100;  Surgeon: GENERIC ANESTHESIA PROVIDER;  Location: [facility]     ANESTHESIA CARDIOVERSION N/A 12/5/2019    Procedure: CARDIOVERSION;  Surgeon: GENERIC ANESTHESIA PROVIDER;  Location: [facility]     CARDIOVERSION  10/30/2019    Patient reported he had a cardioverson on 09/25/2019.     VASECTOMY         ALLERGIES:     Allergies   Allergen Reactions     Lisinopril Cough       FAMILY HISTORY:  Family History   Problem Relation Age of Onset     Lung Cancer Mother      Cancer Father          SOCIAL HISTORY:  Social History     Tobacco Use     Smoking status: Former Smoker     Packs/day: 0.00     Smokeless tobacco: Never Used   Substance Use Topics     Alcohol use: Yes     Drug use: Yes     Types: Marijuana       ROS:   Constitutional: No fever, chills, or sweats. Weight stable.   ENT: No visual disturbance, ear ache, epistaxis, sore throat.   Cardiovascular: As per HPI.   Respiratory: No cough, hemoptysis.    GI: No nausea, vomiting, hematemesis, melena, or hematochezia.    : No hematuria.   Integument: Negative.   Psychiatric: Negative.   Hematologic:  No easy bruising, no easy bleeding.  Neuro: Negative.   Endocrinology: No significant heat or cold intolerance   Musculoskeletal: No myalgia.    Exam:  /78 (BP Location: Left arm, Patient Position: Chair, Cuff Size: Adult Large)   Pulse 75   Wt 108 kg (238 lb)   SpO2 98%   BMI 33.19 kg/m    GENERAL APPEARANCE: alert and no distress  HEENT: no icterus, no central cyanosis  LYMPH/NECK: no adenopathy, no asymmetry, JVP not elevated, no carotid bruits.  RESPIRATORY: lungs clear to auscultation - no rales, rhonchi or wheezes, no use of accessory muscles, no retractions, respirations are unlabored, normal respiratory rate  CARDIOVASCULAR:irregular rhythm, normal S1, S2, no S3 or S4 and no murmur, click or rub, precordium quiet with normal PMI.  GI: soft, non tender  EXTREMITIES: peripheral pulses normal, no edema  NEURO: alert, normal speech,and affect  VASC: Radial, dorsalis pedis and posterior tibialis pulses are normal in volumes and symmetric bilaterally.   SKIN: no ecchymoses, no rashes     I have reviewed the labs and personally reviewed the imaging below and made my comment in the assessment and plan.    Labs:  CBC RESULTS: [facility] 11/2/2019  Hemoglobin 13.6, white blood cell count 11.0, platelets 159K    No results found for: WBC, RBC, HGB, HCT, MCV, MCH, MCHC, RDW, PLT    BMP RESULTS:  Lab Results   Component Value Date     11/15/2019    POTASSIUM 4.2 12/05/2019    CHLORIDE 104 11/15/2019    CO2 28 11/15/2019    ANIONGAP 6 11/15/2019     (H) 11/15/2019    BUN 16 11/15/2019    CR 0.89 11/15/2019    GFRESTIMATED >90 11/15/2019    GFRESTBLACK >90 11/15/2019    CHALINO 9.2 11/15/2019      Echocardiogram 9/23/2019  Global and regional left ventricular function is normal with an EF of 60-65%.  Right ventricular function, chamber size, wall motion, and thickness are  normal.  Both atria appear normal.  The  inferior vena cava is normal.  No pericardial effusion is present.      EKG12/5/2019 atrial fibrillation       Assessment and Plan:   Mr. Augusto Meeks is a 59 year old  male with PMH significant for persistent atrial fibrillation status post cardioversion x3, morbid obesity and hypertension.    1.  Persistent atrial fibrillation: Patient is diagnosed with persistent atrial fibrillation in September 2019.  He was not symptomatic when he was told about irregular heart rhythm when he walked into a [facility] clinic for cold symptoms.  Since then the patient underwent 3 cardioversions which failed to maintain sinus rhythm despite flecainide 100 mg twice daily.  The patient overall feels well and does not want to undergo repeated cardioversions, try a different antiarrhythmic or catheter ablation since he does not have symptoms attributable to atrial fibrillation.  He is able to do all his physical activities with no limitation.  At this point I recommended to discontinue flecainide and continue rate control with metoprolol 50 mg daily and rivaroxaban 20 mg daily.  Patient has normal LV function.  Rate is well controlled on today's EKG which shows atrial flutter.    2.  Morbid obesity: The patient underwent sleep study to rule out sleep apnea and he is awaiting results.  He appears determined to lose some more weight.    3.  Hypertension: Currently well controlled with losartan 50 mg.    Medication change today: Discontinue flecainide 100 mg twice daily    Recommendations:  Continue metoprolol 50 mg daily  Continue losartan 50 mg daily  Continue rivaroxaban 20 mg daily    Return to clinic 1 year.    A total of 30 minutes spent face-toface with greater than 50% of the time spent in counseling and coordinating cares of the issues above.     Please donot hesitate to contact me if you have any questions or concerns. Again, thank you for allowing me to participate in the care of your patient.    Tamia MENJIVAR MD  [facility]  [place] Division of Cardiology  Pager [phone]

## 2020-01-22 NOTE — LETTER
1/22/2020      RE: Augusto Meeks  [address] [facility] [facility] [address]       Dear Colleague,    Thank you for the opportunity to participate in the care of your patient, Augusto Meeks, at the [facility] AT [facility]. Please see a copy of my visit note below.    Referring provider: Yady PALOMARES, CNP    HPI: Mr. Augusto Meeks is a 59 year old  male with PMH significant for persistent atrial fibrillation status post cardioversion x3, morbid obesity and hypertension.    The patient returns to clinic for follow-up after third DC cardioversion on December 5, 2019.  Today's EKG shows atrial flutter.  The patient wears apple watch and he is able to track his rhythm every day.  He tells me today that he stays in sinus rhythm for few days after each cardioversion and flipps back into atrial fibrillation. He reports no symptoms related to atrial fibrillation.  He even tells me today that he feels better in atrial fibrillation then normal rhythm.  Patient was recently on vacation and he walked for several miles every day.  Denies exertional chest pain, shortness of breath, palpitations, dizziness, syncope or lower extremity edema.    Patient was first diagnosed with atrial fibrillation in September 2019.  First time he was told about irregular heart rhythm was when he walked into a [facility] clinic for cold symptoms.  He was told he has irregular rhythm.  He did not have any symptoms at that time.  Subsequently he started his follow-up with cardiology and underwent 3 cardioversions.  He was started on flecainide and he is currently on 100 mg twice daily along with metoprolol 50 mg and Xarelto 20 mg.  He takes losartan 50 mg for blood pressure control.Patient denies side effects related to medications.    No prior history of CAD, stroke, diabetes, smoking.  He drinks alcohol but not every day.  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    I reviewed patient's echocardiogram from September 2019 which shows normal biventricular size and function with EF of 60%.  No significant valve disease noted.    I have reviewed patient's EKG in clinic today which shows atrial flutter heart rate 65 bpm.      Medications, personal, family, and social history reviewed with patient and revised.    PAST MEDICAL HISTORY:  Past Medical History:   Diagnosis Date     A-fib (H) 2019       CURRENT MEDICATIONS:  Current Outpatient Medications   Medication Sig Dispense Refill     flecainide (TAMBOCOR) 100 MG tablet Take 1 tablet (100 mg) by mouth 2 times daily 180 tablet 1     losartan (COZAAR) 50 MG tablet Take 1 tablet (50 mg) by mouth daily 90 tablet 3     metoprolol succinate ER (TOPROL-XL) 25 MG 24 hr tablet Take 2 tablets (50 mg) by mouth daily 180 tablet 0     rivaroxaban ANTICOAGULANT (XARELTO) 20 MG TABS tablet Take 1 tablet (20 mg) by mouth daily (with dinner) 90 tablet 3       PAST SURGICAL HISTORY:  Past Surgical History:   Procedure Laterality Date     ANESTHESIA CARDIOVERSION N/A 9/25/2019    Procedure: Anesthesia Coverage Transesophageal Echocardiogram, Cardioversion @0930;  Surgeon: GENERIC ANESTHESIA PROVIDER;  Location: [facility]     ANESTHESIA CARDIOVERSION N/A 10/30/2019    Procedure: ANESTHESIA, FOR CARDIOVERSION @1100;  Surgeon: GENERIC ANESTHESIA PROVIDER;  Location: [facility]     ANESTHESIA CARDIOVERSION N/A 12/5/2019    Procedure: CARDIOVERSION;  Surgeon: GENERIC ANESTHESIA PROVIDER;  Location: [facility]     CARDIOVERSION  10/30/2019    Patient reported he had a cardioverson on 09/25/2019.     VASECTOMY         ALLERGIES:     Allergies   Allergen Reactions     Lisinopril Cough       FAMILY HISTORY:  Family History   Problem Relation Age of Onset     Lung Cancer Mother      Cancer Father          SOCIAL HISTORY:  Social History     Tobacco Use     Smoking status: Former Smoker     Packs/day: 0.00     Smokeless tobacco: Never Used    Substance Use Topics     Alcohol use: Yes     Drug use: Yes     Types: Marijuana       ROS:   Constitutional: No fever, chills, or sweats. Weight stable.   ENT: No visual disturbance, ear ache, epistaxis, sore throat.   Cardiovascular: As per HPI.   Respiratory: No cough, hemoptysis.    GI: No nausea, vomiting, hematemesis, melena, or hematochezia.   : No hematuria.   Integument: Negative.   Psychiatric: Negative.   Hematologic:  No easy bruising, no easy bleeding.  Neuro: Negative.   Endocrinology: No significant heat or cold intolerance   Musculoskeletal: No myalgia.    Exam:  /78 (BP Location: Left arm, Patient Position: Chair, Cuff Size: Adult Large)   Pulse 75   Wt 108 kg (238 lb)   SpO2 98%   BMI 33.19 kg/m     GENERAL APPEARANCE: alert and no distress  HEENT: no icterus, no central cyanosis  LYMPH/NECK: no adenopathy, no asymmetry, JVP not elevated, no carotid bruits.  RESPIRATORY: lungs clear to auscultation - no rales, rhonchi or wheezes, no use of accessory muscles, no retractions, respirations are unlabored, normal respiratory rate  CARDIOVASCULAR:irregular rhythm, normal S1, S2, no S3 or S4 and no murmur, click or rub, precordium quiet with normal PMI.  GI: soft, non tender  EXTREMITIES: peripheral pulses normal, no edema  NEURO: alert, normal speech,and affect  VASC: Radial, dorsalis pedis and posterior tibialis pulses are normal in volumes and symmetric bilaterally.   SKIN: no ecchymoses, no rashes     I have reviewed the labs and personally reviewed the imaging below and made my comment in the assessment and plan.    Labs:  CBC RESULTS: [facility] 11/2/2019  Hemoglobin 13.6, white blood cell count 11.0, platelets 159K    No results found for: WBC, RBC, HGB, HCT, MCV, MCH, MCHC, RDW, PLT    BMP RESULTS:  Lab Results   Component Value Date     11/15/2019    POTASSIUM 4.2 12/05/2019    CHLORIDE 104 11/15/2019    CO2 28 11/15/2019    ANIONGAP 6 11/15/2019     (H) 11/15/2019     BUN 16 11/15/2019    CR 0.89 11/15/2019    GFRESTIMATED >90 11/15/2019    GFRESTBLACK >90 11/15/2019    CHALINO 9.2 11/15/2019      Echocardiogram 9/23/2019  Global and regional left ventricular function is normal with an EF of 60-65%.  Right ventricular function, chamber size, wall motion, and thickness are  normal.  Both atria appear normal.  The inferior vena cava is normal.  No pericardial effusion is present.      EKG12/5/2019 atrial fibrillation       Assessment and Plan:   Mr. Augusto Meeks is a 59 year old  male with PMH significant for persistent atrial fibrillation status post cardioversion x3, morbid obesity and hypertension.    1.  Persistent atrial fibrillation: Patient is diagnosed with persistent atrial fibrillation in September 2019.  He was not symptomatic when he was told about irregular heart rhythm when he walked into a [facility] clinic for cold symptoms.  Since then the patient underwent 3 cardioversions which failed to maintain sinus rhythm despite flecainide 100 mg twice daily.  The patient overall feels well and does not want to undergo repeated cardioversions, try a different antiarrhythmic or catheter ablation since he does not have symptoms attributable to atrial fibrillation.  He is able to do all his physical activities with no limitation.  At this point I recommended to discontinue flecainide and continue rate control with metoprolol 50 mg daily and rivaroxaban 20 mg daily.  Patient has normal LV function.  Rate is well controlled on today's EKG which shows atrial flutter.    2.  Morbid obesity: The patient underwent sleep study to rule out sleep apnea and he is awaiting results.  He appears determined to lose some more weight.    3.  Hypertension: Currently well controlled with losartan 50 mg.    Medication change today: Discontinue flecainide 100 mg twice daily    Recommendations:  Continue metoprolol 50 mg daily  Continue losartan 50 mg daily  Continue rivaroxaban 20 mg daily    Return to  clinic 1 year.    A total of 30 minutes spent face-toface with greater than 50% of the time spent in counseling and coordinating cares of the issues above.     Please donot hesitate to contact me if you have any questions or concerns. Again, thank you for allowing me to participate in the care of your patient.    Tamia MENJIVAR MD  [facility] Division of Cardiology  Pager [phone]

## 2020-01-22 NOTE — PATIENT INSTRUCTIONS
Thank you for coming to the [facility] @ [place]; please note the following instructions:    1. Stop Flecainide    2. Finish supply of metoprolol 25 mg, then start toprol xl (metoprolol succinate) 50 mg once daily    3. Follow up with Dr Holland in 1 year      If you have any questions regarding your visit please contact your care team:     Cardiology  Telephone Number   Beronica JORDAN., RN  Delisa TIRADO, RN   Lyric PARRISH, LOWELLA  Yoselin SOLIS, MAYRA TRAN, LPN   [phone] (option 1)   For scheduling appts:     [phone] (select option 1)       For the Device Clinic (Pacemakers and ICD's)  RN's :  Arleth Scanlon   During business hours: [phone]    *After business hours:  [phone] (select option 4)      Normal test result notifications will be released via Desino or mailed within 7 business days.  All other test results, will be communicated via telephone once reviewed by your cardiologist.    If you need a medication refill please contact your pharmacy.  Please allow 3 business days for your refill to be completed.    As always, thank you for trusting us with your health care needs!

## 2020-01-22 NOTE — NURSING NOTE
"Chief Complaint   Patient presents with     RECHECK     1 month s/p cardioversion. Pt reports he has been back in and out of Afib.        Initial There were no vitals taken for this visit. Estimated body mass index is 32.92 kg/m  as calculated from the following:    Height as of 11/15/19: 1.803 m (5' 11\").    Weight as of 12/3/19: 107 kg (236 lb)..  BP completed using cuff size: large    EKG was done.  Yoselin Hough MA  "

## 2020-02-11 ENCOUNTER — MYC MEDICAL ADVICE (OUTPATIENT)
Dept: FAMILY MEDICINE | Facility: CLINIC | Age: 60
End: 2020-02-11

## 2020-02-11 DIAGNOSIS — I10 BENIGN ESSENTIAL HYPERTENSION: Primary | ICD-10-CM

## 2020-02-11 RX ORDER — AMLODIPINE BESYLATE 5 MG/1
5 TABLET ORAL DAILY
Qty: 90 TABLET | Refills: 3 | Status: SHIPPED | OUTPATIENT
Start: 2020-02-11 | End: 2020-05-05

## 2020-02-11 NOTE — TELEPHONE ENCOUNTER
Patient reports having a dry cough after starting Losartan and would like to try a different medication.  Was previously prescribed by Yady Barajas (no longer in cardiology) routing to current cardiologist.   Aliyah Grider RN

## 2020-02-24 ENCOUNTER — HEALTH MAINTENANCE LETTER (OUTPATIENT)
Age: 60
End: 2020-02-24

## 2020-03-16 DIAGNOSIS — I10 BENIGN ESSENTIAL HYPERTENSION: Primary | ICD-10-CM

## 2020-03-17 RX ORDER — METOPROLOL SUCCINATE 50 MG/1
50 TABLET, EXTENDED RELEASE ORAL DAILY
Qty: 90 TABLET | Refills: 1 | Status: SHIPPED | OUTPATIENT
Start: 2020-03-17 | End: 2020-12-10

## 2020-09-10 ENCOUNTER — VIRTUAL VISIT (OUTPATIENT)
Dept: FAMILY MEDICINE | Facility: CLINIC | Age: 60
End: 2020-09-10
Payer: COMMERCIAL

## 2020-09-10 DIAGNOSIS — B35.6 TINEA CRURIS: Primary | ICD-10-CM

## 2020-09-10 PROCEDURE — 99213 OFFICE O/P EST LOW 20 MIN: CPT | Mod: 95 | Performed by: NURSE PRACTITIONER

## 2020-09-10 RX ORDER — PRENATAL VIT 91/IRON/FOLIC/DHA 28-975-200
COMBINATION PACKAGE (EA) ORAL 2 TIMES DAILY
Qty: 84 G | Refills: 1 | Status: SHIPPED | OUTPATIENT
Start: 2020-09-10 | End: 2020-10-12

## 2020-09-10 RX ORDER — FLUCONAZOLE 150 MG/1
150 TABLET ORAL ONCE
Qty: 1 TABLET | Refills: 0 | Status: SHIPPED | OUTPATIENT
Start: 2020-09-10 | End: 2020-09-10

## 2020-09-10 NOTE — PROGRESS NOTES
"Augusto Meeks is a 59 year old male who is being evaluated via a billable video visit.      The patient has been notified of following:     \"This video visit will be conducted via a call between you and your physician/provider. We have found that certain health care needs can be provided without the need for an in-person physical exam.  This service lets us provide the care you need with a video conversation.  If a prescription is necessary we can send it directly to your pharmacy.  If lab work is needed we can place an order for that and you can then stop by our lab to have the test done at a later time.    Video visits are billed at different rates depending on your insurance coverage.  Please reach out to your insurance provider with any questions.    If during the course of the call the physician/provider feels a video visit is not appropriate, you will not be charged for this service.\"    Patient has given verbal consent for Video visit? Yes  How would you like to obtain your AVS? MyChart  If you are dropped from the video visit, the video invite should be resent to: Text to cell phone: [phone]  Will anyone else be joining your video visit? No    Subjective     Augusto Meeks is a 59 year old male who presents today via video visit for the following health issues:    HPI    Rash  Onset/Duration: 3-4 weeks  Description  Location: Groin area  Character: painful, red, purple  Itching: mild to severe at times  Intensity:  moderate  Progression of Symptoms:  same  Accompanying signs and symptoms:   Fever: no  Body aches or joint pain: no  Sore throat symptoms: no  Recent cold symptoms: no  History:           Previous episodes of similar rash: None  New exposures:  None  Recent travel: no  Exposure to similar rash: no  Precipitating or alleviating factors: none  Therapies tried and outcome: OTC itch lotions and powders     Reports itching rash present in bilateral groin x 3-4 weeks. States similar to jock " itch that he had years ago.  He has been using [employer] lotions and powders with no improvement. Denies any rash on lower abdomen.     Video Start Time: 9:27      Review of Systems   Skin: Positive for rash.            Objective           Vitals:  No vitals were obtained today due to virtual visit.    Physical Exam     GENERAL: Healthy, alert and no distress  EYES: Eyes grossly normal to inspection.  No discharge or erythema, or obvious scleral/conjunctival abnormalities.  RESP: No audible wheeze, cough, or visible cyanosis.  No visible retractions or increased work of breathing.    NEURO: Cranial nerves grossly intact.  Mentation and speech appropriate for age.  PSYCH: Mentation appears normal, affect normal/bright, judgement and insight intact, normal speech and appearance well-groomed.            Assessment & Plan     1. Tinea cruris  - advised to apply cream for 1-2 weeks after resolution of symptoms. Keep skin clean and dry- change underwear if gets sweaty.   - terbinafine (LAMISIL) 1 % external cream; Apply topically 2 times daily Continue use 1-2 weeks after resolution of rash  Dispense: 84 g; Refill: 1  - fluconazole (DIFLUCAN) 150 MG tablet; Take 1 tablet (150 mg) by mouth once for 1 dose  Dispense: 1 tablet; Refill: 0         Return in about 6 weeks (around 10/22/2020) for worsening symptoms or failure to improve.    JELANI White CNP  [facility]      Video-Visit Details    Type of service:  Video Visit    Video End Time:9:33    Originating Location (pt. Location): Home    Distant Location (provider location):  [facility]     Platform used for Video Visit: Summer

## 2020-09-29 ENCOUNTER — MYC MEDICAL ADVICE (OUTPATIENT)
Dept: FAMILY MEDICINE | Facility: CLINIC | Age: 60
End: 2020-09-29

## 2020-10-01 ENCOUNTER — MYC MEDICAL ADVICE (OUTPATIENT)
Dept: FAMILY MEDICINE | Facility: CLINIC | Age: 60
End: 2020-10-01

## 2020-10-01 DIAGNOSIS — B35.6 TINEA CRURIS: Primary | ICD-10-CM

## 2020-10-02 ENCOUNTER — MYC MEDICAL ADVICE (OUTPATIENT)
Dept: FAMILY MEDICINE | Facility: CLINIC | Age: 60
End: 2020-10-02

## 2020-10-02 RX ORDER — FLUCONAZOLE 150 MG/1
150 TABLET ORAL ONCE
Qty: 1 TABLET | Refills: 0 | Status: SHIPPED | OUTPATIENT
Start: 2020-10-02 | End: 2020-10-02

## 2020-10-05 ENCOUNTER — VIRTUAL VISIT (OUTPATIENT)
Dept: CARDIOLOGY | Facility: CLINIC | Age: 60
End: 2020-10-05
Payer: COMMERCIAL

## 2020-10-05 DIAGNOSIS — I48.19 PERSISTENT ATRIAL FIBRILLATION (H): Primary | ICD-10-CM

## 2020-10-05 PROCEDURE — 99213 OFFICE O/P EST LOW 20 MIN: CPT | Mod: 95 | Performed by: INTERNAL MEDICINE

## 2020-10-05 NOTE — PROGRESS NOTES
"Augusto Meeks is a 59 year old male who is being evaluated via a billable video visit.      The patient has been notified of following:     \"This video visit will be conducted via a call between you and your physician/provider. We have found that certain health care needs can be provided without the need for an in-person physical exam.  This service lets us provide the care you need with a video conversation.  If a prescription is necessary we can send it directly to your pharmacy.  If lab work is needed we can place an order for that and you can then stop by our lab to have the test done at a later time.    Video visits are billed at different rates depending on your insurance coverage.  Please reach out to your insurance provider with any questions.    If during the course of the call the physician/provider feels a video visit is not appropriate, you will not be charged for this service.\"    Patient has given verbal consent for Video visit? Yes  How would you like to obtain your AVS? MyChart  If you are dropped from the video visit, the video invite should be resent to: Text to cell phone: [phone]  Will anyone else be joining your video visit? No        Video-Visit Details    Type of service:  Video Visit    Video Start Time: 8:40 AM  Video End Time: 8:55 AM    Originating Location (pt. Location): Home    Distant Location (provider location):  [facility]     Platform used for Video Visit: Summer    HPI: Purpose of visit: Follow-up of atrial fibrillation    HISTORY OF PRESENT ILLNESS:  Mr. Augusto Meeks is a 59-year-old gentleman with a medical history significant for hypertension.  The patient does not have any history of diabetes, coronary heart disease, previous strokes, TIAs or heart failure.      The patient's last visit with me was in October 2019.  After that visit, the patient was sent for electrical cardioversion which was successful in converting the patient to normal " sinus rhythm.  The patient is on Xarelto 20 mg once daily for stroke prophylaxis.      Immediately after the cardioversion, the patient felt symptomatically better.  He reports absence of fluttering sensation in her chest and feeling overall better.    Since then patient has undergone 2 more electrical cardioversions and each time was only able to maintain sinus rhythm for a few days.  He was last seen by Yady Barajas and also Dr. Holland and currently he is in atrial fibrillation and the flecainide has been discontinued.    Patient reports exertional dyspnea when he climbs 1-2 flights of stairs.  He denies palpitations, irregular heartbeat sensation, frequent lightheadedness, presyncope or syncope.    PAST MEDICAL HISTORY:  Past Medical History:   Diagnosis Date     A-fib (H) 2019       CURRENT MEDICATIONS:  Current Outpatient Medications   Medication Sig Dispense Refill     amLODIPine (NORVASC) 5 MG tablet Take 1 tablet (5 mg) by mouth daily 90 tablet 3     metoprolol succinate ER (TOPROL-XL) 50 MG 24 hr tablet Take 1 tablet (50 mg) by mouth daily 90 tablet 1     rivaroxaban ANTICOAGULANT (XARELTO) 20 MG TABS tablet Take 1 tablet (20 mg) by mouth daily (with dinner) 90 tablet 3     terbinafine (LAMISIL) 1 % external cream Apply topically 2 times daily Continue use 1-2 weeks after resolution of rash 84 g 1       PAST SURGICAL HISTORY:  Past Surgical History:   Procedure Laterality Date     ANESTHESIA CARDIOVERSION N/A 9/25/2019    Procedure: Anesthesia Coverage Transesophageal Echocardiogram, Cardioversion @0930;  Surgeon: GENERIC ANESTHESIA PROVIDER;  Location: [facility] OR     ANESTHESIA CARDIOVERSION N/A 10/30/2019    Procedure: ANESTHESIA, FOR CARDIOVERSION @1100;  Surgeon: GENERIC ANESTHESIA PROVIDER;  Location: [facility] OR     ANESTHESIA CARDIOVERSION N/A 12/5/2019    Procedure: CARDIOVERSION;  Surgeon: GENERIC ANESTHESIA PROVIDER;  Location: [facility] OR     CARDIOVERSION  10/30/2019    Patient reported he had a cardioverson on  09/25/2019.     VASECTOMY         ALLERGIES:     Allergies   Allergen Reactions     Lisinopril Cough       FAMILY HISTORY:  - Premature coronary artery disease  - Atrial fibrillation  - Sudden cardiac death     SOCIAL HISTORY:  Social History     Tobacco Use     Smoking status: Current Some Day Smoker     Years: 20.00     Types: Cigars     Smokeless tobacco: Never Used     Tobacco comment: occasional cigar   Substance Use Topics     Alcohol use: Yes     Drug use: Yes     Types: Marijuana       ROS:   Constitutional: No fever, chills, or sweats. Weight stable.   ENT: No visual disturbance, ear ache, epistaxis, sore throat.   Cardiovascular: As per HPI.   Respiratory: No cough, hemoptysis.    GI: No nausea, vomiting, hematemesis, melena, or hematochezia.   : No hematuria.   Integument: Negative.   Psychiatric: Negative.   Hematologic:  Easy bruising, no easy bleeding.  Neuro: Negative.   Endocrinology: No significant heat or cold intolerance   Musculoskeletal: No myalgia.    Exam:  There were no vitals taken for this visit.  GENERAL APPEARANCE: healthy, alert and no distress      Labs:  CBC RESULTS:   No results found for: WBC, RBC, HGB, HCT, MCV, MCH, MCHC, RDW, PLT    BMP RESULTS:  Lab Results   Component Value Date     11/15/2019    POTASSIUM 4.2 12/05/2019    CHLORIDE 104 11/15/2019    CO2 28 11/15/2019    ANIONGAP 6 11/15/2019     (H) 11/15/2019    BUN 16 11/15/2019    CR 0.89 11/15/2019    GFRESTIMATED >90 11/15/2019    GFRESTBLACK >90 11/15/2019    CHALINO 9.2 11/15/2019        INR RESULTS:  Lab Results   Component Value Date    INR 1.39 (H) 09/25/2019       Procedures:      Assessment and Plan:     Persistent atrial fibrillation- with exertional dyspnea on mild exertion    I discussed the aims, risks, and alternatives of rhythm control strategy versus rate control strategy.  I explained that the options for a rhythm control strategy include using a different antiarrhythmic medication such as  dronedarone.  We will meet again in person next week at the [facility] to discuss in detail catheter ablation. In the meantime, we will obtain a transthoracic echocardiogram this week to define the structure and function of the heart.    All questions and concerns were addressed and patient is happy with the plan.      CC  Patient Care Team:  Louann Ramirez APRN CNP as PCP - General (Nurse Practitioner - Family)  Louann Ramirez APRN CNP as Assigned PCP

## 2020-10-05 NOTE — PATIENT INSTRUCTIONS
Thank you for coming to the [facility] @ [place]; please note the following instructions:    1. Echocardiogram this week-we will call you to schedule    2.  Add-on clinic appointment with Dr. Clement on Monday, October 12th at 1:30 pm.      If you have any questions regarding your visit please contact your care team:     Cardiology  Telephone Number   Beronica JORDAN., RN  Delisa TIRADO, RN   Lyric PARRISH, RMA  Yoselin SOLIS, RMA  Kacie TRAN, LPN   [phone] (option 1)   For scheduling appts:     [phone] (select option 1)       For the Device Clinic (Pacemakers and ICD's)  RN's :  Arleth Scanlon   During business hours: [phone]    *After business hours:  [phone] (select option 4)      Normal test result notifications will be released via Pirate3D or mailed within 7 business days.  All other test results, will be communicated via telephone once reviewed by your cardiologist.    If you need a medication refill please contact your pharmacy.  Please allow 3 business days for your refill to be completed.    As always, thank you for trusting us with your health care needs!

## 2020-10-05 NOTE — LETTER
"10/5/2020      RE: Augusto Meeks  [address] [place] [facility] [address]       Dear Colleague,    Thank you for the opportunity to participate in the care of your patient, Augusto Meeks, at the [facility] HEART [facility]. Please see a copy of my visit note below.    Augusto Meeks is a 59 year old male who is being evaluated via a billable video visit.      The patient has been notified of following:     \"This video visit will be conducted via a call between you and your physician/provider. We have found that certain health care needs can be provided without the need for an in-person physical exam.  This service lets us provide the care you need with a video conversation.  If a prescription is necessary we can send it directly to your pharmacy.  If lab work is needed we can place an order for that and you can then stop by our lab to have the test done at a later time.    Video visits are billed at different rates depending on your insurance coverage.  Please reach out to your insurance provider with any questions.    If during the course of the call the physician/provider feels a video visit is not appropriate, you will not be charged for this service.\"    Patient has given verbal consent for Video visit? Yes  How would you like to obtain your AVS? MyChart  If you are dropped from the video visit, the video invite should be resent to: Text to cell phone: [phone]  Will anyone else be joining your video visit? No        Video-Visit Details    Type of service:  Video Visit    Video Start Time: 8:40 AM  Video End Time: 8:55 AM    Originating Location (pt. Location): Home    Distant Location (provider location):  [facility]     Platform used for Video Visit: Doximity    HPI: Purpose of visit: Follow-up of atrial fibrillation    HISTORY OF PRESENT ILLNESS:  Mr. Augusto Meeks is a 59-year-old gentleman with a medical " history significant for hypertension.  The patient does not have any history of diabetes, coronary heart disease, previous strokes, TIAs or heart failure.      The patient's last visit with me was in October 2019.  After that visit, the patient was sent for electrical cardioversion which was successful in converting the patient to normal sinus rhythm.  The patient is on Xarelto 20 mg once daily for stroke prophylaxis.      Immediately after the cardioversion, the patient felt symptomatically better.  He reports absence of fluttering sensation in her chest and feeling overall better.    Since then patient has undergone 2 more electrical cardioversions and each time was only able to maintain sinus rhythm for a few days.  He was last seen by Yady Barajas and also Dr. Holland and currently he is in atrial fibrillation and the flecainide has been discontinued.    Patient reports exertional dyspnea when he climbs 1-2 flights of stairs.  He denies palpitations, irregular heartbeat sensation, frequent lightheadedness, presyncope or syncope.    PAST MEDICAL HISTORY:  Past Medical History:   Diagnosis Date     A-fib (H) 2019       CURRENT MEDICATIONS:  Current Outpatient Medications   Medication Sig Dispense Refill     amLODIPine (NORVASC) 5 MG tablet Take 1 tablet (5 mg) by mouth daily 90 tablet 3     metoprolol succinate ER (TOPROL-XL) 50 MG 24 hr tablet Take 1 tablet (50 mg) by mouth daily 90 tablet 1     rivaroxaban ANTICOAGULANT (XARELTO) 20 MG TABS tablet Take 1 tablet (20 mg) by mouth daily (with dinner) 90 tablet 3     terbinafine (LAMISIL) 1 % external cream Apply topically 2 times daily Continue use 1-2 weeks after resolution of rash 84 g 1       PAST SURGICAL HISTORY:  Past Surgical History:   Procedure Laterality Date     ANESTHESIA CARDIOVERSION N/A 9/25/2019    Procedure: Anesthesia Coverage Transesophageal Echocardiogram, Cardioversion @8859;  Surgeon: GENERIC ANESTHESIA PROVIDER;  Location:  OR      ANESTHESIA CARDIOVERSION N/A 10/30/2019    Procedure: ANESTHESIA, FOR CARDIOVERSION @1100;  Surgeon: GENERIC ANESTHESIA PROVIDER;  Location: [facility] OR     ANESTHESIA CARDIOVERSION N/A 12/5/2019    Procedure: CARDIOVERSION;  Surgeon: GENERIC ANESTHESIA PROVIDER;  Location: [facility] OR     CARDIOVERSION  10/30/2019    Patient reported he had a cardioverson on 09/25/2019.     VASECTOMY         ALLERGIES:     Allergies   Allergen Reactions     Lisinopril Cough       FAMILY HISTORY:  - Premature coronary artery disease  - Atrial fibrillation  - Sudden cardiac death     SOCIAL HISTORY:  Social History     Tobacco Use     Smoking status: Current Some Day Smoker     Years: 20.00     Types: Cigars     Smokeless tobacco: Never Used     Tobacco comment: occasional cigar   Substance Use Topics     Alcohol use: Yes     Drug use: Yes     Types: Marijuana       ROS:   Constitutional: No fever, chills, or sweats. Weight stable.   ENT: No visual disturbance, ear ache, epistaxis, sore throat.   Cardiovascular: As per HPI.   Respiratory: No cough, hemoptysis.    GI: No nausea, vomiting, hematemesis, melena, or hematochezia.   : No hematuria.   Integument: Negative.   Psychiatric: Negative.   Hematologic:  Easy bruising, no easy bleeding.  Neuro: Negative.   Endocrinology: No significant heat or cold intolerance   Musculoskeletal: No myalgia.    Exam:  There were no vitals taken for this visit.  GENERAL APPEARANCE: healthy, alert and no distress      Labs:  CBC RESULTS:   No results found for: WBC, RBC, HGB, HCT, MCV, MCH, MCHC, RDW, PLT    BMP RESULTS:  Lab Results   Component Value Date     11/15/2019    POTASSIUM 4.2 12/05/2019    CHLORIDE 104 11/15/2019    CO2 28 11/15/2019    ANIONGAP 6 11/15/2019     (H) 11/15/2019    BUN 16 11/15/2019    CR 0.89 11/15/2019    GFRESTIMATED >90 11/15/2019    GFRESTBLACK >90 11/15/2019    CHALINO 9.2 11/15/2019        INR RESULTS:  Lab Results   Component Value Date    INR 1.39 (H) 09/25/2019        Procedures:      Assessment and Plan:     Persistent atrial fibrillation- with exertional dyspnea on mild exertion    I discussed the aims, risks, and alternatives of rhythm control strategy versus rate control strategy.  I explained that the options for a rhythm control strategy include using a different antiarrhythmic medication such as dronedarone.  We will meet again in person next week at the [facility] to discuss in detail catheter ablation. In the meantime, we will obtain a transthoracic echocardiogram this week to define the structure and function of the heart.    All questions and concerns were addressed and patient is happy with the plan.      CC  Patient Care Team:  Louann Ramirez APRN CNP as PCP - General (Nurse Practitioner - Family)  Louann Ramirez APRN CNP as Assigned PCP      Please do not hesitate to contact me if you have any questions/concerns.     Sincerely,     Vanessa Clement MD

## 2020-10-06 ENCOUNTER — HOSPITAL ENCOUNTER (OUTPATIENT)
Dept: CARDIOLOGY | Facility: CLINIC | Age: 60
Discharge: HOME | End: 2020-10-06
Attending: INTERNAL MEDICINE | Admitting: INTERNAL MEDICINE
Payer: COMMERCIAL

## 2020-10-06 DIAGNOSIS — I48.19 PERSISTENT ATRIAL FIBRILLATION (H): ICD-10-CM

## 2020-10-06 PROCEDURE — 93306 TTE W/DOPPLER COMPLETE: CPT | Mod: 26 | Performed by: INTERNAL MEDICINE

## 2020-10-06 PROCEDURE — 93306 TTE W/DOPPLER COMPLETE: CPT

## 2020-10-12 ENCOUNTER — OFFICE VISIT (OUTPATIENT)
Dept: CARDIOLOGY | Facility: CLINIC | Age: 60
End: 2020-10-12
Payer: COMMERCIAL

## 2020-10-12 VITALS
BODY MASS INDEX: 34.87 KG/M2 | HEART RATE: 80 BPM | OXYGEN SATURATION: 99 % | WEIGHT: 250 LBS | DIASTOLIC BLOOD PRESSURE: 74 MMHG | SYSTOLIC BLOOD PRESSURE: 147 MMHG

## 2020-10-12 DIAGNOSIS — I48.19 PERSISTENT ATRIAL FIBRILLATION (H): Primary | ICD-10-CM

## 2020-10-12 PROCEDURE — 99214 OFFICE O/P EST MOD 30 MIN: CPT | Mod: GC | Performed by: INTERNAL MEDICINE

## 2020-10-12 RX ORDER — DABIGATRAN ETEXILATE 150 MG/1
150 CAPSULE ORAL 2 TIMES DAILY
Qty: 60 CAPSULE | Refills: 0 | Status: SHIPPED | OUTPATIENT
Start: 2020-10-12 | End: 2021-03-03

## 2020-10-12 NOTE — PATIENT INSTRUCTIONS
Thank you for coming to the [facility] @ [place]; please note the following instructions:    You are scheduled for an Atrial Fibrillation Ablation, at [facility] [place], with Dr. Clement. The hospital is located at [address] [place] [address] on the [place] of the [place].     **Medication Change: 1 week prior to ablation, stop Xarelto and start Pradaxa 150mg twice a day. You will be given a 30 day supply. Continue taking until you run out, and then switch back to Xarelto. A 30 day voucher will be called into your pharmacy.    You will need to undergo a COVID-19 PCR swab test within 4 days of procedure. You will receive a phone call with more information. If you do not hear from the [facility] scheduling team, please call: [phone] to schedule.    Pre-Anesthesia Phone Call will occur 1-2 days prior to procedure date.  You do not need to come to the [place].    Please disregard any automated, reminder phone calls regarding arrival times. Follow the below arrival times.       Date:   DOT (transesophageal echocardiogram), CT Angiogram  _______: Arrive to the [facility] Waiting Room for your: CT & DOT with INR check if on warfarin.      -Only if you take warfarin: If your INR is too high or too low, your procedure may be canceled, or you may be required to stay overnight in the hospital.    -If there is evidence of a clot in your heart on the DOT,  the procedure will be canceled.     DOT Instructions:  1. Nothing to eat or drink 6 hours prior.  2. No additional medication instructions.  3. You will receive sedation for the DOT so you will need someone to drive you home from the hospital and stay with you for 6 hours. Do not make any legal decisions for 24 hours.     CT Angiogram Instructions:  1. Nothing to eat or drink except water 3 hours prior.  2. Avoid caffeine, smoking, or strenuous activity on the day of the test.  3. You will receive contrast, so plan to drink extra  water the day before and after your test.  4. If you take any of the following medications, please HOLD the day of:   * Metformin or Glucophage the morning of and wait 48 hours before re-starting   * Diuretic the day of. (Examples: Furosemide (Lasix), Torsemide, Bumetanide (Bumex), Metolazone (Zaroxolyn) and Hydrochlorothiazide, Spironolactone)   * NSAIDS (Examples: ibuprofen/Advil/Motrin, naproxen/Aleve) the day of.   * Erectile dysfunction medications (Examples: Viagra, Cialis, Levitra).  5. Please allow 1 hour of time for preparation and testing. You are on the table for ~15 minutes. Your feet go in first, your head is not enclosed.      Date:   Time: ________________to [address] at the [facility]  Atrial Fibrillation Ablation Procedure     1. Please review the attached instructions on showering before your procedure at the end of this letter.  2. Your history and physical will be completed by our nurse practitioner when you arrive.  3. Please do not eat anything for 8 hours prior to your procedure. You may have sips of water up until 2 hours prior to your arrival.  4. The morning of your procedure, you may take your scheduled medications with a sip of water - continue your blood thinner ( Pradaxa). If you take Pradaxa, please take your AM dose before you come to the hospital.  5. If the imaging shows a clot in your heart, the procedure will be canceled.   6. You will receive general anesthesia for this procedure.   7. You will likely discharge the same day and need a .      Post-Procedure Instructions  Care of groin site:    Remove the Band-Aid after 24 hours. If there is minor oozing, apply another Band-aid and remove it after 12 hours.     Do NOT take a bath, use a hot tub, pool, or submerse in water for at least 3 days. You may shower.     It is normal to have a small bruise or lump at the site.    Do not scrub the site.    Do not use lotion or powder near the puncture site for 3 days.    If you start  bleeding from the site in your groin: Lie down flat and press firmly on the site. Call your physician immediately, or, come to the emergency room.  Call 911 right away if you have bleeding that is heavy or does not stop.    Call your doctor/provider if:     You have a large or growing hard lump around the site.     The site is red, swollen, hot or tender.     Blood or fluid is draining from the site.     You have chills or a fever greater than 101 F (38 C).     Your leg or arm turns bluish, feels numb or cool.     You have hives, a rash or unusual itching.      Activity Restrictions    For the first 2 days: Do not stoop or squat. When you cough, sneeze or move your bowels, hold your hand over the puncture site and press gently.    Do not lift more than 10 pounds or exertional activity for 10 days.  - No driving for 24 hours after (with or without general anesthesia).     Date: ______ 14 day Ziopatch hookup  Date: _______ Follow up with Dr. Clement.       Please do not hesitate to utilize [employer] or call us if you have any questions or concerns.      Showering Before Surgery   Your surgeon has asked you to take 2 showers before surgery.  Why is this important?  It is normal for bacteria (germs) to be on your skin. The skin protects us from these germs. When you have surgery, we cut the skin. Sometimes germs get into the cuts and cause infection (illness caused by germs). By following the instructions below and using special soap, you will lower the number of germs on your skin. This decreases your chance of infection.  Special soap  Buy or get 8 ounces of antiseptic surgical soap called 4% CHG. Common name brands of this soap are Hibiclens and Exidine.   You can find it at your local pharmacy, clinic or retail store. If you have trouble, ask your pharmacist to help you find the right substitute.   A note about shaving:  Do not shave within 12 inches of your incision (surgical cut) area for at least 3 days before  surgery. Shaving can make small cuts in the skin. This puts you at a higher risk of infection.  Items you will need for each shower:    1 newly washed towel    4 ounces of one of the above soaps  Follow these instructions:  The evening before surgery   1. Wash your hair and body with your regular shampoo and soap. Make sure you rinse the shampoo and soap from your hair and body.   2. Using clean hands, apply about 2 ounces of soap gently on your skin from the neck to your toes. Use on your groin area last. Do not use this soap on your face or head. If you get any soap in your eyes, ears or mouth, rinse right away.   3. Repeat step 2. It is very important to let the soap stay on your skin for at least 1 minute.   4. Rinse well and dry off using a clean towel.If you feel any tingling, itching or other irritation, rinse right away. It is normal to feel some coolness on the skin after using the antiseptic soap. Your skin may feel a bit dry after the shower, but do not use any lotions, creams or moisturizers. Do not use hair spray or other products in your hair.  5. Dress in freshly washed clothes or pajamas. Use fresh pillowcases and sheets on your bed.    The morning of surgery  1. Wash your hair and body with your regular shampoo and soap. Make sure you rinse the shampoo and soap from your hair and body.   2. Using clean hands, apply about 2 ounces of soap gently on your skin from the neck to your toes. Use on your groin area last. Do not use this soap on your face or head. If you get any soap in your eyes, ears or mouth, rinse right away.   3. Repeat step 2. It is very important to let the soap stay on your skin for at least 1 minute.   4. Rinse well and dry off using a clean towel.If you feel any tingling, itching or other irritation, rinse right away. It is normal to feel some coolness on the skin after using the antiseptic soap. Your skin may feel a bit dry after the shower, but do not use any lotions, creams or  moisturizers. Do not use hair spray or other products in your hair.  5. Dress in clean clothes.  If you have any questions about showering or an allergy to CHG soap, please call the [facility] Nursing Department at the hospital where you are having your surgery.  [facility] [place] ([place]): [phone]  This phone number will be answered between the hours of 8:00 a.m. and 6:30 p.m. Monday through Friday.                  If you have any questions regarding your visit please contact your care team:     Cardiology  Telephone Number   Beronica HINKLE, RN  Delisa TIRADO, RN   Lyric PARRISH, RMA  Yoselin SOLIS, RMKATIA TRAN, LPN   [phone] (option 1)   For scheduling appts:     [phone] (select option 1)       For the Device Clinic (Pacemakers and ICD's)  RN's :  Arleth Scanlon   During business hours: [phone]    *After business hours:  [phone] (select option 4)      Normal test result notifications will be released via SplitGigs or mailed within 7 business days.  All other test results, will be communicated via telephone once reviewed by your cardiologist.    If you need a medication refill please contact your pharmacy.  Please allow 3 business days for your refill to be completed.    As always, thank you for trusting us with your health care needs!

## 2020-10-12 NOTE — PROGRESS NOTES
Electrophysiology Clinic Visit:    HPI: Purpose of visit: Follow-up of atrial fibrillation    HISTORY OF PRESENT ILLNESS:  Mr. Augusto Meeks is a 59-year-old gentleman with a medical history significant for hypertension, obesity and persistent atrial fibrillation.  The patient does not have any history of diabetes, coronary artery disease, previous strokes, TIAs or heart failure.      The patient's last follow up with us was a virtual visit on 10/5/2020.  During that visit, the patient reported exertional dyspnea when he climbs 1-2 flights of stairs.  He denied palpitations, irregular heartbeat sensation, frequent lightheadedness, presyncope or syncope. He's currently in  atrial fibrillation.      The patient has undergone multiple cardioversions in the past (Sept 2019, October 2019, December 2019) . Immediately after the cardioversion, the patient feels symptomatically better.  He reports absence of fluttering sensation in her chest and feeling overall better. Each time he's only able to maintain sinus rhythm for a few days.  He was also started on Flecainide briefly last year, however, this was stopped in January 2020 due to persistence of Afib.    The patient is on Xarelto 20 mg once daily for stroke prophylaxis.     TTE 10/6/2020 revealed normal global and regional left ventricular function with an EF of 60-65%. Right ventricular function, chamber size, wall motion, and thickness normal. No significant valvular dysfunction present.    PAST MEDICAL HISTORY:  Past Medical History:   Diagnosis Date     A-fib (H) 2019       CURRENT MEDICATIONS:  Current Outpatient Medications   Medication Sig Dispense Refill     amLODIPine (NORVASC) 5 MG tablet Take 1 tablet (5 mg) by mouth daily 90 tablet 3     metoprolol succinate ER (TOPROL-XL) 50 MG 24 hr tablet Take 1 tablet (50 mg) by mouth daily 90 tablet 1     rivaroxaban ANTICOAGULANT (XARELTO) 20 MG TABS tablet Take 1 tablet (20 mg) by mouth daily (with dinner) 90 tablet 3        PAST SURGICAL HISTORY:  Past Surgical History:   Procedure Laterality Date     ANESTHESIA CARDIOVERSION N/A 9/25/2019    Procedure: Anesthesia Coverage Transesophageal Echocardiogram, Cardioversion @0930;  Surgeon: GENERIC ANESTHESIA PROVIDER;  Location: [facility]     ANESTHESIA CARDIOVERSION N/A 10/30/2019    Procedure: ANESTHESIA, FOR CARDIOVERSION @1100;  Surgeon: GENERIC ANESTHESIA PROVIDER;  Location: [facility]     ANESTHESIA CARDIOVERSION N/A 12/5/2019    Procedure: CARDIOVERSION;  Surgeon: GENERIC ANESTHESIA PROVIDER;  Location: [facility] OR     CARDIOVERSION  10/30/2019    Patient reported he had a cardioverson on 09/25/2019.     VASECTOMY         ALLERGIES:     Allergies   Allergen Reactions     Lisinopril Cough       FAMILY HISTORY:  - Premature coronary artery disease  - Atrial fibrillation  - Sudden cardiac death     SOCIAL HISTORY:  Social History     Tobacco Use     Smoking status: Current Some Day Smoker     Years: 20.00     Types: Cigars     Smokeless tobacco: Never Used     Tobacco comment: occasional cigar   Substance Use Topics     Alcohol use: Yes     Drug use: Yes     Types: Marijuana       ROS:   Constitutional: No fever, chills, or sweats. Weight stable.   ENT: No visual disturbance, ear ache, epistaxis, sore throat.   Cardiovascular: As per HPI.   Respiratory: No cough, hemoptysis.    GI: No nausea, vomiting, hematemesis, melena, or hematochezia.   : No hematuria.   Integument: Negative.   Psychiatric: Negative.   Hematologic:  Easy bruising, no easy bleeding.  Neuro: Negative.   Endocrinology: No significant heat or cold intolerance   Musculoskeletal: No myalgia.    Exam:  BP (!) 147/74 (BP Location: Left arm, Patient Position: Chair, Cuff Size: Adult Large)   Pulse 80   Wt 113.4 kg (250 lb)   SpO2 99%   BMI 34.87 kg/m    GENERAL APPEARANCE: healthy, alert and no distress      Labs:  CBC RESULTS:   No results found for: WBC, RBC, HGB, HCT, MCV, MCH, MCHC, RDW, PLT    BMP RESULTS:  Lab Results    Component Value Date     11/15/2019    POTASSIUM 4.2 12/05/2019    CHLORIDE 104 11/15/2019    CO2 28 11/15/2019    ANIONGAP 6 11/15/2019     (H) 11/15/2019    BUN 16 11/15/2019    CR 0.89 11/15/2019    GFRESTIMATED >90 11/15/2019    GFRESTBLACK >90 11/15/2019    CHALINO 9.2 11/15/2019        INR RESULTS:  Lab Results   Component Value Date    INR 1.39 (H) 09/25/2019       Procedures:      Assessment and Plan:     # Persistent atrial fibrillation- with exertional dyspnea on mild exertion  # Hypertension    During the visit, we discussed the aims, risks, and alternatives of different approaches to rhythm control strategy.  We explained that the options for a rhythm control strategy include using a different antiarrhythmic medication such as dronedarone vs catheter ablation.    We explained to patient that risks of EP study and ablation procedure include, but are not limited to vascular injury, excessive bleeding requiring blood transfusion, AV node injury requiring permanent pacemaker implantation, cardiac tamponade requiring pericardiocentesis or open heart surgery, TIA, stroke, or death. Patient understood the risks and decided to proceed with procedure. Patient to undergo DOT, CTA chest the day before procedure. As for AC, he will switch Xarelto to Pradaxa one week before procedure.       All questions and concerns were addressed and patient is happy with the plan.  Case discussed with Dr Clement who's in agreement.    Jo Esquivel MD,   PGY-4 Cardiology Fellow  Pager [phone]        I discussed the aims, risks, and alternatives to antiarrhythmic drug versus catheter ablation to maintain sinus rhythm.  Patient understood the discussion and would like to consider further and will inform us when he decides to proceed with catheter ablation.        I have seen, interviewed, and examined patient. I have reviewed the laboratory tests, imaging, and other investigations. I have reviewed the management plan  with the patient. I discussed with the team and agree with the findings and plan in this resident/fellow/nurse practitioner's note. In addition, changes in the physical examination, assessment and plan have been incorporated into the note by myself, as to make it a single cohesive document.       Vanessa Clement MD, MS  Cardiology/Cardiac EP Attending Staff

## 2020-10-12 NOTE — NURSING NOTE
"Chief Complaint   Patient presents with     RECHECK     RETURN 10/12/20 Dr. Clement discuss echo results and afib ablation. Pt reports some CADE and palpitations.       Initial BP (!) 147/74 (BP Location: Left arm, Patient Position: Chair, Cuff Size: Adult Large)   Pulse 80   Wt 113.4 kg (250 lb)   SpO2 99%   BMI 34.87 kg/m   Estimated body mass index is 34.87 kg/m  as calculated from the following:    Height as of 11/15/19: 1.803 m (5' 11\").    Weight as of this encounter: 113.4 kg (250 lb)..  BP completed using cuff size: bravo Hough MA  "

## 2020-10-12 NOTE — LETTER
10/12/2020      RE: Augusto Meeks  [address] [facility] [address]       Dear Colleague,    Thank you for the opportunity to participate in the care of your patient, Augusto Meeks, at the [facility]. Please see a copy of my visit note below.    Electrophysiology Clinic Visit:    HPI: Purpose of visit: Follow-up of atrial fibrillation    HISTORY OF PRESENT ILLNESS:  Mr. Augusto Meeks is a 59-year-old gentleman with a medical history significant for hypertension, obesity and persistent atrial fibrillation.  The patient does not have any history of diabetes, coronary artery disease, previous strokes, TIAs or heart failure.      The patient's last follow up with us was a virtual visit on 10/5/2020.  During that visit, the patient reported exertional dyspnea when he climbs 1-2 flights of stairs.  He denied palpitations, irregular heartbeat sensation, frequent lightheadedness, presyncope or syncope. He's currently in  atrial fibrillation.      The patient has undergone multiple cardioversions in the past (Sept 2019, October 2019, December 2019) . Immediately after the cardioversion, the patient feels symptomatically better.  He reports absence of fluttering sensation in her chest and feeling overall better. Each time he's only able to maintain sinus rhythm for a few days.  He was also started on Flecainide briefly last year, however, this was stopped in January 2020 due to persistence of Afib.    The patient is on Xarelto 20 mg once daily for stroke prophylaxis.     TTE 10/6/2020 revealed normal global and regional left ventricular function with an EF of 60-65%. Right ventricular function, chamber size, wall motion, and thickness normal. No significant valvular dysfunction present.    PAST MEDICAL HISTORY:  Past Medical History:   Diagnosis Date     A-fib (H) 2019       CURRENT MEDICATIONS:  Current Outpatient Medications    Medication Sig Dispense Refill     amLODIPine (NORVASC) 5 MG tablet Take 1 tablet (5 mg) by mouth daily 90 tablet 3     metoprolol succinate ER (TOPROL-XL) 50 MG 24 hr tablet Take 1 tablet (50 mg) by mouth daily 90 tablet 1     rivaroxaban ANTICOAGULANT (XARELTO) 20 MG TABS tablet Take 1 tablet (20 mg) by mouth daily (with dinner) 90 tablet 3       PAST SURGICAL HISTORY:  Past Surgical History:   Procedure Laterality Date     ANESTHESIA CARDIOVERSION N/A 9/25/2019    Procedure: Anesthesia Coverage Transesophageal Echocardiogram, Cardioversion @0930;  Surgeon: GENERIC ANESTHESIA PROVIDER;  Location: [facility]     ANESTHESIA CARDIOVERSION N/A 10/30/2019    Procedure: ANESTHESIA, FOR CARDIOVERSION @1100;  Surgeon: GENERIC ANESTHESIA PROVIDER;  Location: [facility]     ANESTHESIA CARDIOVERSION N/A 12/5/2019    Procedure: CARDIOVERSION;  Surgeon: GENERIC ANESTHESIA PROVIDER;  Location: [facility]     CARDIOVERSION  10/30/2019    Patient reported he had a cardioverson on 09/25/2019.     VASECTOMY         ALLERGIES:     Allergies   Allergen Reactions     Lisinopril Cough       FAMILY HISTORY:  - Premature coronary artery disease  - Atrial fibrillation  - Sudden cardiac death     SOCIAL HISTORY:  Social History     Tobacco Use     Smoking status: Current Some Day Smoker     Years: 20.00     Types: Cigars     Smokeless tobacco: Never Used     Tobacco comment: occasional cigar   Substance Use Topics     Alcohol use: Yes     Drug use: Yes     Types: Marijuana       ROS:   Constitutional: No fever, chills, or sweats. Weight stable.   ENT: No visual disturbance, ear ache, epistaxis, sore throat.   Cardiovascular: As per HPI.   Respiratory: No cough, hemoptysis.    GI: No nausea, vomiting, hematemesis, melena, or hematochezia.   : No hematuria.   Integument: Negative.   Psychiatric: Negative.   Hematologic:  Easy bruising, no easy bleeding.  Neuro: Negative.   Endocrinology: No significant heat or cold intolerance   Musculoskeletal: No  myalgia.    Exam:  BP (!) 147/74 (BP Location: Left arm, Patient Position: Chair, Cuff Size: Adult Large)   Pulse 80   Wt 113.4 kg (250 lb)   SpO2 99%   BMI 34.87 kg/m    GENERAL APPEARANCE: healthy, alert and no distress      Labs:  CBC RESULTS:   No results found for: WBC, RBC, HGB, HCT, MCV, MCH, MCHC, RDW, PLT    BMP RESULTS:  Lab Results   Component Value Date     11/15/2019    POTASSIUM 4.2 12/05/2019    CHLORIDE 104 11/15/2019    CO2 28 11/15/2019    ANIONGAP 6 11/15/2019     (H) 11/15/2019    BUN 16 11/15/2019    CR 0.89 11/15/2019    GFRESTIMATED >90 11/15/2019    GFRESTBLACK >90 11/15/2019    CHALINO 9.2 11/15/2019        INR RESULTS:  Lab Results   Component Value Date    INR 1.39 (H) 09/25/2019       Procedures:      Assessment and Plan:     # Persistent atrial fibrillation- with exertional dyspnea on mild exertion  # Hypertension    During the visit, we discussed the aims, risks, and alternatives of different approaches to rhythm control strategy.  We explained that the options for a rhythm control strategy include using a different antiarrhythmic medication such as dronedarone vs catheter ablation.    We explained to patient that risks of EP study and ablation procedure include, but are not limited to vascular injury, excessive bleeding requiring blood transfusion, AV node injury requiring permanent pacemaker implantation, cardiac tamponade requiring pericardiocentesis or open heart surgery, TIA, stroke, or death. Patient understood the risks and decided to proceed with procedure. Patient to undergo DOT, CTA chest the day before procedure. As for AC, he will switch Xarelto to Pradaxa one week before procedure.       All questions and concerns were addressed and patient is happy with the plan.  Case discussed with Dr Clement who's in agreement.    Jo Esquivel MD,   PGY-4 Cardiology Fellow  Pager [phone]        I discussed the aims, risks, and alternatives to antiarrhythmic drug versus  catheter ablation to maintain sinus rhythm.  Patient understood the discussion and would like to consider further and will inform us when he decides to proceed with catheter ablation.        I have seen, interviewed, and examined patient. I have reviewed the laboratory tests, imaging, and other investigations. I have reviewed the management plan with the patient. I discussed with the team and agree with the findings and plan in this resident/fellow/nurse practitioner's note. In addition, changes in the physical examination, assessment and plan have been incorporated into the note by myself, as to make it a single cohesive document.       Vanessa Clement MD, MS  Cardiology/Cardiac EP Attending Staff      Please do not hesitate to contact me if you have any questions/concerns.     Sincerely,     Vanessa Clement MD

## 2020-10-13 ENCOUNTER — TELEPHONE (OUTPATIENT)
Dept: CARDIOLOGY | Facility: CLINIC | Age: 60
End: 2020-10-13

## 2020-10-13 DIAGNOSIS — I48.19 PERSISTENT ATRIAL FIBRILLATION (H): ICD-10-CM

## 2020-10-13 NOTE — TELEPHONE ENCOUNTER
Dabigatran ANTICOAGULANT (PRADAXA) 150 MG capsule not covered by insurance. Please send alternative or start prior authorization.

## 2020-10-20 PROBLEM — I48.19 PERSISTENT ATRIAL FIBRILLATION (H): Status: ACTIVE | Noted: 2020-10-20

## 2020-10-20 RX ORDER — LIDOCAINE 40 MG/G
CREAM TOPICAL
Status: CANCELLED | OUTPATIENT
Start: 2020-10-20

## 2020-10-22 ENCOUNTER — TELEPHONE (OUTPATIENT)
Dept: CARDIOLOGY | Facility: CLINIC | Age: 60
End: 2020-10-22

## 2020-10-22 NOTE — TELEPHONE ENCOUNTER
Patient has not scheduled his ablation at this time.  When he schedules the ablation, [place] will be addresssed.  Beronica Montoya RN

## 2020-10-22 NOTE — TELEPHONE ENCOUNTER
EP Scheduling attempted another call to the patient to schedule Ablation with Dr. Clement. The number [phone] was left for the patient to return the call and schedule the procedure.  The phone didn't have an ability to leave a message on 10/20 so, I did a [employer] message and he did read that on the same day.     He is aware of how to reach us - I will stop calling and allow him to call me when he desires this procedure.     Silvia Molina  Periop Electrophysiology   [phone]

## 2020-10-27 NOTE — TELEPHONE ENCOUNTER
Another message left for the patient asking him to call and schedule his ablation    Silvia Molina  Periop Electrophysiology   [phone]

## 2020-10-30 ENCOUNTER — TELEPHONE (OUTPATIENT)
Dept: CARDIOLOGY | Facility: CLINIC | Age: 60
End: 2020-10-30

## 2020-10-30 NOTE — TELEPHONE ENCOUNTER
Left message to return clinic call to .  Kacie Nieves L.P.N.    Tamia Holland MD.,Cardiology   jan . Visit due noted in work que . .Kacie Nieves L.P.N.,[facility]. [facility].

## 2020-11-03 ENCOUNTER — VIRTUAL VISIT (OUTPATIENT)
Dept: FAMILY MEDICINE | Facility: OTHER | Age: 60
End: 2020-11-03
Payer: COMMERCIAL

## 2020-11-03 DIAGNOSIS — Z20.822 SUSPECTED COVID-19 VIRUS INFECTION: Primary | ICD-10-CM

## 2020-11-03 PROCEDURE — 99421 OL DIG E/M SVC 5-10 MIN: CPT | Performed by: PHYSICIAN ASSISTANT

## 2020-11-03 NOTE — PROGRESS NOTES
"Date: 2020 14:30:29  Clinician: Des Najera  Clinician NPI: [id number]  Patient: Chandu Meeks  Patient : 1960  Patient Address: [address] [place] [address]  Patient Phone: [phone]  Visit Protocol: URI  Patient Summary:  Chandu is a 59 year old ( : 1960 ) male who initiated a [facility] Visit for COVID-19 (Coronavirus) evaluation and screening. When asked the question \"Please sign me up to receive news, health information and promotions from [facility].\", Chandu responded \"No\".    Chandu states his symptoms started 1-2 days ago.   His symptoms consist of myalgia, chills, malaise, diarrhea, a headache, and a cough. Chandu also feels feverish but was unable to measure his temperature.   Symptom details     Cough: Chandu coughs a few times an hour and his cough is not more bothersome at night. Phlegm does not come into his throat when he coughs. He does not believe his cough is caused by post-nasal drip.     Headache: He states the headache is mild (1-3 on a 10 point pain scale).      Chandu denies having vomiting, rhinitis, facial pain or pressure, sore throat, teeth pain, ageusia, ear pain, wheezing, nasal congestion, nausea, and anosmia. He also denies taking antibiotic medication in the past month, having recent facial or sinus surgery in the past 60 days, and having a sinus infection within the past year. He is not experiencing dyspnea.   Precipitating events  He has not recently been exposed to someone with influenza. Chandu has been in close contact with the following high risk individuals: children under the age of 5.   Pertinent COVID-19 (Coronavirus) information  Chandu does not work or volunteer as healthcare worker or a . In the past 14 days, Chandu has not worked or volunteered at a healthcare facility or group living setting.   In the past 14 days, he also has not lived in a congregate living setting.   Chandu has not had a close contact with a laboratory-confirmed COVID-19 patient within 14 " days of symptom onset.    Since December 2019, Chandu has not been tested for COVID-19 and has not had upper respiratory infection or influenza-like illness.   Pertinent medical history  Chandu does not need a return to work/school note.   Weight: 245 lbs   Chandu does not smoke or use smokeless tobacco.   Additional information as reported by the patient (free text): Fell tired, aches and pains, headache, diarreha. not sure if I have a temp   Weight: 245 lbs    MEDICATIONS: Toprol XL oral, Xarelto oral, Norvasc oral, ALLERGIES: NKDA  Clinician Response:  Dear Chandu,   Your symptoms show that you may have coronavirus (COVID-19). This illness can cause fever, cough and trouble breathing. Many people get a mild case and get better on their own. Some people can get very sick.  What should I do?  We would like to test you for this virus.   1. Please call [phone] to schedule your visit. Explain that you were referred by [facility] to have a COVID-19 test. [facility].  The following will serve as your written order for this COVID Test, ordered by me, for the indication of suspected COVID [Z20.828]: The test will be ordered in [employer], our electronic health record, after you are scheduled. It will show as ordered and authorized by Manuelito Dumont MD.  Order: COVID-19 (Coronavirus) PCR for SYMPTOMATIC testing from [facility].   2. When it's time for your COVID test:  Stay at least 6 feet away from others. (If someone will drive you to your test, stay in the backseat, as far away from the  as you can.)   Cover your mouth and nose with a mask, tissue or washcloth.  Go straight to the testing site. Don't make any stops on the way there or back.      3.Starting now: Stay home and away from others (self-isolate) until:   You've had no fever---and no medicine that reduces fever---for one full day (24 hours). And...   Your other symptoms have gotten better. For example, your cough or breathing has improved.  "And...   At least 10 days have passed since your symptoms started.       During this time, don't leave the house except for testing or medical care.   Stay in your own room, even for meals. Use your own bathroom if you can.   Stay away from others in your home. No hugging, kissing or shaking hands. No visitors.  Don't go to work, school or anywhere else.    Clean \"high touch\" surfaces often (doorknobs, counters, handles, etc.). Use a household cleaning spray or wipes. You'll find a full list of  on the EPA website: www.epa.gov/pesticide-registration/list-n-disinfectants-use-against-sars-cov-2.   Cover your mouth and nose with a mask, tissue or washcloth to avoid spreading germs.  Wash your hands and face often. Use soap and water.  Caregivers in these groups are at risk for severe illness due to COVID-19:  o People 65 years and older  o People who live in a nursing home or long-term care facility  o People with chronic disease (lung, heart, cancer, diabetes, kidney, liver, immunologic)  o People who have a weakened immune system, including those who:   Are in cancer treatment  Take medicine that weakens the immune system, such as corticosteroids  Had a bone marrow or organ transplant  Have an immune deficiency  Have poorly controlled HIV or AIDS  Are obese (body mass index of 40 or higher)  Smoke regularly   o Caregivers should wear gloves while washing dishes, handling laundry and cleaning bedrooms and bathrooms.  o Use caution when washing and drying laundry: Don't shake dirty laundry, and use the warmest water setting that you can.  o For more tips, go to www.cdc.gov/coronavirus/2019-ncov/downloads/10Things.pdf.    How can I take care of myself?    Get lots of rest. Drink extra fluids (unless a doctor has told you not to).   Take Tylenol (acetaminophen) for fever or pain. If you have liver or kidney problems, ask your family doctor if it's okay to take Tylenol.   Adults can take either:    650 mg (two 325 " mg pills) every 4 to 6 hours, or...   1,000 mg (two 500 mg pills) every 8 hours as needed.    Note: Don't take more than 3,000 mg in one day. Acetaminophen is found in many medicines (both prescribed and over-the-counter medicines). Read all labels to be sure you don't take too much.   For children, check the Tylenol bottle for the right dose. The dose is based on the child's age or weight.    If you have other health problems (like cancer, heart failure, an organ transplant or severe kidney disease): Call your specialty clinic if you don't feel better in the next 2 days.       Know when to call 911. Emergency warning signs include:    Trouble breathing or shortness of breath Pain or pressure in the chest that doesn't go away Feeling confused like you haven't felt before, or not being able to wake up Bluish-colored lips or face.  Where can I get more information?   [facility] -- About COVID-19: [email or web address]   CDC -- What to Do If You're Sick: www.cdc.gov/coronavirus/2019-ncov/about/steps-when-sick.html   CDC -- Ending Home Isolation: www.cdc.gov/coronavirus/2019-ncov/hcp/disposition-in-home-patients.html   CDC -- Caring for Someone: www.cdc.gov/coronavirus/2019-ncov/if-you-are-sick/care-for-someone.html   [facility] -- Interim Guidance for Hospital Discharge to Home: www.health.[facility].mn.us/diseases/coronavirus/hcp/hospdischarge.pdf   [facility] clinical trials (COVID-19 research studies): clinicalaffairs.[facility]-clinical-trials    Below are the COVID-19 hotlines at the [facility] ([facility]). Interpreters are available.    For health questions: Call 542-644-8379 or 1-648.817.6171 (7 a.m. to 7 p.m.) For questions about schools and childcare: Call 656-258-6087 or 1-932.430.7456 (7 a.m. to 7 p.m.)    Diagnosis: Contact with and (suspected) exposure to other viral communicable diseases  Diagnosis ICD: Z20.828

## 2020-11-04 DIAGNOSIS — Z20.822 SUSPECTED COVID-19 VIRUS INFECTION: ICD-10-CM

## 2020-11-04 PROCEDURE — U0003 INFECTIOUS AGENT DETECTION BY NUCLEIC ACID (DNA OR RNA); SEVERE ACUTE RESPIRATORY SYNDROME CORONAVIRUS 2 (SARS-COV-2) (CORONAVIRUS DISEASE [COVID-19]), AMPLIFIED PROBE TECHNIQUE, MAKING USE OF HIGH THROUGHPUT TECHNOLOGIES AS DESCRIBED BY CMS-2020-01-R: HCPCS | Performed by: PHYSICIAN ASSISTANT

## 2020-11-05 LAB
SARS-COV-2 RNA SPEC QL NAA+PROBE: ABNORMAL
SPECIMEN SOURCE: ABNORMAL

## 2020-11-15 ENCOUNTER — MYC REFILL (OUTPATIENT)
Dept: CARDIOLOGY | Facility: CLINIC | Age: 60
End: 2020-11-15

## 2020-11-15 DIAGNOSIS — I10 BENIGN ESSENTIAL HYPERTENSION: ICD-10-CM

## 2020-11-16 RX ORDER — AMLODIPINE BESYLATE 5 MG/1
5 TABLET ORAL DAILY
Qty: 90 TABLET | Refills: 0 | Status: SHIPPED | OUTPATIENT
Start: 2020-11-16 | End: 2021-05-16

## 2020-12-10 ENCOUNTER — MYC MEDICAL ADVICE (OUTPATIENT)
Dept: FAMILY MEDICINE | Facility: CLINIC | Age: 60
End: 2020-12-10

## 2020-12-10 DIAGNOSIS — I10 BENIGN ESSENTIAL HYPERTENSION: ICD-10-CM

## 2020-12-10 RX ORDER — METOPROLOL SUCCINATE 50 MG/1
50 TABLET, EXTENDED RELEASE ORAL DAILY
Qty: 90 TABLET | Refills: 1 | Status: SHIPPED | OUTPATIENT
Start: 2020-12-10 | End: 2021-04-07

## 2020-12-10 NOTE — TELEPHONE ENCOUNTER
Routing refill request to provider for review/approval because:  BP Readings from Last 3 Encounters:   10/12/20 (!) 147/74   01/22/20 127/78   12/05/19 123/79         Rhoda JOHNN, RN, CPN

## 2020-12-13 ENCOUNTER — HEALTH MAINTENANCE LETTER (OUTPATIENT)
Age: 60
End: 2020-12-13

## 2020-12-14 ENCOUNTER — MYC MEDICAL ADVICE (OUTPATIENT)
Dept: CARDIOLOGY | Facility: CLINIC | Age: 60
End: 2020-12-14

## 2020-12-15 DIAGNOSIS — Z11.59 ENCOUNTER FOR SCREENING FOR OTHER VIRAL DISEASES: Primary | ICD-10-CM

## 2021-01-09 DIAGNOSIS — I48.19 PERSISTENT ATRIAL FIBRILLATION (H): ICD-10-CM

## 2021-01-09 DIAGNOSIS — R51.9 HEADACHE: ICD-10-CM

## 2021-01-09 DIAGNOSIS — R53.83 FATIGUE: Primary | ICD-10-CM

## 2021-01-09 RX ORDER — DABIGATRAN ETEXILATE 150 MG/1
150 CAPSULE ORAL 2 TIMES DAILY
Qty: 60 CAPSULE | Refills: 0 | Status: CANCELLED | OUTPATIENT
Start: 2021-01-09

## 2021-01-12 NOTE — TELEPHONE ENCOUNTER
Spoke to Chandu who reports that he does not need a refill of dabigatran.  He is taking prior to ablation.  Used a 1 month voucher.  pradaxa is not covered by his insurance so he will switch over to Eliquis after he finishes his supply of dabigatran.      He also reports that he was told he did not need a covid test since he was positive for covid within 3 months.  Patient also complaining of continued fatigue and headaches.  He feels it is from COVID.  Writer offered post covid clinic referral and patient was very interested.  Referral placed.    Patient was also concerned of coverage for the upcoming tests and ablation.  Writer did give him the [employer] # [phone].    Beronica Montoya RN  Cardiology Care Coordinator  [facility]  [phone] option 1

## 2021-01-15 ENCOUNTER — HEALTH MAINTENANCE LETTER (OUTPATIENT)
Age: 61
End: 2021-01-15

## 2021-01-20 ENCOUNTER — VIRTUAL VISIT (OUTPATIENT)
Dept: PHYSICAL MEDICINE AND REHAB | Facility: CLINIC | Age: 61
End: 2021-01-20
Attending: INTERNAL MEDICINE
Payer: COMMERCIAL

## 2021-01-20 ENCOUNTER — TELEPHONE (OUTPATIENT)
Dept: PHYSICAL MEDICINE AND REHAB | Facility: CLINIC | Age: 61
End: 2021-01-20

## 2021-01-20 DIAGNOSIS — U07.1 COVID-19: ICD-10-CM

## 2021-01-20 DIAGNOSIS — R26.89 IMPAIRMENT OF BALANCE: Primary | ICD-10-CM

## 2021-01-20 PROBLEM — R53.83 FATIGUE: Status: ACTIVE | Noted: 2021-01-20

## 2021-01-20 PROCEDURE — 99207 PR NO CHARGE LOS: CPT | Performed by: PHYSICAL MEDICINE & REHABILITATION

## 2021-01-20 ASSESSMENT — ANXIETY QUESTIONNAIRES
4. TROUBLE RELAXING: NOT AT ALL
7. FEELING AFRAID AS IF SOMETHING AWFUL MIGHT HAPPEN: SEVERAL DAYS
6. BECOMING EASILY ANNOYED OR IRRITABLE: NOT AT ALL
GAD7 TOTAL SCORE: 2
1. FEELING NERVOUS, ANXIOUS, OR ON EDGE: SEVERAL DAYS
3. WORRYING TOO MUCH ABOUT DIFFERENT THINGS: NOT AT ALL
GAD7 TOTAL SCORE: 2
2. NOT BEING ABLE TO STOP OR CONTROL WORRYING: NOT AT ALL
5. BEING SO RESTLESS THAT IT IS HARD TO SIT STILL: NOT AT ALL
7. FEELING AFRAID AS IF SOMETHING AWFUL MIGHT HAPPEN: SEVERAL DAYS

## 2021-01-20 ASSESSMENT — PATIENT HEALTH QUESTIONNAIRE - PHQ9: SUM OF ALL RESPONSES TO PHQ QUESTIONS 1-9: 3

## 2021-01-20 NOTE — LETTER
1/20/2021       RE: Augusto Meeks  [address] [place] [address]     Dear Colleague,    Thank you for referring your patient, Augusto Meeks, to the [facility] [place] at [facility]. Please see a copy of my visit note below.    Chandu is a 60 year old who is being evaluated via a billable video visit.      How would you like to obtain your AVS? MyChart  If the video visit is dropped, the invitation should be resent by: Text to cell phone: [phone]  Will anyone else be joining your video visit? No      Video Start Time: 9:03 AM      Type of service:  Video Visit    Distant Location (provider location):  [facility] [place]       Post-COVID Clinic  Date of encounter: 01/20/21      Attempted several times both by phone, Amwel, and doximity to reach the patient, but the patient did not answer.   Asked my staff to reschedule the appointment     Answers for HPI/ROS submitted by the patient on 1/20/2021   If you checked off any problems, how difficult have these problems made it for you to do your work, take care of things at home, or get along with other people?: Somewhat difficult  PHQ9 TOTAL SCORE: 3  AMAURY 7 TOTAL SCORE: 2        Again, thank you for allowing me to participate in the care of your patient.      Sincerely,    Lacy New MD

## 2021-01-20 NOTE — PROGRESS NOTES
Chandu is a 60 year old who is being evaluated via a billable video visit.      How would you like to obtain your AVS? MyChart  If the video visit is dropped, the invitation should be resent by: Text to cell phone: [phone]  Will anyone else be joining your video visit? No      Video Start Time: 9:03 AM      Type of service:  Video Visit    Distant Location (provider location):  [facility] CL[facility]  Date of encounter: 01/20/21      Attempted several times both by phone, Amwel, and doximity to reach the patient, but the patient did not answer.   Asked my staff to reschedule the appointment     Answers for HPI/ROS submitted by the patient on 1/20/2021   If you checked off any problems, how difficult have these problems made it for you to do your work, take care of things at home, or get along with other people?: Somewhat difficult  PHQ9 TOTAL SCORE: 3  AMAURY 7 TOTAL SCORE: 2

## 2021-01-20 NOTE — TELEPHONE ENCOUNTER
Unable to connect with the patient during time of appointment for 1/20/21 with Dr. New.  left with phone number to offer to reschedule if interested.

## 2021-01-21 ENCOUNTER — HOSPITAL ENCOUNTER (OUTPATIENT)
Dept: CT IMAGING | Facility: CLINIC | Age: 61
End: 2021-01-21
Attending: INTERNAL MEDICINE
Payer: COMMERCIAL

## 2021-01-21 ENCOUNTER — TELEPHONE (OUTPATIENT)
Dept: CARDIOLOGY | Facility: CLINIC | Age: 61
End: 2021-01-21

## 2021-01-21 ENCOUNTER — ANESTHESIA EVENT (OUTPATIENT)
Dept: CARDIOLOGY | Facility: CLINIC | Age: 61
End: 2021-01-21
Payer: COMMERCIAL

## 2021-01-21 ENCOUNTER — HOSPITAL ENCOUNTER (OUTPATIENT)
Dept: CARDIOLOGY | Facility: CLINIC | Age: 61
End: 2021-01-21
Attending: INTERNAL MEDICINE
Payer: COMMERCIAL

## 2021-01-21 VITALS
OXYGEN SATURATION: 95 % | HEART RATE: 75 BPM | RESPIRATION RATE: 16 BRPM | DIASTOLIC BLOOD PRESSURE: 71 MMHG | SYSTOLIC BLOOD PRESSURE: 108 MMHG

## 2021-01-21 DIAGNOSIS — I48.19 PERSISTENT ATRIAL FIBRILLATION (H): ICD-10-CM

## 2021-01-21 LAB — RADIOLOGIST FLAGS: NORMAL

## 2021-01-21 PROCEDURE — 93320 DOPPLER ECHO COMPLETE: CPT | Mod: 26 | Performed by: INTERNAL MEDICINE

## 2021-01-21 PROCEDURE — 250N000011 HC RX IP 250 OP 636: Performed by: INTERNAL MEDICINE

## 2021-01-21 PROCEDURE — 75572 CT HRT W/3D IMAGE: CPT

## 2021-01-21 PROCEDURE — 93325 DOPPLER ECHO COLOR FLOW MAPG: CPT

## 2021-01-21 PROCEDURE — 250N000009 HC RX 250: Performed by: INTERNAL MEDICINE

## 2021-01-21 PROCEDURE — 93325 DOPPLER ECHO COLOR FLOW MAPG: CPT | Mod: 26 | Performed by: INTERNAL MEDICINE

## 2021-01-21 PROCEDURE — 75572 CT HRT W/3D IMAGE: CPT | Mod: 26 | Performed by: STUDENT IN AN ORGANIZED HEALTH CARE EDUCATION/TRAINING PROGRAM

## 2021-01-21 PROCEDURE — 250N000011 HC RX IP 250 OP 636: Performed by: STUDENT IN AN ORGANIZED HEALTH CARE EDUCATION/TRAINING PROGRAM

## 2021-01-21 PROCEDURE — 93312 ECHO TRANSESOPHAGEAL: CPT | Mod: 26 | Performed by: INTERNAL MEDICINE

## 2021-01-21 PROCEDURE — 99152 MOD SED SAME PHYS/QHP 5/>YRS: CPT | Mod: GC | Performed by: INTERNAL MEDICINE

## 2021-01-21 RX ORDER — SODIUM CHLORIDE 9 MG/ML
INJECTION, SOLUTION INTRAVENOUS CONTINUOUS PRN
Status: DISCONTINUED | OUTPATIENT
Start: 2021-01-21 | End: 2021-01-22 | Stop reason: HOSPADM

## 2021-01-21 RX ORDER — NALOXONE HYDROCHLORIDE 0.4 MG/ML
0.2 INJECTION, SOLUTION INTRAMUSCULAR; INTRAVENOUS; SUBCUTANEOUS
Status: DISCONTINUED | OUTPATIENT
Start: 2021-01-21 | End: 2021-01-22 | Stop reason: HOSPADM

## 2021-01-21 RX ORDER — LIDOCAINE HYDROCHLORIDE 20 MG/ML
15 SOLUTION OROPHARYNGEAL ONCE
Status: COMPLETED | OUTPATIENT
Start: 2021-01-21 | End: 2021-01-21

## 2021-01-21 RX ORDER — NALOXONE HYDROCHLORIDE 0.4 MG/ML
0.4 INJECTION, SOLUTION INTRAMUSCULAR; INTRAVENOUS; SUBCUTANEOUS
Status: DISCONTINUED | OUTPATIENT
Start: 2021-01-21 | End: 2021-01-22 | Stop reason: HOSPADM

## 2021-01-21 RX ORDER — ACYCLOVIR 200 MG/1
9.5 CAPSULE ORAL
Status: DISCONTINUED | OUTPATIENT
Start: 2021-01-21 | End: 2021-01-22 | Stop reason: HOSPADM

## 2021-01-21 RX ORDER — FENTANYL CITRATE 50 UG/ML
25 INJECTION, SOLUTION INTRAMUSCULAR; INTRAVENOUS
Status: DISCONTINUED | OUTPATIENT
Start: 2021-01-21 | End: 2021-01-22 | Stop reason: HOSPADM

## 2021-01-21 RX ORDER — LIDOCAINE 40 MG/G
CREAM TOPICAL
Status: DISCONTINUED | OUTPATIENT
Start: 2021-01-21 | End: 2021-01-22 | Stop reason: HOSPADM

## 2021-01-21 RX ORDER — IOPAMIDOL 755 MG/ML
120 INJECTION, SOLUTION INTRAVASCULAR ONCE
Status: COMPLETED | OUTPATIENT
Start: 2021-01-21 | End: 2021-01-21

## 2021-01-21 RX ORDER — FENTANYL CITRATE 50 UG/ML
50 INJECTION, SOLUTION INTRAMUSCULAR; INTRAVENOUS ONCE
Status: DISCONTINUED | OUTPATIENT
Start: 2021-01-21 | End: 2021-01-22 | Stop reason: HOSPADM

## 2021-01-21 RX ORDER — FLUMAZENIL 0.1 MG/ML
0.2 INJECTION, SOLUTION INTRAVENOUS
Status: DISCONTINUED | OUTPATIENT
Start: 2021-01-21 | End: 2021-01-22 | Stop reason: HOSPADM

## 2021-01-21 RX ADMIN — IOPAMIDOL 120 ML: 755 INJECTION, SOLUTION INTRAVENOUS at 10:04

## 2021-01-21 RX ADMIN — LIDOCAINE HYDROCHLORIDE 30 ML: 20 SOLUTION ORAL; TOPICAL at 10:52

## 2021-01-21 RX ADMIN — TOPICAL ANESTHETIC 0.5 ML: 200 SPRAY DENTAL; PERIODONTAL at 10:52

## 2021-01-21 RX ADMIN — MIDAZOLAM 1 MG: 1 INJECTION INTRAMUSCULAR; INTRAVENOUS at 11:08

## 2021-01-21 RX ADMIN — FENTANYL CITRATE 50 MCG: 50 INJECTION, SOLUTION INTRAMUSCULAR; INTRAVENOUS at 11:08

## 2021-01-21 RX ADMIN — FENTANYL CITRATE 50 MCG: 50 INJECTION, SOLUTION INTRAMUSCULAR; INTRAVENOUS at 11:05

## 2021-01-21 RX ADMIN — MIDAZOLAM 1 MG: 1 INJECTION INTRAMUSCULAR; INTRAVENOUS at 11:05

## 2021-01-21 ASSESSMENT — PATIENT HEALTH QUESTIONNAIRE - PHQ9
10. IF YOU CHECKED OFF ANY PROBLEMS, HOW DIFFICULT HAVE THESE PROBLEMS MADE IT FOR YOU TO DO YOUR WORK, TAKE CARE OF THINGS AT HOME, OR GET ALONG WITH OTHER PEOPLE: SOMEWHAT DIFFICULT
SUM OF ALL RESPONSES TO PHQ QUESTIONS 1-9: 3

## 2021-01-21 ASSESSMENT — ANXIETY QUESTIONNAIRES: GAD7 TOTAL SCORE: 2

## 2021-01-21 NOTE — SEDATION DOCUMENTATION
Pt here for DOT. Procedure was explained to the pt and the consent was signed. Discharge instructions were reviewed and a copy was given to the pt. Pt was given Fentanyl 100 mcg IV and Versed 2 mg IV for sedation. Pt tolerated the procedure without any adverse effects. Pt denies any pain or discomfort post procedure. Pt to remain NPO until 1:00pm. Probe # 61 was used. Pt will be discharged with wife to drive him home.

## 2021-01-21 NOTE — H&P
Cardiology [facility] - Pre DOT        History of Present Illness   Augusto Meeks is a 60 year old male who presents with of persistent longstanding AF who has undergone several cardioversions for AF in the past. Currently on pradaxa for AC. Presenting today for DOT prior to planned AF ablation tomorrow. Other pmh includes GABRIEL cpap compliant, HTN, and obesity.    reports he is in his usual state of health, denies any le edema, orthopnea or pnd. Denies any significant symptoms associated with AF except for progressive worsening fatigue over the last year. He did have covid 19 infection a few months ago, and has recovered well without significant persistent sxs.     Denies any chest pain, dysphagia, regurgitation, lightheadedness, dizziness, n/v/d, abd pain, or trouble breathing at this time.    PAST MEDICAL HISTORY:  Past Medical History:   Diagnosis Date     A-fib (H) 2019     Arrhythmia      Irregular heart beat      Sleep apnea        CURRENT MEDICATIONS:  Current Outpatient Medications   Medication Sig Dispense Refill     amLODIPine (NORVASC) 5 MG tablet Take 1 tablet (5 mg) by mouth daily 90 tablet 0     dabigatran ANTICOAGULANT (PRADAXA) 150 MG capsule Take 1 capsule (150 mg) by mouth 2 times daily Store in original 's bottle or blister pack; use within 120 days of opening. 60 capsule 0     metoprolol succinate ER (TOPROL-XL) 50 MG 24 hr tablet Take 1 tablet (50 mg) by mouth daily 90 tablet 1     rivaroxaban ANTICOAGULANT (XARELTO) 20 MG TABS tablet Take 1 tablet (20 mg) by mouth daily (with dinner) (Patient not taking: Reported on 1/20/2021) 90 tablet 3       PAST SURGICAL HISTORY:  Past Surgical History:   Procedure Laterality Date     ANESTHESIA CARDIOVERSION N/A 9/25/2019    Procedure: Anesthesia Coverage Transesophageal Echocardiogram, Cardioversion @0930;  Surgeon: GENERIC ANESTHESIA PROVIDER;  Location: [facility] OR     ANESTHESIA CARDIOVERSION N/A 10/30/2019    Procedure: ANESTHESIA, FOR CARDIOVERSION  @1100;  Surgeon: GENERIC ANESTHESIA PROVIDER;  Location: [facility] OR     ANESTHESIA CARDIOVERSION N/A 12/5/2019    Procedure: CARDIOVERSION;  Surgeon: GENERIC ANESTHESIA PROVIDER;  Location: [facility] OR     CARDIOVERSION  10/30/2019    Patient reported he had a cardioverson on 09/25/2019.     VASECTOMY         ALLERGIES     Allergies   Allergen Reactions     Lisinopril Cough       FAMILY HISTORY:  Family History   Problem Relation Age of Onset     Lung Cancer Mother      Cancer Father        SOCIAL HISTORY:  Social History     Socioeconomic History     Marital status:      Spouse name: Not on file     Number of children: Not on file     Years of education: Not on file     Highest education level: Not on file   Occupational History     Not on file   Social Needs     Financial resource strain: Not on file     Food insecurity     Worry: Not on file     Inability: Not on file     Transportation needs     Medical: Not on file     Non-medical: Not on file   Tobacco Use     Smoking status: Former Smoker     Years: 20.00     Types: Cigars     Smokeless tobacco: Never Used     Tobacco comment: occasional cigar   Substance and Sexual Activity     Alcohol use: Yes     Comment: occas      Drug use: Yes     Types: Marijuana     Comment: occas      Sexual activity: Yes     Partners: Female   Lifestyle     Physical activity     Days per week: Not on file     Minutes per session: Not on file     Stress: Not on file   Relationships     Social connections     Talks on phone: Not on file     Gets together: Not on file     Attends L[employer]-day service: Not on file     Active member of club or organization: Not on file     Attends meetings of clubs or organizations: Not on file     Relationship status: Not on file     Intimate partner violence     Fear of current or ex partner: Not on file     Emotionally abused: Not on file     Physically abused: Not on file     Forced sexual activity: Not on file   Other Topics Concern     Parent/sibling w/  CABG, MI or angioplasty before 65F 55M? No   Social History Narrative     Not on file       Review of Systems   The 10 point Review of Systems is negative other than noted in the HPI or here.     Physical Exam       BP: (!) 141/83 Pulse: 83   Resp: 18 SpO2: 100 % O2 Device: None (Room air)    Vital Signs with Ranges  Pulse:  [83] 83  Resp:  [18] 18  BP: (141)/(83) 141/83  SpO2:  [100 %] 100 %  0 lbs 0 oz    GEN: NAD, pleasant  HEENT: no icterus  CV: RRR, normal s1/s2, no murmurs/rubs/s3/s4, no heave. JVP difficult to visualize.  CHEST: CTAB  ABD: soft, NT/ND, NABS  : no flank/suprapubic tenderness  Extremities: no edema  NEURO: AA&Ox3, fluent/appropriate, motor grossly nonfocal  PSYCH: cooperative, affect appropriate    ASSESSMENT:   #Persistent longstanding non-valvular AF (JOSE-VaSc: 1)  #GABRIEL - CPAP compliant  #HTN  #Obesity    RECOMMEND:  Safe to proceed with DOT    Mohsan Chaudhry, MD  Division of Cardiology, PGY-4

## 2021-01-21 NOTE — PRE-PROCEDURE
GENERAL PRE-PROCEDURE:   Procedure:  DOT  Date/Time:  1/21/2021 10:53 AM    Verbal consent obtained?: Yes    Written consent obtained?: Yes    Risks and benefits: Risks, benefits and alternatives were discussed    Consent given by:  Patient  Patient states understanding of procedure being performed: Yes    Patient's understanding of procedure matches consent: Yes    Procedure consent matches procedure scheduled: Yes    Expected level of sedation:  Minimal  Appropriately NPO:  Yes  ASA Class:  Class 2- mild systemic disease, no acute problems, no functional limitations  Mallampati  :  Grade 3- soft palate visible, posterior pharyngeal wall not visible  Lungs:  Lungs clear with good breath sounds bilaterally  Heart:  Normal heart sounds and rate  History & Physical reviewed:  History and physical reviewed and no updates needed  Statement of review:  I have reviewed the lab findings, diagnostic data, medications, and the plan for sedation

## 2021-01-21 NOTE — TELEPHONE ENCOUNTER
Left voicemail for patient to complete Travel Screen for Cardiac Cath Lab appointment on 1/22 and inform patient of updated Visitor Policy.       covid positive on 11/4- within 90 days.

## 2021-01-22 ENCOUNTER — HOSPITAL ENCOUNTER (OUTPATIENT)
Facility: CLINIC | Age: 61
Discharge: HOME | End: 2021-01-22
Attending: INTERNAL MEDICINE | Admitting: INTERNAL MEDICINE
Payer: COMMERCIAL

## 2021-01-22 ENCOUNTER — ANESTHESIA (OUTPATIENT)
Dept: CARDIOLOGY | Facility: CLINIC | Age: 61
End: 2021-01-22
Payer: COMMERCIAL

## 2021-01-22 VITALS
WEIGHT: 237.66 LBS | HEIGHT: 72 IN | BODY MASS INDEX: 32.19 KG/M2 | TEMPERATURE: 98 F | SYSTOLIC BLOOD PRESSURE: 140 MMHG | RESPIRATION RATE: 16 BRPM | OXYGEN SATURATION: 99 % | DIASTOLIC BLOOD PRESSURE: 80 MMHG | HEART RATE: 81 BPM

## 2021-01-22 DIAGNOSIS — I48.19 PERSISTENT ATRIAL FIBRILLATION (H): ICD-10-CM

## 2021-01-22 LAB
ANION GAP SERPL CALCULATED.3IONS-SCNC: 6 MMOL/L (ref 3–14)
BUN SERPL-MCNC: 13 MG/DL (ref 7–30)
CALCIUM SERPL-MCNC: 8.7 MG/DL (ref 8.5–10.1)
CHLORIDE SERPL-SCNC: 108 MMOL/L (ref 94–109)
CO2 SERPL-SCNC: 25 MMOL/L (ref 20–32)
CREAT SERPL-MCNC: 0.82 MG/DL (ref 0.66–1.25)
ERYTHROCYTE [DISTWIDTH] IN BLOOD BY AUTOMATED COUNT: 14.9 % (ref 10–15)
GFR SERPL CREATININE-BSD FRML MDRD: >90 ML/MIN/{1.73_M2}
GLUCOSE BLDC GLUCOMTR-MCNC: 123 MG/DL (ref 70–99)
GLUCOSE SERPL-MCNC: 114 MG/DL (ref 70–99)
HCT VFR BLD AUTO: 37.3 % (ref 40–53)
HGB BLD-MCNC: 12.9 G/DL (ref 13.3–17.7)
KCT BLD-ACNC: 160 SEC (ref 75–150)
KCT BLD-ACNC: 160 SEC (ref 75–150)
KCT BLD-ACNC: 257 SEC (ref 75–150)
KCT BLD-ACNC: 270 SEC (ref 75–150)
KCT BLD-ACNC: 294 SEC (ref 75–150)
KCT BLD-ACNC: 298 SEC (ref 75–150)
KCT BLD-ACNC: 322 SEC (ref 75–150)
KCT BLD-ACNC: 339 SEC (ref 75–150)
KCT BLD-ACNC: 351 SEC (ref 75–150)
KCT BLD-ACNC: 359 SEC (ref 75–150)
MCH RBC QN AUTO: 33.2 PG (ref 26.5–33)
MCHC RBC AUTO-ENTMCNC: 34.6 G/DL (ref 31.5–36.5)
MCV RBC AUTO: 96 FL (ref 78–100)
PLATELET # BLD AUTO: 137 10E9/L (ref 150–450)
POTASSIUM SERPL-SCNC: 4.3 MMOL/L (ref 3.4–5.3)
RBC # BLD AUTO: 3.88 10E12/L (ref 4.4–5.9)
SODIUM SERPL-SCNC: 139 MMOL/L (ref 133–144)
WBC # BLD AUTO: 5.3 10E9/L (ref 4–11)

## 2021-01-22 PROCEDURE — 93005 ELECTROCARDIOGRAM TRACING: CPT

## 2021-01-22 PROCEDURE — 258N000003 HC RX IP 258 OP 636: Performed by: NURSE ANESTHETIST, CERTIFIED REGISTERED

## 2021-01-22 PROCEDURE — 85347 COAGULATION TIME ACTIVATED: CPT | Mod: 91

## 2021-01-22 PROCEDURE — 250N000009 HC RX 250: Performed by: NURSE ANESTHETIST, CERTIFIED REGISTERED

## 2021-01-22 PROCEDURE — C1733 CATH, EP, OTHR THAN COOL-TIP: HCPCS | Performed by: INTERNAL MEDICINE

## 2021-01-22 PROCEDURE — 250N000011 HC RX IP 250 OP 636: Performed by: NURSE ANESTHETIST, CERTIFIED REGISTERED

## 2021-01-22 PROCEDURE — 93621 COMP EP EVL L PAC&REC C SINS: CPT | Performed by: INTERNAL MEDICINE

## 2021-01-22 PROCEDURE — 36415 COLL VENOUS BLD VENIPUNCTURE: CPT | Performed by: INTERNAL MEDICINE

## 2021-01-22 PROCEDURE — C1894 INTRO/SHEATH, NON-LASER: HCPCS | Performed by: INTERNAL MEDICINE

## 2021-01-22 PROCEDURE — C1730 CATH, EP, 19 OR FEW ELECT: HCPCS | Performed by: INTERNAL MEDICINE

## 2021-01-22 PROCEDURE — 250N000011 HC RX IP 250 OP 636

## 2021-01-22 PROCEDURE — 999N000054 HC STATISTIC EKG NON-CHARGEABLE

## 2021-01-22 PROCEDURE — 85027 COMPLETE CBC AUTOMATED: CPT | Performed by: INTERNAL MEDICINE

## 2021-01-22 PROCEDURE — 93662 INTRACARDIAC ECG (ICE): CPT | Performed by: INTERNAL MEDICINE

## 2021-01-22 PROCEDURE — 93656 COMPRE EP EVAL ABLTJ ATR FIB: CPT | Performed by: INTERNAL MEDICINE

## 2021-01-22 PROCEDURE — 272N000001 HC OR GENERAL SUPPLY STERILE: Performed by: INTERNAL MEDICINE

## 2021-01-22 PROCEDURE — 93010 ELECTROCARDIOGRAM REPORT: CPT | Performed by: INTERNAL MEDICINE

## 2021-01-22 PROCEDURE — 999N001017 HC STATISTIC GLUCOSE BY METER IP

## 2021-01-22 PROCEDURE — C1732 CATH, EP, DIAG/ABL, 3D/VECT: HCPCS | Performed by: INTERNAL MEDICINE

## 2021-01-22 PROCEDURE — 370N000017 HC ANESTHESIA TECHNICAL FEE, PER MIN: Performed by: INTERNAL MEDICINE

## 2021-01-22 PROCEDURE — 93462 L HRT CATH TRNSPTL PUNCTURE: CPT | Performed by: INTERNAL MEDICINE

## 2021-01-22 PROCEDURE — 93613 INTRACARDIAC EPHYS 3D MAPG: CPT | Performed by: INTERNAL MEDICINE

## 2021-01-22 PROCEDURE — 80048 BASIC METABOLIC PNL TOTAL CA: CPT | Performed by: INTERNAL MEDICINE

## 2021-01-22 PROCEDURE — C1759 CATH, INTRA ECHOCARDIOGRAPHY: HCPCS | Performed by: INTERNAL MEDICINE

## 2021-01-22 RX ORDER — FENTANYL CITRATE 50 UG/ML
25-50 INJECTION, SOLUTION INTRAMUSCULAR; INTRAVENOUS
Status: DISCONTINUED | OUTPATIENT
Start: 2021-01-22 | End: 2021-01-22 | Stop reason: HOSPADM

## 2021-01-22 RX ORDER — SODIUM CHLORIDE, SODIUM LACTATE, POTASSIUM CHLORIDE, CALCIUM CHLORIDE 600; 310; 30; 20 MG/100ML; MG/100ML; MG/100ML; MG/100ML
INJECTION, SOLUTION INTRAVENOUS CONTINUOUS
Status: DISCONTINUED | OUTPATIENT
Start: 2021-01-22 | End: 2021-01-22 | Stop reason: HOSPADM

## 2021-01-22 RX ORDER — NALOXONE HYDROCHLORIDE 0.4 MG/ML
0.4 INJECTION, SOLUTION INTRAMUSCULAR; INTRAVENOUS; SUBCUTANEOUS
Status: DISCONTINUED | OUTPATIENT
Start: 2021-01-22 | End: 2021-01-22 | Stop reason: HOSPADM

## 2021-01-22 RX ORDER — LIDOCAINE 40 MG/G
CREAM TOPICAL
Status: DISCONTINUED | OUTPATIENT
Start: 2021-01-22 | End: 2021-01-22 | Stop reason: HOSPADM

## 2021-01-22 RX ORDER — NALOXONE HYDROCHLORIDE 0.4 MG/ML
0.2 INJECTION, SOLUTION INTRAMUSCULAR; INTRAVENOUS; SUBCUTANEOUS
Status: DISCONTINUED | OUTPATIENT
Start: 2021-01-22 | End: 2021-01-22 | Stop reason: HOSPADM

## 2021-01-22 RX ORDER — DEXAMETHASONE SODIUM PHOSPHATE 4 MG/ML
INJECTION, SOLUTION INTRA-ARTICULAR; INTRALESIONAL; INTRAMUSCULAR; INTRAVENOUS; SOFT TISSUE PRN
Status: DISCONTINUED | OUTPATIENT
Start: 2021-01-22 | End: 2021-01-22

## 2021-01-22 RX ORDER — SODIUM CHLORIDE, SODIUM LACTATE, POTASSIUM CHLORIDE, CALCIUM CHLORIDE 600; 310; 30; 20 MG/100ML; MG/100ML; MG/100ML; MG/100ML
INJECTION, SOLUTION INTRAVENOUS CONTINUOUS PRN
Status: DISCONTINUED | OUTPATIENT
Start: 2021-01-22 | End: 2021-01-22

## 2021-01-22 RX ORDER — ONDANSETRON 2 MG/ML
INJECTION INTRAMUSCULAR; INTRAVENOUS PRN
Status: DISCONTINUED | OUTPATIENT
Start: 2021-01-22 | End: 2021-01-22

## 2021-01-22 RX ORDER — ATROPINE SULFATE 0.1 MG/ML
0.5 INJECTION INTRAVENOUS EVERY 5 MIN PRN
Status: DISCONTINUED | OUTPATIENT
Start: 2021-01-22 | End: 2021-01-22 | Stop reason: HOSPADM

## 2021-01-22 RX ORDER — ONDANSETRON 2 MG/ML
4 INJECTION INTRAMUSCULAR; INTRAVENOUS EVERY 30 MIN PRN
Status: DISCONTINUED | OUTPATIENT
Start: 2021-01-22 | End: 2021-01-22 | Stop reason: HOSPADM

## 2021-01-22 RX ORDER — OXYCODONE AND ACETAMINOPHEN 5; 325 MG/1; MG/1
1 TABLET ORAL EVERY 4 HOURS PRN
Status: DISCONTINUED | OUTPATIENT
Start: 2021-01-22 | End: 2021-01-22 | Stop reason: HOSPADM

## 2021-01-22 RX ORDER — ONDANSETRON 4 MG/1
4 TABLET, ORALLY DISINTEGRATING ORAL EVERY 30 MIN PRN
Status: DISCONTINUED | OUTPATIENT
Start: 2021-01-22 | End: 2021-01-22 | Stop reason: HOSPADM

## 2021-01-22 RX ORDER — FENTANYL CITRATE 50 UG/ML
INJECTION, SOLUTION INTRAMUSCULAR; INTRAVENOUS PRN
Status: DISCONTINUED | OUTPATIENT
Start: 2021-01-22 | End: 2021-01-22

## 2021-01-22 RX ORDER — PROPOFOL 10 MG/ML
INJECTION, EMULSION INTRAVENOUS PRN
Status: DISCONTINUED | OUTPATIENT
Start: 2021-01-22 | End: 2021-01-22

## 2021-01-22 RX ORDER — FLUMAZENIL 0.1 MG/ML
0.2 INJECTION, SOLUTION INTRAVENOUS
Status: DISCONTINUED | OUTPATIENT
Start: 2021-01-22 | End: 2021-01-22 | Stop reason: HOSPADM

## 2021-01-22 RX ORDER — PROTAMINE SULFATE 10 MG/ML
INJECTION, SOLUTION INTRAVENOUS PRN
Status: DISCONTINUED | OUTPATIENT
Start: 2021-01-22 | End: 2021-01-22

## 2021-01-22 RX ORDER — HEPARIN SODIUM 1000 [USP'U]/ML
INJECTION, SOLUTION INTRAVENOUS; SUBCUTANEOUS PRN
Status: DISCONTINUED | OUTPATIENT
Start: 2021-01-22 | End: 2021-01-22

## 2021-01-22 RX ORDER — HEPARIN SODIUM 1000 [USP'U]/ML
INJECTION, SOLUTION INTRAVENOUS; SUBCUTANEOUS CONTINUOUS PRN
Status: DISCONTINUED | OUTPATIENT
Start: 2021-01-22 | End: 2021-01-22

## 2021-01-22 RX ORDER — DEXMEDETOMIDINE HYDROCHLORIDE 4 UG/ML
0.2-1.2 INJECTION, SOLUTION INTRAVENOUS CONTINUOUS
Status: DISCONTINUED | OUTPATIENT
Start: 2021-01-22 | End: 2021-01-22 | Stop reason: HOSPADM

## 2021-01-22 RX ADMIN — DEXMEDETOMIDINE HYDROCHLORIDE 0.2 MCG/KG/HR: 100 INJECTION, SOLUTION INTRAVENOUS at 15:07

## 2021-01-22 RX ADMIN — SODIUM CHLORIDE, POTASSIUM CHLORIDE, SODIUM LACTATE AND CALCIUM CHLORIDE: 600; 310; 30; 20 INJECTION, SOLUTION INTRAVENOUS at 10:05

## 2021-01-22 RX ADMIN — SODIUM CHLORIDE, POTASSIUM CHLORIDE, SODIUM LACTATE AND CALCIUM CHLORIDE: 600; 310; 30; 20 INJECTION, SOLUTION INTRAVENOUS at 11:30

## 2021-01-22 RX ADMIN — HEPARIN SODIUM 2000 UNITS: 1000 INJECTION, SOLUTION INTRAVENOUS; SUBCUTANEOUS at 14:00

## 2021-01-22 RX ADMIN — SODIUM CHLORIDE, POTASSIUM CHLORIDE, SODIUM LACTATE AND CALCIUM CHLORIDE: 600; 310; 30; 20 INJECTION, SOLUTION INTRAVENOUS at 13:42

## 2021-01-22 RX ADMIN — FENTANYL CITRATE 50 MCG: 50 INJECTION, SOLUTION INTRAMUSCULAR; INTRAVENOUS at 14:54

## 2021-01-22 RX ADMIN — ROCURONIUM BROMIDE 20 MG: 10 INJECTION INTRAVENOUS at 12:07

## 2021-01-22 RX ADMIN — ROCURONIUM BROMIDE 100 MG: 10 INJECTION INTRAVENOUS at 10:13

## 2021-01-22 RX ADMIN — SUGAMMADEX 200 MG: 100 INJECTION, SOLUTION INTRAVENOUS at 15:30

## 2021-01-22 RX ADMIN — HEPARIN SODIUM 1500 UNITS/HR: 1000 INJECTION, SOLUTION INTRAVENOUS; SUBCUTANEOUS at 11:35

## 2021-01-22 RX ADMIN — ROCURONIUM BROMIDE 20 MG: 10 INJECTION INTRAVENOUS at 13:56

## 2021-01-22 RX ADMIN — HEPARIN SODIUM 3000 UNITS: 1000 INJECTION, SOLUTION INTRAVENOUS; SUBCUTANEOUS at 12:05

## 2021-01-22 RX ADMIN — DEXAMETHASONE SODIUM PHOSPHATE 6 MG: 4 INJECTION, SOLUTION INTRA-ARTICULAR; INTRALESIONAL; INTRAMUSCULAR; INTRAVENOUS; SOFT TISSUE at 10:23

## 2021-01-22 RX ADMIN — ROCURONIUM BROMIDE 20 MG: 10 INJECTION INTRAVENOUS at 14:54

## 2021-01-22 RX ADMIN — ONDANSETRON 4 MG: 2 INJECTION INTRAMUSCULAR; INTRAVENOUS at 15:10

## 2021-01-22 RX ADMIN — FENTANYL CITRATE 100 MCG: 50 INJECTION, SOLUTION INTRAMUSCULAR; INTRAVENOUS at 10:07

## 2021-01-22 RX ADMIN — ROCURONIUM BROMIDE 20 MG: 10 INJECTION INTRAVENOUS at 11:46

## 2021-01-22 RX ADMIN — ROCURONIUM BROMIDE 20 MG: 10 INJECTION INTRAVENOUS at 14:21

## 2021-01-22 RX ADMIN — HEPARIN SODIUM 10000 UNITS: 1000 INJECTION, SOLUTION INTRAVENOUS; SUBCUTANEOUS at 11:15

## 2021-01-22 RX ADMIN — MIDAZOLAM 2 MG: 1 INJECTION INTRAMUSCULAR; INTRAVENOUS at 09:53

## 2021-01-22 RX ADMIN — ROCURONIUM BROMIDE 20 MG: 10 INJECTION INTRAVENOUS at 12:35

## 2021-01-22 RX ADMIN — PROTAMINE SULFATE 50 MG: 10 INJECTION, SOLUTION INTRAVENOUS at 15:15

## 2021-01-22 RX ADMIN — ROCURONIUM BROMIDE 20 MG: 10 INJECTION INTRAVENOUS at 13:00

## 2021-01-22 RX ADMIN — HEPARIN SODIUM 5000 UNITS: 1000 INJECTION, SOLUTION INTRAVENOUS; SUBCUTANEOUS at 13:17

## 2021-01-22 RX ADMIN — PROPOFOL 120 MG: 10 INJECTION, EMULSION INTRAVENOUS at 10:13

## 2021-01-22 ASSESSMENT — MIFFLIN-ST. JEOR: SCORE: 1931

## 2021-01-22 NOTE — Clinical Note
Arrhythmia Type: atrial fibrillation.   Energy shock delivered: 200 joules.   Time shock delivered: 11:19.   Post cardioversion rhythm: atrial fibrillation.

## 2021-01-22 NOTE — ANESTHESIA PROCEDURE NOTES
Airway   Date/Time: 1/22/2021 10:18 AM   Patient location during procedure: OR  Staff -   Performed By: CRNA    Consent for Airway   Urgency: elective    Indications and Patient Condition  Indications for airway management: jose-procedural  Induction type:intravenousMask difficulty assessment: 2 - vent by mask + OA or adjuvant +/- NMBA    Final Airway Details  Final airway type: endotracheal airway  Successful airway:ETT - single  Endotracheal Airway Details   ETT size (mm): 8.0  Cuffed: yes  Cuff volume (mL): 8  Successful intubation technique: video laryngoscopy  Grade View of Cords: 1  Adjucts: stylet  Measured from: gums/teeth  Secured at (cm): 24  Secured with: plastic tape  Bite block used: None    Post intubation assessment   Placement verified by: capnometry, equal breath sounds and chest rise   Number of attempts at approach: 1  Secured with:plastic tape  Ease of procedure: easy  Dentition: Intact and Unchanged

## 2021-01-22 NOTE — PROGRESS NOTES
Admission noteD/I/A: Pt roomed on 3C in bay so24.  Arrived via litter and accompanied by RN  monitor.  VSS. SR  Rhythm upon arrival SR on monitor.  Denies pain or sob.  Reviewed activity restrictions and when to notify RN, ie-changes to breathing or increased chest pressure or chest pain.  CCL access:  Bilateral groin sites, CDI, no bleeding no hematoma. Pt c/o back pain upon arrival to bay. Small roll placed at lower back with some relief. Pt to remain on bedrest until 1930.Belongings to be retrieved from PACU.  P: Continue to monitor status.  Discharge to home once meeting criteria.

## 2021-01-22 NOTE — Clinical Note
Arrhythmia Type: atrial fibrillation.   Method of Cardioversion: synchronous.   Energy shock delivered: 200 joules.   Time shock delivered: 11:20.   Post cardioversion rhythm: sinus rhythm.

## 2021-01-22 NOTE — ANESTHESIA POSTPROCEDURE EVALUATION
Patient: Augusto Meeks    Procedure(s):  EP ABLATION FOCAL AFIB    Diagnosis:atrial fibrillation  Diagnosis Additional Information: No value filed.    Anesthesia Type:  No value filed.    Note:  Disposition: Outpatient   Postop Pain Control: Uneventful            Sign Out: Well controlled pain   PONV:    Neuro/Psych: Uneventful            Sign Out: Acceptable/Baseline neuro status   Airway/Respiratory: Uneventful            Sign Out: Acceptable/Baseline resp. status   CV/Hemodynamics: Uneventful            Sign Out: Acceptable CV status   Other NRE:    DID A NON-ROUTINE EVENT OCCUR?          Last vitals:  Vitals:    01/22/21 1012 01/22/21 1555 01/22/21 1600   BP:   128/79   Pulse:  75 74   Resp: 18 23 23   Temp:  37.1  C (98.7  F)    SpO2:  98% 96%       Electronically Signed By: Avi Jeter MD  January 22, 2021  4:09 PM

## 2021-01-22 NOTE — ANESTHESIA CARE TRANSFER NOTE
Patient: Augusto Meeks    Procedure(s):  EP ABLATION FOCAL AFIB    Diagnosis: atrial fibrillation  Diagnosis Additional Information: No value filed.    Anesthesia Type:   No value filed.     Note:    Oropharynx: oropharynx clear of all foreign objects and spontaneously breathing  Level of Consciousness: awake  Oxygen Supplementation: nasal cannula  Level of Supplemental Oxygen: 3L  Independent Airway: airway patency satisfactory and stable  Dentition: dentition unchanged  Vital Signs Stable: post-procedure vital signs reviewed and stable  Report to RN Given: handoff report given  Patient transferred to: PACU  Comments: Pt extubated in the OR without incident or complications. Pt VSS upon arrival to the PACU. Pt has no c/o pain/N/V. Pt care report given to receiving RN>   Handoff Report: Identifed the Patient, Identified the Reponsible Provider, Reviewed the pertinent medical history, Discussed the surgical course, Reviewed Intra-OP anesthesia mangement and issues during anesthesia, Set expectations for post-procedure period and Allowed opportunity for questions and acknowledgement of understanding      Vitals: (Last set prior to Anesthesia Care Transfer)  CRNA VITALS  1/22/2021 1515 - 1/22/2021 1556      1/22/2021             Resp Rate (observed):  (!) 2        Electronically Signed By: JELANI Adlana CRNA  January 22, 2021  3:56 PM

## 2021-01-22 NOTE — ANESTHESIA PREPROCEDURE EVALUATION
Anesthesia Pre-Procedure Evaluation    Patient: Augusto Meeks   MRN: [id number] : 1960        Preoperative Diagnosis: atrial fibrillation   Procedure : Procedure(s):  EP ABLATION FOCAL AFIB     Past Medical History:   Diagnosis Date      (2019     Arrhythmia      Irregular heart beat      Sleep apnea       Past Surgical History:   Procedure Laterality Date     ANESTHESIA CARDIOVERSION N/A 2019    Procedure: Anesthesia Coverage Transesophageal Echocardiogram, Cardioversion @0930;  Surgeon: GENERIC ANESTHESIA PROVIDER;  Location: [facility] OR     ANESTHESIA CARDIOVERSION N/A 10/30/2019    Procedure: ANESTHESIA, FOR CARDIOVERSION @1100;  Surgeon: GENERIC ANESTHESIA PROVIDER;  Location: [facility] OR     ANESTHESIA CARDIOVERSION N/A 2019    Procedure: CARDIOVERSION;  Surgeon: GENERIC ANESTHESIA PROVIDER;  Location: [facility] OR     CARDIOVERSION  10/30/2019    Patient reported he had a cardioverson on 2019.     VASECTOMY        Allergies   Allergen Reactions     Lisinopril Cough      Social History     Tobacco Use     Smoking status: Former Smoker     Years: 20.00     Types: Cigars     Smokeless tobacco: Never Used     Tobacco comment: occasional cigar   Substance Use Topics     Alcohol use: Yes     Comment: occas       Wt Readings from Last 1 Encounters:   21 107.8 kg (237 lb 10.5 oz)              OUTSIDE LABS:  CBC:   Lab Results   Component Value Date    WBC 5.3 2021    HGB 12.9 (L) 2021    HCT 37.3 (L) 2021     (L) 2021     BMP:   Lab Results   Component Value Date     2021     11/15/2019    POTASSIUM 4.3 2021    POTASSIUM 4.2 2019    CHLORIDE 108 2021    CHLORIDE 104 11/15/2019    CO2 25 2021    CO2 28 11/15/2019    BUN 13 2021    BUN 16 11/15/2019    CR 0.82 2021    CR 0.89 11/15/2019     (H) 2021     (H) 11/15/2019     COAGS:   Lab Results   Component Value Date    INR 1.39 (H) 2019      POC:   Lab Results   Component Value Date     (H) 01/22/2021     HEPATIC:   Lab Results   Component Value Date    ALBUMIN 4.2 11/15/2019    PROTTOTAL 7.7 11/15/2019    ALT 48 11/15/2019    AST 20 11/15/2019    ALKPHOS 71 11/15/2019    BILITOTAL 0.7 11/15/2019     OTHER:   Lab Results   Component Value Date    A1C 4.8 11/15/2019    CHALINO 8.7 01/22/2021    MAG 2.0 12/05/2019    TSH 2.25 11/15/2019    T4 1.06 11/15/2019       Anesthesia Plan     History & Physical Review      ASA Status:  3.   Plan for General with Intravenous induction. Device: ETT Maintenance will be Balanced.     Additional equipment: 2nd IV and Arterial Line.    PONV prophylaxis:  Ondansetron (or other 5HT-3) and Dexamethasone or Solumedrol.       Consents  Anesthesia Plan(s) and associated risks, benefits, and realistic alternatives discussed.    Questions answered and patient/representative(s) expressed understanding.    Discussed with:  Patient.       Extended Intubation/Ventilatory Support Discussed Yes. Patient is DNR/DNI Status: Yes.     Use of blood products discussed: No.   Discussed with: Patient.        Postoperative Care  Postoperative pain management: IV analgesics.           Avi Jeter MD

## 2021-01-22 NOTE — Clinical Note
Potential access sites were evaluated for patency using ultrasound.   The left femoral vein was selected. Access was obtained under with Sonosite guidance using a micropuncture 21 guage needle with direct visualization of needle entry. x2

## 2021-01-22 NOTE — ANESTHESIA PROCEDURE NOTES
Arterial Line Procedure Note    Staff -   Anesthesiologist:  Avi Jeter MD  Performed By: anesthesiologist  Location: In OR After Induction  Procedure Start/Stop Times:     patient identified, IV checked, site marked, risks and benefits discussed, informed consent, monitors and equipment checked, pre-op evaluation and at physician/surgeon's request      Correct Patient: Yes      Correct Position: Yes      Correct Site: Yes      Correct Procedure: Yes      Correct Laterality:  Yes    Site Marked:  Yes  Line Placement:     Procedure:  Arterial Line    Insertion Site:  Radial    Insertion laterality:  Left    Skin Prep: Chloraprep      Patient Prep: patient draped, mask, sterile gloves, hat and hand hygiene      Local skin infiltration:  None    Ultrasound Guided?: Yes      Artery evaluated via ultrasound confirming patency.   Using realtime imaging, the artery was punctured and the needle was observed entering the artery.      A permanent image is NOT entered into the patient's record.      Catheter size:  20 gauge, Quick cath    Cath secured with: anchor securement device      Dressing:  Tegaderm    Complications:  None obvious    Arterial waveform: Yes

## 2021-01-22 NOTE — Clinical Note
Case will be done under general anesthesia; CRNA and/or MDA present throughout case. Please see anesthesia record for full documentation of medications, hemodynamics, respiratory status, and level of consciousness.

## 2021-01-23 NOTE — PROGRESS NOTES
Discharge  Pt discharged to home after successful ablation. Pt has remained in SR rates in the 70-80's, other VSS. Pt's bilateral groin sites CDI, small dime sized blood on both prima pore dressings, unchanged since arrival. CMS intact to both LE. Pt tolerated 1/2 sandwich and some cookies and water. Pt's bonilla removed at 1900, awaiting to void.Pt ambulated on unit  reporting some lightheadedness but stated improving. Pt instructed on all discharge medications, diet, activity restrictions, post procedural site care and follow up appointments. Pt denied any questions or complaints.Pt's wife in route to pick pt up. Pt's PIV removed and belongings returned.Pt escorted to front door via  wc safely.

## 2021-01-23 NOTE — DISCHARGE INSTRUCTIONS
______________________________________________      After you go home:    Have an adult stay with you for 24 hours.    Drink plenty of fluids.    You may eat your normal diet, unless your doctor tells you otherwise.    For 24 hours:    Relax and take it easy.    Do NOT smoke.    Do NOT make any important or legal decisions.    Do NOT drive or operate machines at home or at work.    Do NOT drink alcohol.    Remove the Band-Aid after 24 hours. If there is minor oozing, apply another Band-aid and remove it after 12 hours.    For 2 days, do NOT have sex or do any heavy exercise.    Do NOT take a bath, or use a hot tub or pool for at least 3 days. You may shower.    Care of groin site  It is normal to have a small bruise or lump at the site.    Do not scrub the site.    For the first 2 days: Do not stoop or squat. When you cough, sneeze or move your bowels, hold your hand over the puncture site and press gently.    Do not lift more than 10 pounds for at least 3 to 5 days.    Do not use lotion or powder near the puncture site for 3 days.    If you start bleeding from the site in your groin, lie down flat and press firmly  on the site. Call your doctor as soon as you can.      .    Medicines    If you have started taking Plavix or Effient, do not stop taking it until you talk to your heart doctor (cardiologist).    If you are on metformin (Glucophage), do not restart it until you have blood tests (within 2 to 3 days after discharge). When your doctor tells you it is safe, you may restart the metformin.    If you have stopped any other medicines, check with your nurse or provider about when to restart them.    Call 911 right away if you have bleeding that is heavy or does not stop.    Call your doctor if:    You have a large or growing hard lump around the site.    The site is red, swollen, hot or tender.    Blood or fluid is draining from the site.    You have chills or a fever greater than 101 F (38 C).    Your leg or  arm feels numb or cool.    You have hives, a rash or unusual itching.      [facility] [place]:  [phone] (7 days a week)

## 2021-01-25 NOTE — TELEPHONE ENCOUNTER
Spoke with the patient. Soonest appt is 2/24/21with Dr. New. He declined rescheduling at this time. Will call back if cancellation occurs.

## 2021-01-26 LAB — INTERPRETATION ECG - MUSE: NORMAL

## 2021-01-28 ENCOUNTER — TELEPHONE (OUTPATIENT)
Dept: CARDIOLOGY | Facility: CLINIC | Age: 61
End: 2021-01-28

## 2021-01-28 DIAGNOSIS — R91.8 PULMONARY NODULES: Primary | ICD-10-CM

## 2021-01-28 NOTE — CONFIDENTIAL NOTE
Vanessa Clement MD   1/22/2021 11:13 AM CST      Refer to lung nodule clinic       MPRESSION:   1. No acute airspace opacity.   2. Sub 6mm solid and ground glass nodules as detailed in the body of  the report. If patient is low risk these do not require additional  follow ups. If high risk, recommend followup with noncontrast chest CT  in 12 months time.   3. Please see cardiology report for detailed analysis of the cardiac  Structures.    PATIENT INFORMED OF RESULT NOTE.  LUNG NODULE REFERRAL PLACED, # GIVEN TO PATIENT TO CALL IF HE DOES NOT HEAR FROM CLINIC IN 48 HOURS.  ALSO MAILED INFORMATION REGARDING LUNG NODULE TO PATIENT.    Beronica Montoya RN  Cardiology Care Coordinator  [facility]  [phone] option 1

## 2021-01-29 ENCOUNTER — TELEPHONE (OUTPATIENT)
Dept: ONCOLOGY | Facility: CLINIC | Age: 61
End: 2021-01-29

## 2021-01-29 DIAGNOSIS — R91.8 PULMONARY NODULES: Primary | ICD-10-CM

## 2021-02-01 NOTE — TELEPHONE ENCOUNTER
ONCOLOGY INTAKE: Records Information      APPT INFORMATION:  Referring provider:  Vanessa Clement MD  Referring provider s clinic:  [facility]  Reason for visit/diagnosis:  Pulmonary nodules  Has patient been notified of appointment date and time?: Yes    RECORDS INFORMATION:  Were the records received with the referral (via [employer])? No    Has patient been seen for any external appt for this diagnosis? No    If yes, where? N/a    Has patient had any imaging or procedures outside of [place]  [facility] for this condition? No      If Yes, where? N/a    ADDITIONAL INFORMATION:  None     none

## 2021-02-02 NOTE — TELEPHONE ENCOUNTER
RECORDS STATUS - ALL OTHER DIAGNOSIS      RECORDS RECEIVED FROM: [facility]   DATE RECEIVED: 2/2/21   NOTES STATUS DETAILS   OFFICE NOTE from referring provider Vanessa Li MD in [facility]   OFFICE NOTE from medical oncologist     DISCHARGE SUMMARY from hospital [facility] 1/22/21, 12/5/19,10/30/19, 9/25/19   DISCHARGE REPORT from the ER     OPERATIVE REPORT Epic 1/22/21: Ablation  12/5/19, 10/30/19, 9/25/19: Cardioversion    MEDICATION LIST [facility] 12/10/20   CLINICAL TRIAL TREATMENTS TO DATE     LABS     PATHOLOGY REPORTS     ANYTHING RELATED TO DIAGNOSIS Epic 1/22/21   GENONOMIC TESTING     TYPE:     IMAGING (NEED IMAGES & REPORT)     CT SCANS PACS/Scheduled @  2/18/21, 1/21/21: [facility]   MRI     MAMMO     ULTRASOUND     PET

## 2021-02-18 ENCOUNTER — ANCILLARY PROCEDURE (OUTPATIENT)
Dept: CT IMAGING | Facility: CLINIC | Age: 61
End: 2021-02-18
Attending: CLINICAL NURSE SPECIALIST
Payer: COMMERCIAL

## 2021-02-18 DIAGNOSIS — R91.8 PULMONARY NODULES: ICD-10-CM

## 2021-02-18 PROCEDURE — 71250 CT THORAX DX C-: CPT | Performed by: RADIOLOGY

## 2021-02-23 ENCOUNTER — PRE VISIT (OUTPATIENT)
Dept: PULMONOLOGY | Facility: CLINIC | Age: 61
End: 2021-02-23

## 2021-02-23 ENCOUNTER — VIRTUAL VISIT (OUTPATIENT)
Dept: PULMONOLOGY | Facility: CLINIC | Age: 61
End: 2021-02-23
Attending: INTERNAL MEDICINE
Payer: COMMERCIAL

## 2021-02-23 DIAGNOSIS — R91.8 PULMONARY NODULES: ICD-10-CM

## 2021-02-23 PROCEDURE — 99204 OFFICE O/P NEW MOD 45 MIN: CPT | Mod: 95 | Performed by: INTERNAL MEDICINE

## 2021-02-23 PROCEDURE — 999N001193 HC VIDEO/TELEPHONE VISIT; NO CHARGE

## 2021-02-23 NOTE — PROGRESS NOTES
Chandu is a 60 year old who is being evaluated via a billable video visit.      How would you like to obtain your AVS? Mail a copy  If the video visit is dropped, the invitation should be resent by: Text to cell phone: [phone]  Will anyone else be joining your video visit? No      Vitals - Patient Reported  Weight (Patient Reported): 106.6 kg (235 lb)  Height (Patient Reported): 182.9 cm (6')  BMI (Based on Pt Reported Ht/Wt): 31.87  Pain Score: No Pain (0)      I have reviewed and updated patient's allergy and medication list.    Concerns: NO NEW CONCERNS  Refills: NONE      Tracy Bronson CMA    Tele 25min

## 2021-02-23 NOTE — LETTER
"2/23/2021       RE: Augusto Meeks  [address] [facility] [address]     Dear Colleague,    Thank you for referring your patient, Augusto Meeks, to the [facility] at . Please see a copy of my visit note below.    Chandu is a 60 year old who is being evaluated via a billable video visit.      How would you like to obtain your AVS? Mail a copy  If the video visit is dropped, the invitation should be resent by: Text to cell phone: [phone]  Will anyone else be joining your video visit? No      Vitals - Patient Reported  Weight (Patient Reported): 106.6 kg (235 lb)  Height (Patient Reported): 182.9 cm (6')  BMI (Based on Pt Reported Ht/Wt): 31.87  Pain Score: No Pain (0)      I have reviewed and updated patient's allergy and medication list.    Concerns: NO NEW CONCERNS  Refills: NONE      Tracy Bronson [facility]    Tele 25min    LUNG NODULE & INTERVENTIONAL PULMONARY CLINIC  CLINICS & SURGERY CENTER, [facility]   VIDEO VISIT    Augusto Meeks MRN# [id number]   Age: 60 year old YOB: 1960     Reason for Consultation: lung nodule(s)    Requesting Physician: Vanessa Clement MD  [address] [place] [id number]       The patient has been notified of following:   \"This video visit will be conducted via a call between you and your physician/provider. We have found that certain health care needs can be provided without the need for an in-person physical exam.  This service lets us provide the care you need with a video conversation.  If a prescription is necessary we can send it directly to your pharmacy.  If lab work is needed we can place an order for that and you can then stop by our lab to have the test done at a later time.  Video visits are billed at different rates depending on your insurance coverage.  Please reach out to your insurance provider with any " "questions.  If during the course of the call the physician/provider feels a video visit is not appropriate, you will not be charged for this service.\"  Patient has given verbal consent for Video visit? Yes  How would you like to obtain your AVS? Please refer to rooming staff note  Patient would like the video invitation sent by: Please refer to rooming staff note   Will anyone else be joining your video visit? Please refer to rooming staff note       Video-Visit Details     Tele 25min     Hiram Covarrubias MD      Assessment and Plan:    1. New multiple pulmonary lung nodule(s). Given the characteristics on current/previous imaging and risk factors; I would classify this to be Intermediate (6-65%) risk for cancer. Multiple bilateral sub6mm ggo's. Cont Flememoner, next CT in 6mo       Billing: I spent 30 minutes including face to face, chart review, personal and documented interpretation of results, counseling and coordination of care about the issues above.    Hiram Covarrubias MD   of Medicine  Interventional Pulmonology  Department of Pulmonary, Allergy, Critical Care and Sleep Medicine   [facility]  Pager: [phone]          History:     Augusto Meeks is a 60 year old male with sig h/o for afib, GABRIEL who is here for evaluation/followup of lung nodule(s).    - No new resp sx or complaints. Denies dyspnea or cough.   - here for eval of nodules  - Personal hx of cancer: na  - Family hx of cancer: na  - Exposure hx: Denies asbestos or radon exposure   - Tobacco hx: Past Smoker: 1ppd for 30years. Quit 10yrs ago. Has an occasional cigar.  - My interpretation of the images relevant for this visit includes: ggo   - My interpretation of the PFT's relevant for this visit includes: None     Culprit Nodule(s):   1. sub6mm ggo bilaterally. First seen 2/18/21.     Other active medical problems include:   - has afib. Stable on BB and anticoagulation   - has GABRIEL. On CPAP           Past " Medical History:      Past Medical History:   Diagnosis Date     A-fib (H) 2019     Arrhythmia      Irregular heart beat      Sleep apnea              Past Surgical History:      Past Surgical History:   Procedure Laterality Date     ANESTHESIA CARDIOVERSION N/A 9/25/2019    Procedure: Anesthesia Coverage Transesophageal Echocardiogram, Cardioversion @0930;  Surgeon: GENERIC ANESTHESIA PROVIDER;  Location: [facility]     ANESTHESIA CARDIOVERSION N/A 10/30/2019    Procedure: ANESTHESIA, FOR CARDIOVERSION @1100;  Surgeon: GENERIC ANESTHESIA PROVIDER;  Location: [facility] OR     ANESTHESIA CARDIOVERSION N/A 12/5/2019    Procedure: CARDIOVERSION;  Surgeon: GENERIC ANESTHESIA PROVIDER;  Location: [facility] OR     CARDIOVERSION  10/30/2019    Patient reported he had a cardioverson on 09/25/2019.     EP ABLATION FOCAL AFIB N/A 1/22/2021    Procedure: EP ABLATION FOCAL AFIB;  Surgeon: Vanessa Clement MD;  Location:  HEART CARDIAC CATH LAB     VASECTOMY              Social History:     Social History     Tobacco Use     Smoking status: Former Smoker     Years: 20.00     Types: Cigars     Smokeless tobacco: Never Used     Tobacco comment: occasional cigar   Substance Use Topics     Alcohol use: Yes     Comment: occas             Family History:     Family History   Problem Relation Age of Onset     Lung Cancer Mother      Cancer Father              Allergies:      Allergies   Allergen Reactions     Lisinopril Cough            Medications:     Current Outpatient Medications   Medication Sig     amLODIPine (NORVASC) 5 MG tablet Take 1 tablet (5 mg) by mouth daily     metoprolol succinate ER (TOPROL-XL) 50 MG 24 hr tablet Take 1 tablet (50 mg) by mouth daily     rivaroxaban ANTICOAGULANT (XARELTO) 20 MG TABS tablet Take 1 tablet (20 mg) by mouth daily (with dinner)     dabigatran ANTICOAGULANT (PRADAXA) 150 MG capsule Take 1 capsule (150 mg) by mouth 2 times daily Store in original 's bottle or blister pack; use within 120 days of  opening.     No current facility-administered medications for this visit.             Review of Systems:     CONSTITUTIONAL: negative for fever, chills, change in weight  INTEGUMENTARY/SKIN: no rash or obvious new lesions  ENT/MOUTH: no sore throat, new sinus pain or nasal drainage  RESP: see interval history  CV: negative for chest pain, palpitations or peripheral edema  GI: no nausea, vomiting, change in stools  : no dysuria  MUSCULOSKELETAL: no myalgias, arthralgias  ENDOCRINE: blood sugars with adequate control  PSYCHIATRIC: mood stable  LYMPHATIC: no new lymphadenopathy  HEME: no bleeding or easy bruisability  NEURO: no numbness, weakness, headaches         Physical Exam:     Constitutional - looks well, in no apparent distress  Eyes - no redness or discharge  Respiratory -breathing appears comfortable.  No cough.  Skin - No appreciable discoloration or lesions (very limited exam)  Neurological - No apparent tremors. Speech fluent and articlate  Psychiatric - no signs of delirium or anxiety     Exam limited to that easily identified on a virtual visit. The rest of a comprehensive physical examination is deferred due to PHE (public health emergency) video visit restrictions.         Current Laboratory Data:   All laboratory and imaging data reviewed.           Previous Cardiology Imaging   No results found for this or any previous visit (from the past 8760 hour(s)).                 Again, thank you for allowing me to participate in the care of your patient.      Sincerely,    Hiram Covarrubias MD

## 2021-02-23 NOTE — PROGRESS NOTES
3  SUBJECTIVE:   CC: Augusto Meeks is an 60 year old male who presents for preventive health visit.     Patient has been advised of split billing requirements and indicates understanding: Yes     Healthy Habits:    Answers for HPI/ROS submitted by the patient on 3/2/2021   Annual Exam:  Frequency of exercise:: None  Getting at least 3 servings of Calcium per day:: NO  Diet:: Regular (no restrictions)  Taking medications regularly:: Yes  Medication side effects:: None  Bi-annual eye exam:: Yes  Dental care twice a year:: NO  Sleep apnea or symptoms of sleep apnea:: Sleep apnea  abdominal pain: No  Blood in stool: Yes  chest pain: No  chills: No  congestion: No  constipation: No  cough: No  diarrhea: No  dizziness: Yes  ear pain: No  eye pain: No  nervous/anxious: No  fever: No  frequency: No  genital sores: No  headaches: Yes  hearing loss: No  heartburn: No  arthralgias: No  joint swelling: No  peripheral edema: No  mood changes: No  myalgias: No  nausea: No  dysuria: No  palpitations: No  Skin sensation changes: No  sore throat: No  urgency: No  rash: No  shortness of breath: Yes  visual disturbance: No  weakness: Yes  impotence: Yes  penile discharge: No  Additional concerns today:: Yes           Today's PHQ-2 Score:   PHQ-2 ( 1999 Pfizer) 3/2/2021 1/22/2020   Q1: Little interest or pleasure in doing things 0 0   Q2: Feeling down, depressed or hopeless 0 0   PHQ-2 Score 0 0   Q1: Little interest or pleasure in doing things Not at all -   Q2: Feeling down, depressed or hopeless Not at all -   PHQ-2 Score 0 -       Abuse: Current or Past(Physical, Sexual or Emotional)- No  Do you feel safe in your environment? Yes        Social History     Tobacco Use     Smoking status: Former Smoker     Packs/day: 0.00     Years: 20.00     Pack years: 0.00     Types: Cigars     Start date: 12/12/1979     Quit date: 3/3/2011     Years since quitting: 10.0     Smokeless tobacco: Never Used     Tobacco comment: occasional cigar    Substance Use Topics     Alcohol use: Yes     Comment: occas      If you drink alcohol do you typically have >3 drinks per day or >7 drinks per week? Yes - AUDIT SCORE:  12  AUDIT - Alcohol Use Disorders Identification Test - Reproduced from the World Health Organization Audit 2001 (Second Edition) 3/2/2021   1.  How often do you have a drink containing alcohol? 2 to 3 times a week   2.  How many drinks containing alcohol do you have on a typical day when you are drinking? 3 or 4   3.  How often do you have five or more drinks on one occasion? Monthly   4.  How often during the last year have you found that you were not able to stop drinking once you had started? Never   5.  How often during the last year have you failed to do what was normally expected of you because of drinking? Never   6.  How often during the last year have you needed a first drink in the morning to get yourself going after a heavy drinking session? Never   7.  How often during the last year have you had a feeling of guilt or remorse after drinking? Less than monthly   8.  How often during the last year have you been unable to remember what happened the night before because of your drinking? Less than monthly   9.  Have you or someone else been injured because of your drinking? No   10. Has a relative, friend, doctor or other health care worker been concerned about your drinking or suggested you cut down? Yes, during the last year   TOTAL SCORE 12                         Last PSA:   PSA   Date Value Ref Range Status   11/15/2019 2.07 0 - 4 ug/L Final     Comment:     Assay Method:  Chemiluminescence using Siemens Vista analyzer       Reviewed orders with patient. Reviewed health maintenance and updated orders accordingly - Yes       Reviewed and updated as needed this visit by clinical staff  Tobacco  Allergies  Meds   Med Hx  Surg Hx  Fam Hx  Soc Hx        Reviewed and updated as needed this visit by Provider                     ROS:  CONSTITUTIONAL: NEGATIVE for fever, chills, change in weight  INTEGUMENTARY/SKIN: NEGATIVE for worrisome rashes, moles or lesions  EYES: NEGATIVE for vision changes or irritation  ENT: NEGATIVE for ear, mouth and throat problems  RESP: NEGATIVE for significant cough or SOB  CV: NEGATIVE for chest pain, palpitations or peripheral edema  GI: NEGATIVE for nausea, abdominal pain, heartburn, or change in bowel habits   male: negative for dysuria, hematuria, decreased urinary stream, erectile dysfunction, urethral discharge  MUSCULOSKELETAL: NEGATIVE for significant arthralgias or myalgia  NEURO: NEGATIVE for weakness, dizziness or paresthesias  PSYCHIATRIC: NEGATIVE for changes in mood or affect    OBJECTIVE:   /82   Pulse 96   Temp 97.6  F (36.4  C) (Tympanic)   Resp 14   Ht 1.829 m (6')   Wt 111.1 kg (245 lb)   SpO2 99%   BMI 33.23 kg/m    EXAM:  GENERAL: healthy, alert and no distress  EYES: Eyes grossly normal to inspection, PERRL and conjunctivae and sclerae normal  HENT: ear canals and TM's normal, nose and mouth without ulcers or lesions  NECK: no adenopathy, no asymmetry, masses, or scars and thyroid normal to palpation  RESP: lungs clear to auscultation - no rales, rhonchi or wheezes  CV: regular rate and rhythm, normal S1 S2, no S3 or S4, no murmur, click or rub, no peripheral edema and peripheral pulses strong  ABDOMEN: soft, nontender, no hepatosplenomegaly, no masses and bowel sounds normal  MS: no gross musculoskeletal defects noted, no edema  SKIN: no suspicious lesions or rashes  NEURO: Normal strength and tone, mentation intact and speech normal  PSYCH: mentation appears normal, affect normal/bright    Diagnostic Test Results:  Labs reviewed in Epic    ASSESSMENT/PLAN:       ICD-10-CM    1. Routine general medical examination at a health care facility  Z00.00    2. Lipid screening  Z13.220 Lipid Profile (Chol, Trig, HDL, LDL calc)       Patient has been advised of split billing  requirements and indicates understanding: Yes  COUNSELING:  Reviewed preventive health counseling, as reflected in patient instructions    Estimated body mass index is 33.23 kg/m  as calculated from the following:    Height as of this encounter: 1.829 m (6').    Weight as of this encounter: 111.1 kg (245 lb).    Weight management plan: excercise and less calories    He reports that he quit smoking about 10 years ago. His smoking use included cigars. He started smoking about 41 years ago. He smoked 0.00 packs per day for 20.00 years. He has never used smokeless tobacco.      Counseling Resources:  ATP IV Guidelines  Pooled Cohorts Equation Calculator  FRAX Risk Assessment  ICSI Preventive Guidelines  Dietary Guidelines for Americans, 2010  USDA's MyPlate  ASA Prophylaxis  Lung CA Screening    Singh Valencia MD  [facility]

## 2021-02-23 NOTE — PROGRESS NOTES
"LUNG NODULE & INTERVENTIONAL PULMONARY CLINIC  CLINICS & SURGERY CENTER, [facility], [facility]   VIDEO VISIT    Augusto Meeks MRN# [id number]   Age: 60 year old YOB: 1960     Reason for Consultation: lung nodule(s)    Requesting Physician: Vanessa Clement MD  [address]  [place] [address]       The patient has been notified of following:   \"This video visit will be conducted via a call between you and your physician/provider. We have found that certain health care needs can be provided without the need for an in-person physical exam.  This service lets us provide the care you need with a video conversation.  If a prescription is necessary we can send it directly to your pharmacy.  If lab work is needed we can place an order for that and you can then stop by our lab to have the test done at a later time.  Video visits are billed at different rates depending on your insurance coverage.  Please reach out to your insurance provider with any questions.  If during the course of the call the physician/provider feels a video visit is not appropriate, you will not be charged for this service.\"  Patient has given verbal consent for Video visit? Yes  How would you like to obtain your AVS? Please refer to rooming staff note  Patient would like the video invitation sent by: Please refer to rooming staff note   Will anyone else be joining your video visit? Please refer to rooming staff note       Video-Visit Details     Tele 25min     Hiram Covarrubias MD      Assessment and Plan:    1. New multiple pulmonary lung nodule(s). Given the characteristics on current/previous imaging and risk factors; I would classify this to be Intermediate (6-65%) risk for cancer. Multiple bilateral sub6mm ggo's. Cont Gelacio, gm CT in 6mo       Billing: I spent 30 minutes including face to face, chart review, personal and documented interpretation of results, counseling and coordination " of care about the issues above.    Hiram Covarrubias MD   of Medicine  Interventional Pulmonology  Department of Pulmonary, Allergy, Critical Care and Sleep Medicine   [facility]  Pager: [phone]          History:     Augusto Meeks is a 60 year old male with sig h/o for afib, GABRIEL who is here for evaluation/followup of lung nodule(s).    - No new resp sx or complaints. Denies dyspnea or cough.   - here for eval of nodules  - Personal hx of cancer: na  - Family hx of cancer: na  - Exposure hx: Denies asbestos or radon exposure   - Tobacco hx: Past Smoker: 1ppd for 30years. Quit 10yrs ago. Has an occasional cigar.  - My interpretation of the images relevant for this visit includes: ggo   - My interpretation of the PFT's relevant for this visit includes: None     Culprit Nodule(s):   1. sub6mm ggo bilaterally. First seen 2/18/21.     Other active medical problems include:   - has afib. Stable on BB and anticoagulation   - has GABRIEL. On CPAP           Past Medical History:      Past Medical History:   Diagnosis Date     A-fib (H) 2019     Arrhythmia      Irregular heart beat      Sleep apnea              Past Surgical History:      Past Surgical History:   Procedure Laterality Date     ANESTHESIA CARDIOVERSION N/A 9/25/2019    Procedure: Anesthesia Coverage Transesophageal Echocardiogram, Cardioversion @0930;  Surgeon: GENERIC ANESTHESIA PROVIDER;  Location: [facility]     ANESTHESIA CARDIOVERSION N/A 10/30/2019    Procedure: ANESTHESIA, FOR CARDIOVERSION @1100;  Surgeon: GENERIC ANESTHESIA PROVIDER;  Location: [facility]     ANESTHESIA CARDIOVERSION N/A 12/5/2019    Procedure: CARDIOVERSION;  Surgeon: GENERIC ANESTHESIA PROVIDER;  Location: [facility] OR     CARDIOVERSION  10/30/2019    Patient reported he had a cardioverson on 09/25/2019.     EP ABLATION FOCAL AFIB N/A 1/22/2021    Procedure: EP ABLATION FOCAL AFIB;  Surgeon: Vanessa Clement MD;  Location:  HEART CARDIAC CATH LAB     VASECTOMY               Social History:     Social History     Tobacco Use     Smoking status: Former Smoker     Years: 20.00     Types: Cigars     Smokeless tobacco: Never Used     Tobacco comment: occasional cigar   Substance Use Topics     Alcohol use: Yes     Comment: occas             Family History:     Family History   Problem Relation Age of Onset     Lung Cancer Mother      Cancer Father              Allergies:      Allergies   Allergen Reactions     Lisinopril Cough            Medications:     Current Outpatient Medications   Medication Sig     amLODIPine (NORVASC) 5 MG tablet Take 1 tablet (5 mg) by mouth daily     metoprolol succinate ER (TOPROL-XL) 50 MG 24 hr tablet Take 1 tablet (50 mg) by mouth daily     rivaroxaban ANTICOAGULANT (XARELTO) 20 MG TABS tablet Take 1 tablet (20 mg) by mouth daily (with dinner)     dabigatran ANTICOAGULANT (PRADAXA) 150 MG capsule Take 1 capsule (150 mg) by mouth 2 times daily Store in original 's bottle or blister pack; use within 120 days of opening.     No current facility-administered medications for this visit.             Review of Systems:     CONSTITUTIONAL: negative for fever, chills, change in weight  INTEGUMENTARY/SKIN: no rash or obvious new lesions  ENT/MOUTH: no sore throat, new sinus pain or nasal drainage  RESP: see interval history  CV: negative for chest pain, palpitations or peripheral edema  GI: no nausea, vomiting, change in stools  : no dysuria  MUSCULOSKELETAL: no myalgias, arthralgias  ENDOCRINE: blood sugars with adequate control  PSYCHIATRIC: mood stable  LYMPHATIC: no new lymphadenopathy  HEME: no bleeding or easy bruisability  NEURO: no numbness, weakness, headaches         Physical Exam:     Constitutional - looks well, in no apparent distress  Eyes - no redness or discharge  Respiratory -breathing appears comfortable.  No cough.  Skin - No appreciable discoloration or lesions (very limited exam)  Neurological - No apparent tremors. Speech  fluent and articlate  Psychiatric - no signs of delirium or anxiety     Exam limited to that easily identified on a virtual visit. The rest of a comprehensive physical examination is deferred due to PHE (public health emergency) video visit restrictions.         Current Laboratory Data:   All laboratory and imaging data reviewed.           Previous Cardiology Imaging   No results found for this or any previous visit (from the past 8760 hour(s)).

## 2021-03-02 ASSESSMENT — ENCOUNTER SYMPTOMS
FREQUENCY: 0
EYE PAIN: 0
CONSTIPATION: 0
COUGH: 0
MYALGIAS: 0
CHILLS: 0
WEAKNESS: 1
NAUSEA: 0
JOINT SWELLING: 0
HEADACHES: 1
ABDOMINAL PAIN: 0
PARESTHESIAS: 0
HEMATOCHEZIA: 1
DYSURIA: 0
SHORTNESS OF BREATH: 1
DIZZINESS: 1
HEARTBURN: 0
DIARRHEA: 0
PALPITATIONS: 0
FEVER: 0
SORE THROAT: 0
ARTHRALGIAS: 0
NERVOUS/ANXIOUS: 0

## 2021-03-03 ENCOUNTER — MYC MEDICAL ADVICE (OUTPATIENT)
Dept: FAMILY MEDICINE | Facility: CLINIC | Age: 61
End: 2021-03-03

## 2021-03-03 ENCOUNTER — OFFICE VISIT (OUTPATIENT)
Dept: FAMILY MEDICINE | Facility: CLINIC | Age: 61
End: 2021-03-03
Payer: COMMERCIAL

## 2021-03-03 VITALS
HEIGHT: 72 IN | WEIGHT: 245 LBS | OXYGEN SATURATION: 99 % | SYSTOLIC BLOOD PRESSURE: 124 MMHG | HEART RATE: 96 BPM | DIASTOLIC BLOOD PRESSURE: 82 MMHG | TEMPERATURE: 97.6 F | RESPIRATION RATE: 14 BRPM | BODY MASS INDEX: 33.18 KG/M2

## 2021-03-03 DIAGNOSIS — Z00.00 ROUTINE GENERAL MEDICAL EXAMINATION AT A HEALTH CARE FACILITY: Primary | ICD-10-CM

## 2021-03-03 DIAGNOSIS — N52.9 ERECTILE DYSFUNCTION, UNSPECIFIED ERECTILE DYSFUNCTION TYPE: Primary | ICD-10-CM

## 2021-03-03 DIAGNOSIS — Z13.220 LIPID SCREENING: ICD-10-CM

## 2021-03-03 LAB
CHOLEST SERPL-MCNC: 200 MG/DL
HDLC SERPL-MCNC: 48 MG/DL
LDLC SERPL CALC-MCNC: 134 MG/DL
NONHDLC SERPL-MCNC: 152 MG/DL
TRIGL SERPL-MCNC: 91 MG/DL

## 2021-03-03 PROCEDURE — 99396 PREV VISIT EST AGE 40-64: CPT | Performed by: FAMILY MEDICINE

## 2021-03-03 PROCEDURE — 80061 LIPID PANEL: CPT | Performed by: FAMILY MEDICINE

## 2021-03-03 PROCEDURE — 36415 COLL VENOUS BLD VENIPUNCTURE: CPT | Performed by: FAMILY MEDICINE

## 2021-03-03 RX ORDER — SILDENAFIL CITRATE 20 MG/1
TABLET ORAL
Qty: 20 TABLET | Refills: 1 | Status: SHIPPED | OUTPATIENT
Start: 2021-03-03 | End: 2021-08-17

## 2021-03-03 ASSESSMENT — MIFFLIN-ST. JEOR: SCORE: 1964.31

## 2021-03-04 ENCOUNTER — MYC MEDICAL ADVICE (OUTPATIENT)
Dept: FAMILY MEDICINE | Facility: CLINIC | Age: 61
End: 2021-03-04

## 2021-03-30 ENCOUNTER — IMMUNIZATION (OUTPATIENT)
Dept: NURSING | Facility: CLINIC | Age: 61
End: 2021-03-30
Payer: COMMERCIAL

## 2021-03-30 PROCEDURE — 0001A PR COVID VAC PFIZER DIL RECON 30 MCG/0.3 ML IM: CPT

## 2021-03-30 PROCEDURE — 91300 PR COVID VAC PFIZER DIL RECON 30 MCG/0.3 ML IM: CPT

## 2021-04-07 ENCOUNTER — MYC MEDICAL ADVICE (OUTPATIENT)
Dept: CARDIOLOGY | Facility: CLINIC | Age: 61
End: 2021-04-07

## 2021-04-07 DIAGNOSIS — I10 BENIGN ESSENTIAL HYPERTENSION: ICD-10-CM

## 2021-04-07 RX ORDER — METOPROLOL SUCCINATE 50 MG/1
50 TABLET, EXTENDED RELEASE ORAL DAILY
Qty: 90 TABLET | Refills: 1 | Status: SHIPPED | OUTPATIENT
Start: 2021-04-07 | End: 2021-12-15

## 2021-04-13 ENCOUNTER — MYC MEDICAL ADVICE (OUTPATIENT)
Dept: CARDIOLOGY | Facility: CLINIC | Age: 61
End: 2021-04-13

## 2021-04-14 ENCOUNTER — MEDICAL CORRESPONDENCE (OUTPATIENT)
Dept: HEALTH INFORMATION MANAGEMENT | Facility: CLINIC | Age: 61
End: 2021-04-14
Payer: COMMERCIAL

## 2021-04-20 ENCOUNTER — IMMUNIZATION (OUTPATIENT)
Dept: NURSING | Facility: CLINIC | Age: 61
End: 2021-04-20
Attending: INTERNAL MEDICINE
Payer: COMMERCIAL

## 2021-04-20 PROCEDURE — 91300 PR COVID VAC PFIZER DIL RECON 30 MCG/0.3 ML IM: CPT

## 2021-04-20 PROCEDURE — 0002A PR COVID VAC PFIZER DIL RECON 30 MCG/0.3 ML IM: CPT

## 2021-05-03 ENCOUNTER — MYC MEDICAL ADVICE (OUTPATIENT)
Dept: CARDIOLOGY | Facility: CLINIC | Age: 61
End: 2021-05-03

## 2021-05-03 ENCOUNTER — VIRTUAL VISIT (OUTPATIENT)
Dept: CARDIOLOGY | Facility: CLINIC | Age: 61
End: 2021-05-03
Payer: COMMERCIAL

## 2021-05-03 DIAGNOSIS — Z86.79 S/P ABLATION OF ATRIAL FIBRILLATION: ICD-10-CM

## 2021-05-03 DIAGNOSIS — I48.19 PERSISTENT ATRIAL FIBRILLATION (H): Primary | ICD-10-CM

## 2021-05-03 DIAGNOSIS — Z98.890 S/P ABLATION OF ATRIAL FIBRILLATION: ICD-10-CM

## 2021-05-03 PROCEDURE — 99214 OFFICE O/P EST MOD 30 MIN: CPT | Mod: 95 | Performed by: INTERNAL MEDICINE

## 2021-05-03 RX ORDER — POTASSIUM CHLORIDE 1500 MG/1
20 TABLET, EXTENDED RELEASE ORAL
Status: CANCELLED | OUTPATIENT
Start: 2021-05-03

## 2021-05-03 RX ORDER — LIDOCAINE 40 MG/G
CREAM TOPICAL
Status: CANCELLED | OUTPATIENT
Start: 2021-05-03

## 2021-05-03 RX ORDER — MAGNESIUM SULFATE HEPTAHYDRATE 40 MG/ML
2 INJECTION, SOLUTION INTRAVENOUS
Status: CANCELLED | OUTPATIENT
Start: 2021-05-03

## 2021-05-03 RX ORDER — POTASSIUM CHLORIDE 1500 MG/1
40 TABLET, EXTENDED RELEASE ORAL
Status: CANCELLED | OUTPATIENT
Start: 2021-05-03

## 2021-05-03 NOTE — NURSING NOTE
Cardiac Testing: Patient given instructions regarding  echocardiogram . Discussed purpose, preparation, procedure and when to expect results reported back to the patient. Patient demonstrated understanding of this information and agreed to call with further questions or concerns.  NOW  Med Reconcile: Reviewed and verified all current medications with the patient. The updated medication list was printed and given to the patient.  New Medication: Patient was educated regarding newly prescribed medication, including discussion of  the indication, administration, side effects, and when to report to MD or RN. Patient demonstrated understanding of this information and agreed to call with further questions or concerns.  multaq 400 mg twice daily  Return Appointment: Patient given instructions regarding scheduling next clinic visit. Patient demonstrated understanding of this information and agreed to call with further questions or concerns.  1 mo in clinic s/p DCC  EP Procedure: Patient given instructions regarding  cardioversion Discussed purpose, preparation, and procedure with the patient. Patient demonstrated understanding of this information and agreed to call with further questions or concerns.  Patient does not need a DOT prior d/t take Xarelto compliant  Patient stated he understood all health information given and agreed to call with further questions or concerns.-VISIT WAS VIRTUAL.  MESSAGE LEFT TO CALL PATIENT

## 2021-05-03 NOTE — PROGRESS NOTES
Chandu is a 60 year old who is being evaluated via a billable video visit.      How would you like to obtain your AVS? Madie  If the video visit is dropped, the invitation should be resent by: Other e-mail: Madie  Will anyone else be joining your video visit? No    Video Start Time: 9 AM  Video-Visit Details    Type of service:  Video Visit    Video End Time: 9:19 AM    Originating Location (pt. Location): Home    Distant Location (provider location):  [facility]ART [facility] [employer]     Platform used for Video Visit: Roomorama     HPI: Purpose of visit: Follow-up after ablation for atrial fibrillation    Mr. Augusto Meeks is a 60-year-old gentleman with a medical history significant for hypertension, obesity and persistent atrial fibrillation.      Patient underwent circumferential pulmonary vein isolation for atrial fibrillation in January 2021.  Patient experienced relief from his symptoms of fatigue and poor stamina for several days and after that reverted back to atrial fibrillation.  In the last few months, patient has felt that his easy fatigability and poor stamina hve worsened.    Patient is currently taking Xarelto for stroke prophylaxis and metoprolol succinate 50 mg once daily.    Patient did not report any symptoms of irregular heartbeat sensation, palpitations, exertional angina, frequent lightheadedness, presyncope or syncope.          PAST MEDICAL HISTORY:  Past Medical History:   Diagnosis Date     A-fib (H) 2019     Arrhythmia      Hypertension 1/20     Irregular heart beat      Sleep apnea        CURRENT MEDICATIONS:  Current Outpatient Medications   Medication Sig Dispense Refill     amLODIPine (NORVASC) 5 MG tablet Take 1 tablet (5 mg) by mouth daily 90 tablet 0     dronedarone (MULTAQ) 400 MG TABS tablet Take 1 tablet (400 mg) by mouth 2 times daily (with meals) 60 tablet 6     metoprolol succinate ER (TOPROL-XL) 50 MG 24 hr tablet Take 1 tablet (50 mg) by mouth daily 90 tablet 1      rivaroxaban ANTICOAGULANT (XARELTO) 20 MG TABS tablet Take 1 tablet (20 mg) by mouth daily (with dinner) 90 tablet 3     sildenafil (REVATIO) 20 MG tablet Take 1-5 tablets one hour before intercourse. 20 tablet 1       PAST SURGICAL HISTORY:  Past Surgical History:   Procedure Laterality Date     ANESTHESIA CARDIOVERSION N/A 9/25/2019    Procedure: Anesthesia Coverage Transesophageal Echocardiogram, Cardioversion @0930;  Surgeon: GENERIC ANESTHESIA PROVIDER;  Location:  OR     ANESTHESIA CARDIOVERSION N/A 10/30/2019    Procedure: ANESTHESIA, FOR CARDIOVERSION @1100;  Surgeon: GENERIC ANESTHESIA PROVIDER;  Location: [facility] OR     ANESTHESIA CARDIOVERSION N/A 12/5/2019    Procedure: CARDIOVERSION;  Surgeon: GENERIC ANESTHESIA PROVIDER;  Location:  OR     CARDIAC SURGERY  2/22/21    Ablaution     CARDIOVERSION  10/30/2019    Patient reported he had a cardioverson on 09/25/2019.     EP ABLATION FOCAL AFIB N/A 1/22/2021    Procedure: EP ABLATION FOCAL AFIB;  Surgeon: Vanessa Clement MD;  Location:  HEART CARDIAC CATH LAB     VASECTOMY         ALLERGIES:     Allergies   Allergen Reactions     Lisinopril Cough       FAMILY HISTORY:  - Premature coronary artery disease  - Atrial fibrillation  - Sudden cardiac death     SOCIAL HISTORY:  Social History     Tobacco Use     Smoking status: Former Smoker     Packs/day: 0.00     Years: 20.00     Pack years: 0.00     Types: Cigars     Start date: 12/12/1979     Quit date: 3/3/2011     Years since quitting: 10.1     Smokeless tobacco: Never Used     Tobacco comment: occasional cigar   Substance Use Topics     Alcohol use: Yes     Comment: occas      Drug use: Yes     Types: Marijuana     Comment: occas        ROS:   Constitutional: No fever, chills, or sweats. Weight stable.   ENT: No visual disturbance, ear ache, epistaxis, sore throat.   Cardiovascular: As per HPI.   Respiratory: No cough, hemoptysis.    GI: No nausea, vomiting, hematemesis, melena, or hematochezia.   : No  hematuria.   Integument: Negative.   Psychiatric: Negative.   Hematologic:  Easy bruising, no easy bleeding.  Neuro: Negative.   Endocrinology: No significant heat or cold intolerance   Musculoskeletal: No myalgia.    Exam:  There were no vitals taken for this visit.  GENERAL APPEARANCE: healthy, alert and no distress    Labs:  CBC RESULTS:   Lab Results   Component Value Date    WBC 5.3 01/22/2021    RBC 3.88 (L) 01/22/2021    HGB 12.9 (L) 01/22/2021    HCT 37.3 (L) 01/22/2021    MCV 96 01/22/2021    MCH 33.2 (H) 01/22/2021    MCHC 34.6 01/22/2021    RDW 14.9 01/22/2021     (L) 01/22/2021       BMP RESULTS:  Lab Results   Component Value Date     01/22/2021    POTASSIUM 4.3 01/22/2021    CHLORIDE 108 01/22/2021    CO2 25 01/22/2021    ANIONGAP 6 01/22/2021     (H) 01/22/2021    BUN 13 01/22/2021    CR 0.82 01/22/2021    GFRESTIMATED >90 01/22/2021    GFRESTBLACK >90 01/22/2021    CHALINO 8.7 01/22/2021        INR RESULTS:  Lab Results   Component Value Date    INR 1.39 (H) 09/25/2019       Procedures:      Assessment and Plan:     Recurrent atrial fibrillation after ablation for persistent atrial fibrillation    I discussed extensively with patient his complex medical situation.  Patient continues to experience easy fatigability and poor stamina after the ablation.  We will obtain a transthoracic echocardiogram to define the structure and function of the heart.  We will start dronedarone 400 mg twice daily and after about 1 to 2 weeks patient will undergo a cardioversion.  Patient will not need DOT because he has been compliant with his Xarelto.    I will see patient again in person clinic a few weeks after the electrical cardioversion.  All questions and concerns were addressed and patient is happy with the plan.      CC  Patient Care Team:  Louann Ramirez APRN CNP as PCP - General (Nurse Practitioner - Family)  Louann Ramirez APRN CNP as Assigned PCP  Vanessa Clement MD as Assigned Heart and  Vascular Provider  Lacy New MD as Assigned Neuroscience Provider  Hiram Covarrubias MD as MD (Critical Care)  Vanessa Clement MD as Referring Physician (Clinical Cardiac Electrophysiology)  Hiram Covarrubias MD as Assigned Pulmonology Provider

## 2021-05-03 NOTE — CONFIDENTIAL NOTE
LEFT MESSAGE FOR PATIENT TO CALL BACK TO SCHEDULE ECHO AND DISCUSS CARDIOVERSION.     Beronica Montoya RN  Cardiology Care Coordinator  [facility]  [phone] option 1         Cardiac Testing: Patient given instructions regarding  echocardiogram . Discussed purpose, preparation, procedure and when to expect results reported back to the patient. Patient demonstrated understanding of this information and agreed to call with further questions or concerns.  NOW  Med Reconcile: Reviewed and verified all current medications with the patient. The updated medication list was printed and given to the patient.  New Medication: Patient was educated regarding newly prescribed medication, including discussion of  the indication, administration, side effects, and when to report to MD or RN. Patient demonstrated understanding of this information and agreed to call with further questions or concerns.  multaq 400 mg twice daily  Return Appointment: Patient given instructions regarding scheduling next clinic visit. Patient demonstrated understanding of this information and agreed to call with further questions or concerns.  1 mo in clinic s/p DCC  EP Procedure: Patient given instructions regarding  cardioversion Discussed purpose, preparation, and procedure with the patient. Patient demonstrated understanding of this information and agreed to call with further questions or concerns.  Patient does not need a DOT prior d/t take Xarelto compliant  Patient stated he understood all health information given and agreed to call with further questions or concerns.-VISIT WAS VIRTUAL.  MESSAGE LEFT TO CALL PATIENT

## 2021-05-03 NOTE — PATIENT INSTRUCTIONS
Thank you for coming to the [facility]; please note the following instructions:    1. Echocardiogram now    2.  START Multq 400 mg twice daily.  This medication is covered at a $0 copay by your insurance.    3.  Cardioversion in 1-2 weeks after starting Multaq    4.  Follow up 1 month after cardioversion in clinic        If you have any questions regarding your visit please contact your care team:     Cardiology  Telephone Number   Beronica HINKLE, RN  Delisa TIRADO, RN   Lyric PARRISH, RMKATIA SOLIS, MAYRA TRAN, LPN   [phone] (option 1)   For scheduling appts:     [phone] (select option 1)       For the Device Clinic (Pacemakers and ICD's)  RN's :  Arleth Scanlon   During business hours: [phone]    *After business hours:  [phone] (select option 4)      Normal test result notifications will be released via [employer] or mailed within 7 business days.  All other test results, will be communicated via telephone once reviewed by your cardiologist.    If you need a medication refill please contact your pharmacy.  Please allow 3 business days for your refill to be completed.    As always, thank you for trusting us with your health care needs!

## 2021-05-03 NOTE — TELEPHONE ENCOUNTER
[facility] Call Center    Phone Message    May a detailed message be left on voicemail: yes     Reason for Call: Other: Pt returning a missed call from Beronica. Please call back to discuss.     Action Taken: Message routed to:  [facility] cardio    Travel Screening: Not Applicable

## 2021-05-03 NOTE — LETTER
5/3/2021      RE: Augusto Meeks  [address] [facility] [address]       Dear Colleague,    Thank you for the opportunity to participate in the care of your patient, Augusto Meeks, at the [facility] HEART [facility]. Please see a copy of my visit note below.    Chandu is a 60 year old who is being evaluated via a billable video visit.      How would you like to obtain your AVS? [employer]  If the video visit is dropped, the invitation should be resent by: Other e-mail: [employer]  Will anyone else be joining your video visit? No    Video Start Time: 9 AM  Video-Visit Details    Type of service:  Video Visit    Video End Time: 9:19 AM    Originating Location (pt. Location): Home    Distant Location (provider location):  [facility]     Platform used for Video Visit: BeanStockd     HPI: Purpose of visit: Follow-up after ablation for atrial fibrillation    Mr. Augusto Meeks is a 60-year-old gentleman with a medical history significant for hypertension, obesity and persistent atrial fibrillation.      Patient underwent circumferential pulmonary vein isolation for atrial fibrillation in January 2021.  Patient experienced relief from his symptoms of fatigue and poor stamina for several days and after that reverted back to atrial fibrillation.  In the last few months, patient has felt that his easy fatigability and poor stamina hve worsened.    Patient is currently taking Xarelto for stroke prophylaxis and metoprolol succinate 50 mg once daily.    Patient did not report any symptoms of irregular heartbeat sensation, palpitations, exertional angina, frequent lightheadedness, presyncope or syncope.          PAST MEDICAL HISTORY:  Past Medical History:   Diagnosis Date     A-fib (H) 2019     Arrhythmia      Hypertension 1/20     Irregular heart beat      Sleep apnea        CURRENT MEDICATIONS:  Current Outpatient Medications    Medication Sig Dispense Refill     amLODIPine (NORVASC) 5 MG tablet Take 1 tablet (5 mg) by mouth daily 90 tablet 0     dronedarone (MULTAQ) 400 MG TABS tablet Take 1 tablet (400 mg) by mouth 2 times daily (with meals) 60 tablet 6     metoprolol succinate ER (TOPROL-XL) 50 MG 24 hr tablet Take 1 tablet (50 mg) by mouth daily 90 tablet 1     rivaroxaban ANTICOAGULANT (XARELTO) 20 MG TABS tablet Take 1 tablet (20 mg) by mouth daily (with dinner) 90 tablet 3     sildenafil (REVATIO) 20 MG tablet Take 1-5 tablets one hour before intercourse. 20 tablet 1       PAST SURGICAL HISTORY:  Past Surgical History:   Procedure Laterality Date     ANESTHESIA CARDIOVERSION N/A 9/25/2019    Procedure: Anesthesia Coverage Transesophageal Echocardiogram, Cardioversion @0930;  Surgeon: GENERIC ANESTHESIA PROVIDER;  Location:  OR     ANESTHESIA CARDIOVERSION N/A 10/30/2019    Procedure: ANESTHESIA, FOR CARDIOVERSION @1100;  Surgeon: GENERIC ANESTHESIA PROVIDER;  Location:  OR     ANESTHESIA CARDIOVERSION N/A 12/5/2019    Procedure: CARDIOVERSION;  Surgeon: GENERIC ANESTHESIA PROVIDER;  Location:  OR     CARDIAC SURGERY  2/22/21    Ablaution     CARDIOVERSION  10/30/2019    Patient reported he had a cardioverson on 09/25/2019.     EP ABLATION FOCAL AFIB N/A 1/22/2021    Procedure: EP ABLATION FOCAL AFIB;  Surgeon: Vanessa Clement MD;  Location:  HEART CARDIAC CATH LAB     VASECTOMY         ALLERGIES:     Allergies   Allergen Reactions     Lisinopril Cough       FAMILY HISTORY:  - Premature coronary artery disease  - Atrial fibrillation  - Sudden cardiac death     SOCIAL HISTORY:  Social History     Tobacco Use     Smoking status: Former Smoker     Packs/day: 0.00     Years: 20.00     Pack years: 0.00     Types: Cigars     Start date: 12/12/1979     Quit date: 3/3/2011     Years since quitting: 10.1     Smokeless tobacco: Never Used     Tobacco comment: occasional cigar   Substance Use Topics     Alcohol use: Yes     Comment:  occas      Drug use: Yes     Types: Marijuana     Comment: occas        ROS:   Constitutional: No fever, chills, or sweats. Weight stable.   ENT: No visual disturbance, ear ache, epistaxis, sore throat.   Cardiovascular: As per HPI.   Respiratory: No cough, hemoptysis.    GI: No nausea, vomiting, hematemesis, melena, or hematochezia.   : No hematuria.   Integument: Negative.   Psychiatric: Negative.   Hematologic:  Easy bruising, no easy bleeding.  Neuro: Negative.   Endocrinology: No significant heat or cold intolerance   Musculoskeletal: No myalgia.    Exam:  There were no vitals taken for this visit.  GENERAL APPEARANCE: healthy, alert and no distress    Labs:  CBC RESULTS:   Lab Results   Component Value Date    WBC 5.3 01/22/2021    RBC 3.88 (L) 01/22/2021    HGB 12.9 (L) 01/22/2021    HCT 37.3 (L) 01/22/2021    MCV 96 01/22/2021    MCH 33.2 (H) 01/22/2021    MCHC 34.6 01/22/2021    RDW 14.9 01/22/2021     (L) 01/22/2021       BMP RESULTS:  Lab Results   Component Value Date     01/22/2021    POTASSIUM 4.3 01/22/2021    CHLORIDE 108 01/22/2021    CO2 25 01/22/2021    ANIONGAP 6 01/22/2021     (H) 01/22/2021    BUN 13 01/22/2021    CR 0.82 01/22/2021    GFRESTIMATED >90 01/22/2021    GFRESTBLACK >90 01/22/2021    CHALINO 8.7 01/22/2021        INR RESULTS:  Lab Results   Component Value Date    INR 1.39 (H) 09/25/2019       Procedures:      Assessment and Plan:     Recurrent atrial fibrillation after ablation for persistent atrial fibrillation    I discussed extensively with patient his complex medical situation.  Patient continues to experience easy fatigability and poor stamina after the ablation.  We will obtain a transthoracic echocardiogram to define the structure and function of the heart.  We will start dronedarone 400 mg twice daily and after about 1 to 2 weeks patient will undergo a cardioversion.  Patient will not need DOT because he has been compliant with his Xarelto.    I will see  patient again in person clinic a few weeks after the electrical cardioversion.  All questions and concerns were addressed and patient is happy with the plan.        Please do not hesitate to contact me if you have any questions/concerns.     Sincerely,     Vanessa Clement MD      CC  Patient Care Team:  Louann Ramirez, JELANI JUAN as PCP - General (Nurse Practitioner - Family)  Lacy New MD as Assigned Neuroscience Provider  Hiram Covarrubias MD as MD (Critical Care)

## 2021-05-05 NOTE — CONFIDENTIAL NOTE
Spoke to patient.  Echo scheduled for this Friday 5/7.  He is picking up multaq today and starting it this evening.  Patient would prefer cardioversion on 21st or 28th.  Writer sent a message to RALEIGH Vega  to contact patient regarding scheduling.    Beronica Montoya RN  Cardiology Care Coordinator  [facility] Heart [facility]  [phone] option 1

## 2021-05-07 ENCOUNTER — ANCILLARY PROCEDURE (OUTPATIENT)
Dept: CARDIOLOGY | Facility: CLINIC | Age: 61
End: 2021-05-07
Attending: INTERNAL MEDICINE
Payer: COMMERCIAL

## 2021-05-07 DIAGNOSIS — I48.19 PERSISTENT ATRIAL FIBRILLATION (H): ICD-10-CM

## 2021-05-07 PROCEDURE — 93306 TTE W/DOPPLER COMPLETE: CPT | Performed by: INTERNAL MEDICINE

## 2021-05-08 DIAGNOSIS — Z11.59 ENCOUNTER FOR SCREENING FOR OTHER VIRAL DISEASES: ICD-10-CM

## 2021-05-16 ENCOUNTER — MYC REFILL (OUTPATIENT)
Dept: CARDIOLOGY | Facility: CLINIC | Age: 61
End: 2021-05-16

## 2021-05-16 ENCOUNTER — MYC MEDICAL ADVICE (OUTPATIENT)
Dept: CARDIOLOGY | Facility: CLINIC | Age: 61
End: 2021-05-16

## 2021-05-16 DIAGNOSIS — I48.0 PAROXYSMAL ATRIAL FIBRILLATION (H): Primary | ICD-10-CM

## 2021-05-16 DIAGNOSIS — I10 BENIGN ESSENTIAL HYPERTENSION: ICD-10-CM

## 2021-05-17 DIAGNOSIS — Z11.59 ENCOUNTER FOR SCREENING FOR OTHER VIRAL DISEASES: ICD-10-CM

## 2021-05-17 LAB
SARS-COV-2 RNA RESP QL NAA+PROBE: NORMAL
SPECIMEN SOURCE: NORMAL

## 2021-05-17 PROCEDURE — U0003 INFECTIOUS AGENT DETECTION BY NUCLEIC ACID (DNA OR RNA); SEVERE ACUTE RESPIRATORY SYNDROME CORONAVIRUS 2 (SARS-COV-2) (CORONAVIRUS DISEASE [COVID-19]), AMPLIFIED PROBE TECHNIQUE, MAKING USE OF HIGH THROUGHPUT TECHNOLOGIES AS DESCRIBED BY CMS-2020-01-R: HCPCS | Performed by: INTERNAL MEDICINE

## 2021-05-17 PROCEDURE — U0005 INFEC AGEN DETEC AMPLI PROBE: HCPCS | Performed by: INTERNAL MEDICINE

## 2021-05-17 RX ORDER — AMLODIPINE BESYLATE 5 MG/1
5 TABLET ORAL DAILY
Qty: 90 TABLET | Refills: 3 | Status: SHIPPED | OUTPATIENT
Start: 2021-05-17 | End: 2021-11-11

## 2021-05-17 NOTE — CONFIDENTIAL NOTE
Signed Prescriptions:                        Disp   Refills    amLODIPine (NORVASC) 5 MG tablet           90 tab*3        Sig: Take 1 tablet (5 mg) by mouth daily  Authorizing Provider: ALIDA MENJIVAR  Ordering User: CHUY RODRIGUEZ Comprehensive Metabolic Panel:  Sodium   Date Value Ref Range Status   01/22/2021 139 133 - 144 mmol/L Final     Potassium   Date Value Ref Range Status   01/22/2021 4.3 3.4 - 5.3 mmol/L Final     Chloride   Date Value Ref Range Status   01/22/2021 108 94 - 109 mmol/L Final     Carbon Dioxide   Date Value Ref Range Status   01/22/2021 25 20 - 32 mmol/L Final     Anion Gap   Date Value Ref Range Status   01/22/2021 6 3 - 14 mmol/L Final     Glucose   Date Value Ref Range Status   01/22/2021 114 (H) 70 - 99 mg/dL Final     Urea Nitrogen   Date Value Ref Range Status   01/22/2021 13 7 - 30 mg/dL Final     Creatinine   Date Value Ref Range Status   01/22/2021 0.82 0.66 - 1.25 mg/dL Final     GFR Estimate   Date Value Ref Range Status   01/22/2021 >90 >60 mL/min/[1.73_m2] Final     Comment:     Non  GFR Calc  Starting 12/18/2018, serum creatinine based estimated GFR (eGFR) will be   calculated using the Chronic Kidney Disease Epidemiology Collaboration   (CKD-EPI) equation.       Calcium   Date Value Ref Range Status   01/22/2021 8.7 8.5 - 10.1 mg/dL Final

## 2021-05-18 LAB
LABORATORY COMMENT REPORT: NORMAL
SARS-COV-2 RNA RESP QL NAA+PROBE: NEGATIVE
SPECIMEN SOURCE: NORMAL

## 2021-05-21 ENCOUNTER — APPOINTMENT (OUTPATIENT)
Dept: LAB | Facility: CLINIC | Age: 61
End: 2021-05-21
Attending: INTERNAL MEDICINE
Payer: COMMERCIAL

## 2021-05-21 ENCOUNTER — ANESTHESIA EVENT (OUTPATIENT)
Dept: SURGERY | Facility: CLINIC | Age: 61
End: 2021-05-21
Payer: COMMERCIAL

## 2021-05-21 ENCOUNTER — APPOINTMENT (OUTPATIENT)
Dept: MEDSURG UNIT | Facility: CLINIC | Age: 61
End: 2021-05-21
Attending: INTERNAL MEDICINE
Payer: COMMERCIAL

## 2021-05-21 ENCOUNTER — HOSPITAL ENCOUNTER (OUTPATIENT)
Dept: CARDIOLOGY | Facility: CLINIC | Age: 61
End: 2021-05-21
Attending: NURSE PRACTITIONER | Admitting: INTERNAL MEDICINE
Payer: COMMERCIAL

## 2021-05-21 ENCOUNTER — ANESTHESIA (OUTPATIENT)
Dept: SURGERY | Facility: CLINIC | Age: 61
End: 2021-05-21
Payer: COMMERCIAL

## 2021-05-21 ENCOUNTER — HOSPITAL ENCOUNTER (OUTPATIENT)
Facility: CLINIC | Age: 61
Discharge: HOME | End: 2021-05-21
Attending: INTERNAL MEDICINE | Admitting: INTERNAL MEDICINE
Payer: COMMERCIAL

## 2021-05-21 VITALS
OXYGEN SATURATION: 96 % | SYSTOLIC BLOOD PRESSURE: 128 MMHG | HEART RATE: 51 BPM | RESPIRATION RATE: 16 BRPM | DIASTOLIC BLOOD PRESSURE: 65 MMHG

## 2021-05-21 VITALS
HEART RATE: 69 BPM | RESPIRATION RATE: 16 BRPM | DIASTOLIC BLOOD PRESSURE: 86 MMHG | OXYGEN SATURATION: 97 % | SYSTOLIC BLOOD PRESSURE: 119 MMHG

## 2021-05-21 LAB
MAGNESIUM SERPL-MCNC: 2.2 MG/DL (ref 1.6–2.3)
POTASSIUM SERPL-SCNC: 4.3 MMOL/L (ref 3.4–5.3)

## 2021-05-21 PROCEDURE — 84132 ASSAY OF SERUM POTASSIUM: CPT | Performed by: INTERNAL MEDICINE

## 2021-05-21 PROCEDURE — 999N000054 HC STATISTIC EKG NON-CHARGEABLE

## 2021-05-21 PROCEDURE — 92960 CARDIOVERSION ELECTRIC EXT: CPT

## 2021-05-21 PROCEDURE — 92960 CARDIOVERSION ELECTRIC EXT: CPT | Performed by: NURSE PRACTITIONER

## 2021-05-21 PROCEDURE — 36415 COLL VENOUS BLD VENIPUNCTURE: CPT | Performed by: INTERNAL MEDICINE

## 2021-05-21 PROCEDURE — 93005 ELECTROCARDIOGRAM TRACING: CPT

## 2021-05-21 PROCEDURE — 370N000017 HC ANESTHESIA TECHNICAL FEE, PER MIN

## 2021-05-21 PROCEDURE — 250N000009 HC RX 250: Performed by: NURSE ANESTHETIST, CERTIFIED REGISTERED

## 2021-05-21 PROCEDURE — 93010 ELECTROCARDIOGRAM REPORT: CPT | Mod: 76 | Performed by: INTERNAL MEDICINE

## 2021-05-21 PROCEDURE — 83735 ASSAY OF MAGNESIUM: CPT | Performed by: INTERNAL MEDICINE

## 2021-05-21 PROCEDURE — 999N000132 HC STATISTIC PP CARE STAGE 1

## 2021-05-21 RX ORDER — POTASSIUM CHLORIDE 1500 MG/1
20 TABLET, EXTENDED RELEASE ORAL
Status: DISCONTINUED | OUTPATIENT
Start: 2021-05-21 | End: 2021-05-22 | Stop reason: HOSPADM

## 2021-05-21 RX ORDER — LIDOCAINE 40 MG/G
CREAM TOPICAL
Status: DISCONTINUED | OUTPATIENT
Start: 2021-05-21 | End: 2021-05-22 | Stop reason: HOSPADM

## 2021-05-21 RX ORDER — MAGNESIUM SULFATE HEPTAHYDRATE 40 MG/ML
2 INJECTION, SOLUTION INTRAVENOUS
Status: DISCONTINUED | OUTPATIENT
Start: 2021-05-21 | End: 2021-05-22 | Stop reason: HOSPADM

## 2021-05-21 RX ORDER — POTASSIUM CHLORIDE 1500 MG/1
40 TABLET, EXTENDED RELEASE ORAL
Status: DISCONTINUED | OUTPATIENT
Start: 2021-05-21 | End: 2021-05-22 | Stop reason: HOSPADM

## 2021-05-21 RX ADMIN — METHOHEXITAL SODIUM 80 MG: 500 INJECTION, POWDER, LYOPHILIZED, FOR SOLUTION INTRAMUSCULAR; INTRAVENOUS; RECTAL at 12:33

## 2021-05-21 NOTE — ANESTHESIA CARE TRANSFER NOTE
Patient: Augusto Meeks    Procedure(s):  ANESTHESIA, FOR CARDIOVERSION @1330    Diagnosis: Atrial fibrillation, unspecified type (H) [I48.91]  Diagnosis Additional Information: No value filed.    Anesthesia Type:   No value filed.     Note:                    Patient transferred to: Phase II  Comments: Anesthesia Care Transfer Note    Patient: Augusto Meeks    Transferred to: ECHO    Patient vital signs: stable    Airway: none    Monitors on, breathing spontaneously, report to RN    Lashonda Marie CRNA  5/21/2021 12:39 PM      Handoff Report: Identifed the Patient, Identified the Reponsible Provider, Reviewed the pertinent medical history, Discussed the surgical course, Reviewed Intra-OP anesthesia mangement and issues during anesthesia, Set expectations for post-procedure period and Allowed opportunity for questions and acknowledgement of understanding      Vitals: (Last set prior to Anesthesia Care Transfer)  CRNA VITALS  5/21/2021 1209 - 5/21/2021 1239      5/21/2021             NIBP:  (!) 159/100    Pulse:  73    SpO2:  96 %        Electronically Signed By: JELANI Sánchez CRNA  May 21, 2021  12:39 PM

## 2021-05-21 NOTE — ANESTHESIA POSTPROCEDURE EVALUATION
Patient: Augusto Meeks    Procedure(s):  ANESTHESIA, FOR CARDIOVERSION @1330    Diagnosis:Atrial fibrillation, unspecified type (H) [I48.91]  Diagnosis Additional Information: No value filed.    Anesthesia Type:  No value filed.    Note:  Disposition: Outpatient   Postop Pain Control: Uneventful            Sign Out: Well controlled pain   PONV: No   Neuro/Psych: Uneventful            Sign Out: Acceptable/Baseline neuro status   Airway/Respiratory: Uneventful            Sign Out: Acceptable/Baseline resp. status   CV/Hemodynamics: Uneventful            Sign Out: Acceptable CV status; No obvious hypovolemia; No obvious fluid overload   Other NRE: NONE   DID A NON-ROUTINE EVENT OCCUR? No           Last vitals:  There were no vitals filed for this visit.    Last vitals prior to Anesthesia Care Transfer:  CRNA VITALS  5/21/2021 1215 - 5/21/2021 1245      5/21/2021             NIBP:  (!) 159/100    Pulse:  73    SpO2:  96 %          Electronically Signed By: Daniela Valadez MD  May 21, 2021  12:45 PM

## 2021-05-21 NOTE — SEDATION DOCUMENTATION
Pt arrived in ECHO department  for scheduled cardioversion.  Procedure explained, questions answered and consent signed. Discharge instructions discussed with patient. Anesthesia gave pt 80 mg IV brevitol for sedation and pt was DCCV at 150 Joules to a SR.  Pt denied C.P or throat pain after procedure and was D/C home after awake and VSS.  Escorted out to front lobby by staff in w/c to meet pt's ride home.  Report given to Zabrina BLANCAS RN.

## 2021-05-21 NOTE — DISCHARGE INSTRUCTIONS
GOING HOME AFTER YOUR CARDIOVERSION    FOR NEXT 24 HOURS:    An adult should stay with you.    Relax and take it easy.    DO NOT make any important legal decisions.    DO NOT drive or operate machines at home or at work.    Resume your regular diet and drink plenty of fluids.    If you have any redness/skin sorness where the patches were placed, you may use aloe vera gel or 1% hydrocortisone cream to the skin (sold at drug stores)    Take Tylenol (Acetaminophen) per your provider's recommendations as needed to help relieve local pain.     CALL YOUR HEALTHCARE PROVIDER IF:    You develop nausea or vomiting    You develop hives or a rash or any unexplained itching    Have irregular heartbeat or fast pulse    Feel faint, dizzy, or lightheaded    Have chest pain with increased activity    Have bleeding issues from blood-thinning medicines    CALL 911 RIGHT AWAY IF YOU HAVE:    Pain in your chest, arm, shoulder, neck, or upper back    Shortness of breath    Loss of vision, speech, or strength or coordination in any body part    Weakness in your arm or leg    Uncontrolled bleeding    Feel unstable in any way     FOLLOW UP APPOINTMENT:      Follow up as scheduled with EP/Cardiology Provider in 4 weeks    ADDITIONAL INFORMATION:  Cardiovascular Clinic:   [address] [place] [address]. [place] [address]  Your Care Team:  EP Cardiology   Telephone Number     Tania Sherman NP, Dr. *** [phone]   Iris Hoover RN  [phone]     For scheduling appts or procedures:    Silvia Molina   [phone]   For the Device Clinic (Pacemakers and ICD's)   RN's :   Arleth Scanlon  During business hours: [phone]     After business hours:   [phone]- select option 4 and ask for job code [id number].       As always, Thank you for trusting us with your health care needs!      CARDIOVERSION NURSE NUMBER - [phone]

## 2021-05-21 NOTE — ANESTHESIA PREPROCEDURE EVALUATION
Anesthesia Pre-Procedure Evaluation    Patient: Augusto Meeks   MRN: [id number] : 1960        Preoperative Diagnosis: Atrial fibrillation, unspecified type (H) [I48.91]   Procedure : Procedure(s):  ANESTHESIA, FOR CARDIOVERSION @1330     Past Medical History:   Diagnosis Date     A-fib (H)      Arrhythmia      Hypertension      Irregular heart beat      Sleep apnea       Past Surgical History:   Procedure Laterality Date     ANESTHESIA CARDIOVERSION N/A 2019    Procedure: Anesthesia Coverage Transesophageal Echocardiogram, Cardioversion @0930;  Surgeon: GENERIC ANESTHESIA PROVIDER;  [facility]     ANESTHESIA CARDIOVERSION N/A 10/30/2019    Procedure: ANESTHESIA, FOR CARDIOVERSION @1100;  Surgeon: GENERIC ANESTHESIA PROVIDER;  [facility]     ANESTHESIA CARDIOVERSION N/A 2019    Procedure: CARDIOVERSION;  Surgeon: GENERIC ANESTHESIA PROVIDER;  Location:  OR     CARDIAC SURGERY  21    Ablaution     CARDIOVERSION  10/30/2019    Patient reported he had a cardioverson on 2019.     EP ABLATION FOCAL AFIB N/A 2021    Procedure: EP ABLATION FOCAL AFIB;  Surgeon: Vanessa Clement MD;  Location:  [facility]     VASECTOMY        Allergies   Allergen Reactions     Lisinopril Cough      Social History     Tobacco Use     Smoking status: Former Smoker     Packs/day: 0.00     Years: 20.00     Pack years: 0.00     Types: Cigars     Start date: 1979     Quit date: 3/3/2011     Years since quitting: 10.2     Smokeless tobacco: Never Used     Tobacco comment: occasional cigar   Substance Use Topics     Alcohol use: Yes     Comment: occas       Wt Readings from Last 1 Encounters:   21 111.1 kg (245 lb)        Anesthesia Evaluation            ROS/MED HX  ENT/Pulmonary:     (+) sleep apnea,     Neurologic:       Cardiovascular:     (+) hypertension-----Irregular Heartbeat/Palpitations,     METS/Exercise Tolerance:     Hematologic:       Musculoskeletal:        GI/Hepatic:       Renal/Genitourinary:       Endo:       Psychiatric/Substance Use:       Infectious Disease:       Malignancy:       Other:            Physical Exam    Airway             Respiratory Devices and Support         Dental           Cardiovascular          Rhythm and rate: irregular     Pulmonary   pulmonary exam normal                OUTSIDE LABS:  CBC:   Lab Results   Component Value Date    WBC 5.3 01/22/2021    HGB 12.9 (L) 01/22/2021    HCT 37.3 (L) 01/22/2021     (L) 01/22/2021     BMP:   Lab Results   Component Value Date     01/22/2021     11/15/2019    POTASSIUM 4.3 05/21/2021    POTASSIUM 4.3 01/22/2021    CHLORIDE 108 01/22/2021    CHLORIDE 104 11/15/2019    CO2 25 01/22/2021    CO2 28 11/15/2019    BUN 13 01/22/2021    BUN 16 11/15/2019    CR 0.82 01/22/2021    CR 0.89 11/15/2019     (H) 01/22/2021     (H) 11/15/2019     COAGS:   Lab Results   Component Value Date    INR 1.39 (H) 09/25/2019     POC:   Lab Results   Component Value Date     (H) 01/22/2021     HEPATIC:   Lab Results   Component Value Date    ALBUMIN 4.2 11/15/2019    PROTTOTAL 7.7 11/15/2019    ALT 48 11/15/2019    AST 20 11/15/2019    ALKPHOS 71 11/15/2019    BILITOTAL 0.7 11/15/2019     OTHER:   Lab Results   Component Value Date    A1C 4.8 11/15/2019    CHALINO 8.7 01/22/2021    MAG 2.2 05/21/2021    TSH 2.25 11/15/2019    T4 1.06 11/15/2019       Anesthesia Plan    ASA Status:  3   NPO Status:  NPO Appropriate    Anesthesia Type: General.   Induction: Intravenous.           Consents    Anesthesia Plan(s) and associated risks, benefits, and realistic alternatives discussed. Questions answered and patient/representative(s) expressed understanding.     - Discussed with:  Patient      - Extended Intubation/Ventilatory Support Discussed: No.      - Patient is DNR/DNI Status: No    Use of blood products discussed: No .     Postoperative Care            Comments:                Daniela Francis  MD [place]

## 2021-05-21 NOTE — PROGRESS NOTES
Pt on 2A post Cardioversion per litter with RN; Transport RN reports post EKG was done. Pt awake and alert, denies pain; taking PO, food and drink.  Wife called to bedside.     Post orders obtained from Taina Sherman. Pt clarified he had his discharge paperwork, denied questions.  Wife at bedside; pt tolerated PO, food and drink; IV discontinued, catheter intact. Pt steady on his feet, declined to void. 1335--escorted to  with wife.

## 2021-05-23 LAB
INTERPRETATION ECG - MUSE: NORMAL
INTERPRETATION ECG - MUSE: NORMAL

## 2021-06-21 ENCOUNTER — OFFICE VISIT (OUTPATIENT)
Dept: CARDIOLOGY | Facility: CLINIC | Age: 61
End: 2021-06-21
Payer: COMMERCIAL

## 2021-06-21 VITALS
WEIGHT: 247 LBS | BODY MASS INDEX: 33.5 KG/M2 | SYSTOLIC BLOOD PRESSURE: 127 MMHG | HEART RATE: 71 BPM | OXYGEN SATURATION: 98 % | DIASTOLIC BLOOD PRESSURE: 75 MMHG

## 2021-06-21 DIAGNOSIS — I48.19 PERSISTENT ATRIAL FIBRILLATION (H): Primary | ICD-10-CM

## 2021-06-21 PROCEDURE — 99214 OFFICE O/P EST MOD 30 MIN: CPT | Mod: GC | Performed by: INTERNAL MEDICINE

## 2021-06-21 NOTE — PROGRESS NOTES
HPI:   Augusto Meeks is a 60-year-old M with a medical history significant for hypertension, obesity and persistent atrial fibrillation.       He underwent circumferential pulmonary vein isolation for atrial fibrillation in January 2021, though experienced relief from his symptoms of fatigue and poor stamina for several days only before he reverted back to atrial fibrillation. He continues to have easy fatigability and poor stamina. He was seen in clinic on 5/3/21 with these symptoms, and was started on dronedarone with DCCV to sinus rhythm on 5/21/21.    He returns to clinic today for a 1 month follow-up after his recent DCCV. He remains on dronedarone and metoprolol as well as Xarelto for stroke risk reduction. He wears and Apple watch, and notes that he began to have notifications of atrial fibrillation 5 days following the cardioversion. He now gets notifications of atrial fibrillation every day that he wears the watch (4 episodes yesterday). He does not symptomatically notice when he goes in and out of atrial fibrillation, and wouldn't particularly know that he was in Afib were it not for the watch. He did not notice much difference in his energy level or exertional capacity on the days immediately following the cardioversion, and feels that his exercise capacity is about the same as it was a few months ago (faitgued after walking 1-2 flights of stairs or up a hill). He otherwise denies palpitations, lightheadedness, dizziness, or near syncope. He continues on anticoagulation following the cardioversion, and denies any significant bleeding issues. He has knew ankle swelling over the last month, but denies orthopnea, PND, or weight gain. The edema is worse in the evenings after work.       PAST MEDICAL HISTORY:  Past Medical History:   Diagnosis Date     A-fib (H) 2019     Arrhythmia      Hypertension 1/20     Irregular heart beat      Sleep apnea        CURRENT MEDICATIONS:  Current Outpatient Medications    Medication Sig Dispense Refill     amLODIPine (NORVASC) 5 MG tablet Take 1 tablet (5 mg) by mouth daily 90 tablet 3     dronedarone (MULTAQ) 400 MG TABS tablet Take 1 tablet (400 mg) by mouth 2 times daily (with meals) 60 tablet 6     metoprolol succinate ER (TOPROL-XL) 50 MG 24 hr tablet Take 1 tablet (50 mg) by mouth daily 90 tablet 1     rivaroxaban ANTICOAGULANT (XARELTO) 20 MG TABS tablet Take 1 tablet (20 mg) by mouth daily (with dinner) 90 tablet 3     sildenafil (REVATIO) 20 MG tablet Take 1-5 tablets one hour before intercourse. 20 tablet 1       PAST SURGICAL HISTORY:  Past Surgical History:   Procedure Laterality Date     ANESTHESIA CARDIOVERSION N/A 9/25/2019    Procedure: Anesthesia Coverage Transesophageal Echocardiogram, Cardioversion @0930;  Surgeon: GENERIC ANESTHESIA PROVIDER;  Location:  OR     ANESTHESIA CARDIOVERSION N/A 10/30/2019    Procedure: ANESTHESIA, FOR CARDIOVERSION @1100;  Surgeon: GENERIC ANESTHESIA PROVIDER;  [facility]     ANESTHESIA CARDIOVERSION N/A 12/5/2019    Procedure: CARDIOVERSION;  Surgeon: GENERIC ANESTHESIA PROVIDER;  Location:  OR     ANESTHESIA CARDIOVERSION N/A 5/21/2021    Procedure: ANESTHESIA, FOR CARDIOVERSION @1330;  Surgeon: GENERIC ANESTHESIA PROVIDER;  Location:  OR     CARDIAC SURGERY  2/22/21    Ablaution     CARDIOVERSION  10/30/2019    Patient reported he had a cardioverson on 09/25/2019.     EP ABLATION FOCAL AFIB N/A 1/22/2021    Procedure: EP ABLATION FOCAL AFIB;  Surgeon: Vanessa Clement MD;  Location:  [facility] CARD[facility] CATH LAB     VASECTOMY         ALLERGIES:     Allergies   Allergen Reactions     Lisinopril Cough       FAMILY HISTORY:  Family History   Problem Relation Age of Onset     Lung Cancer Mother      Cancer Father        SOCIAL HISTORY:  Social History     Tobacco Use     Smoking status: Former Smoker     Packs/day: 0.00     Years: 20.00     Pack years: 0.00     Types: Cigars     Start date: 12/12/1979     Quit date: 3/3/2011      Years since quitting: 10.3     Smokeless tobacco: Never Used     Tobacco comment: occasional cigar   Substance Use Topics     Alcohol use: Yes     Comment: occas      Drug use: Yes     Types: Marijuana     Comment: occas        ROS:    ROS: 10 point ROS neg other than the symptoms noted above in the HPI.    Exam:  There were no vitals taken for this visit.  GENERAL APPEARANCE: healthy, alert and no distress  HEENT: no icterus, no xanthelasmas, normal pupil size  NECK:  no bruits, JVP not elevated  RESPIRATORY: lungs clear to auscultation - no rales, rhonchi or wheezes, no use of accessory muscles, no retractions, respirations are unlabored, normal respiratory rate  CARDIOVASCULAR: irregularly irregular rhythm, normal S1 with physiologic split S2, no S3 or S4 and no murmur, click or rub  EXTREMITIES: peripheral pulses normal, 1+ bipedal pitting edema  NEURO: alert and oriented to person/place/time, normal speech, gait and affect  VASC: Radial pulses are normal in volumes and symmetric bilaterally.  SKIN: no ecchymoses, no rashes    Labs:  CBC RESULTS:   Lab Results   Component Value Date    WBC 5.3 01/22/2021    RBC 3.88 (L) 01/22/2021    HGB 12.9 (L) 01/22/2021    HCT 37.3 (L) 01/22/2021    MCV 96 01/22/2021    MCH 33.2 (H) 01/22/2021    MCHC 34.6 01/22/2021    RDW 14.9 01/22/2021     (L) 01/22/2021       BMP RESULTS:  Lab Results   Component Value Date     01/22/2021    POTASSIUM 4.3 05/21/2021    CHLORIDE 108 01/22/2021    CO2 25 01/22/2021    ANIONGAP 6 01/22/2021     (H) 01/22/2021    BUN 13 01/22/2021    CR 0.82 01/22/2021    GFRESTIMATED >90 01/22/2021    GFRESTBLACK >90 01/22/2021    CHALINO 8.7 01/22/2021        INR RESULTS:  Lab Results   Component Value Date    INR 1.39 (H) 09/25/2019       Procedures:  5/21/21  PROCEDURE:  The patient arrived at the Echo Laboratory in a fasting, non-sedated state.  Informed consent was previously obtained from patient, who understood indications, risks,  and benefits of the procedure. Patient on uniterrupted anticoagulation for > 4 weeks. With help from Anesthesia, patient was deeply sedated.  150J of synchronized biphasic shock was delivered through the cardiac monitor, and the patient was successfully converted to normal sinus rhythm.  After sedation wore off, patient awoke and remained neurologically intact.  The patient tolerated the procedure well from a hemodynamic and respiratory standpoint without any complications.  He was observed in the Echo Laboratory and then discharged to home after sedation wore off and he was ambulatory.     IMPRESSION:  1.  Successful direct current cardioversion with 150J biphasic shock.    TTE 5/7/21  Interpretation Summary     The patient's rhythm is atrial fibrillation.  Global and regional left ventricular function is normal with an EF of 60-65%.  Global right ventricular function is normal.  Both atria appear normal.  No significant valvular abnormalities were noted.  LA Volume Index (BP): 31.0 ml/m2     This study was compared with the study from 1/21/2021 .There has been no  change.    Assessment and Plan:   # Persistent atrial fibrillation- with exertional dyspnea on mild exertion  # Hypertension     He has recently underwent DCCV on dronedarone which was only able to restore sinus rhythm for 5 days. He has previously failed flecainide as well as several DCCV and Afib ablation in January of 2021. Given his young age, and his ITZEL of only 31, he is a candidate for repeat ablation, which was discussed extensively with him today. 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 In the interim, we will proceed with a rate control strategy and stop dronedarone as it was unsuccessful.    [id number] = 1  Rate Control: continue metoprolol succinate 50mg daily  Rhythm Control: AF is recalcitrant. Stop dronedarone. Will return to clinic in 3-4  months for consideration of repeat ablation  Anticoagulation: OK to stop apixaban now that we are 4 weeks post DCCV and [id number] is only 1    Return to clinic in 3 months with a 1 week Zio patch prior. Will discuss repeat ablation at that time.       The patient was seen and examined with the attending cardiologist, Dr. Clement, who is in agreement with the assessment and plan.    Yeison Candelaria MD  Cardiology Fellow      I have seen, interviewed, and examined patient. I have reviewed the laboratory tests, imaging, and other investigations. I have reviewed the management plan with the patient. I discussed with the team and agree with the findings and plan in this resident/fellow/nurse practitioner's note. In addition, changes in the physical examination, assessment and plan have been incorporated into the note by myself, as to make it a single cohesive document.       Vanessa Clement MD, MS  Cardiology/Cardiac EP Attending Staff        CC  Patient Care Team:  Louann Ramirez APRN CNP as PCP - General (Nurse Practitioner - Family)  Louann Ramirez APRN CNP as Assigned PCP  Vanessa Clement MD as Assigned Heart and Vascular Provider  Lacy New MD as Assigned Neuroscience Provider  Hiram Covarrubias MD as MD (Critical Care)  Vanessa Clement MD as Referring Physician (Clinical Cardiac Electrophysiology)  Hiram Covarrubias MD as Assigned Pulmonology Provider

## 2021-06-21 NOTE — LETTER
6/21/2021      RE: Augusto Meeks  [address] [facility] [address]       Dear Colleague,    Thank you for the opportunity to participate in the care of your patient, Augusto Meeks, at the [facility]. Please see a copy of my visit note below.    HPI:   Augusto Meeks is a 60-year-old M with a medical history significant for hypertension, obesity and persistent atrial fibrillation.       He underwent circumferential pulmonary vein isolation for atrial fibrillation in January 2021, though experienced relief from his symptoms of fatigue and poor stamina for several days only before he reverted back to atrial fibrillation. He continues to have easy fatigability and poor stamina. He was seen in clinic on 5/3/21 with these symptoms, and was started on dronedarone with DCCV to sinus rhythm on 5/21/21.    He returns to clinic today for a 1 month follow-up after his recent DCCV. He remains on dronedarone and metoprolol as well as Xarelto for stroke risk reduction. He wears and Apple watch, and notes that he began to have notifications of atrial fibrillation 5 days following the cardioversion. He now gets notifications of atrial fibrillation every day that he wears the watch (4 episodes yesterday). He does not symptomatically notice when he goes in and out of atrial fibrillation, and wouldn't particularly know that he was in Afib were it not for the watch. He did not notice much difference in his energy level or exertional capacity on the days immediately following the cardioversion, and feels that his exercise capacity is about the same as it was a few months ago (faitgued after walking 1-2 flights of stairs or up a hill). He otherwise denies palpitations, lightheadedness, dizziness, or near syncope. He continues on anticoagulation following the cardioversion, and denies any significant bleeding issues. He has knew ankle swelling  over the last month, but denies orthopnea, PND, or weight gain. The edema is worse in the evenings after work.       PAST MEDICAL HISTORY:  Past Medical History:   Diagnosis Date     A-fib (H) 2019     Arrhythmia      Hypertension 1/20     Irregular heart beat      Sleep apnea        CURRENT MEDICATIONS:  Current Outpatient Medications   Medication Sig Dispense Refill     amLODIPine (NORVASC) 5 MG tablet Take 1 tablet (5 mg) by mouth daily 90 tablet 3     dronedarone (MULTAQ) 400 MG TABS tablet Take 1 tablet (400 mg) by mouth 2 times daily (with meals) 60 tablet 6     metoprolol succinate ER (TOPROL-XL) 50 MG 24 hr tablet Take 1 tablet (50 mg) by mouth daily 90 tablet 1     rivaroxaban ANTICOAGULANT (XARELTO) 20 MG TABS tablet Take 1 tablet (20 mg) by mouth daily (with dinner) 90 tablet 3     sildenafil (REVATIO) 20 MG tablet Take 1-5 tablets one hour before intercourse. 20 tablet 1       PAST SURGICAL HISTORY:  Past Surgical History:   Procedure Laterality Date     ANESTHESIA CARDIOVERSION N/A 9/25/2019    Procedure: Anesthesia Coverage Transesophageal Echocardiogram, Cardioversion @0930;  Surgeon: GENERIC ANESTHESIA PROVIDER;  Location:  OR     ANESTHESIA CARDIOVERSION N/A 10/30/2019    Procedure: ANESTHESIA, FOR CARDIOVERSION @1100;  Surgeon: GENERIC ANESTHESIA PROVIDER;  Location:  OR     ANESTHESIA CARDIOVERSION N/A 12/5/2019    Procedure: CARDIOVERSION;  Surgeon: GENERIC ANESTHESIA PROVIDER;  Location:  OR     ANESTHESIA CARDIOVERSION N/A 5/21/2021    Procedure: ANESTHESIA, FOR CARDIOVERSION @1330;  Surgeon: GENERIC ANESTHESIA PROVIDER;  Location:  OR     CARDIAC SURGERY  2/22/21    Ablaution     CARDIOVERSION  10/30/2019    Patient reported he had a cardioverson on 09/25/2019.     EP ABLATION FOCAL AFIB N/A 1/22/2021    Procedure: EP ABLATION FOCAL AFIB;  Surgeon: Vanessa Clement MD;  Location:  HEART CARDIAC CATH LAB     VASECTOMY         ALLERGIES:     Allergies   Allergen Reactions     Lisinopril  Cough       FAMILY HISTORY:  Family History   Problem Relation Age of Onset     Lung Cancer Mother      Cancer Father        SOCIAL HISTORY:  Social History     Tobacco Use     Smoking status: Former Smoker     Packs/day: 0.00     Years: 20.00     Pack years: 0.00     Types: Cigars     Start date: 12/12/1979     Quit date: 3/3/2011     Years since quitting: 10.3     Smokeless tobacco: Never Used     Tobacco comment: occasional cigar   Substance Use Topics     Alcohol use: Yes     Comment: occas      Drug use: Yes     Types: Marijuana     Comment: occas        ROS:    ROS: 10 point ROS neg other than the symptoms noted above in the HPI.    Exam:  There were no vitals taken for this visit.  GENERAL APPEARANCE: healthy, alert and no distress  HEENT: no icterus, no xanthelasmas, normal pupil size  NECK:  no bruits, JVP not elevated  RESPIRATORY: lungs clear to auscultation - no rales, rhonchi or wheezes, no use of accessory muscles, no retractions, respirations are unlabored, normal respiratory rate  CARDIOVASCULAR: irregularly irregular rhythm, normal S1 with physiologic split S2, no S3 or S4 and no murmur, click or rub  EXTREMITIES: peripheral pulses normal, 1+ bipedal pitting edema  NEURO: alert and oriented to person/place/time, normal speech, gait and affect  VASC: Radial pulses are normal in volumes and symmetric bilaterally.  SKIN: no ecchymoses, no rashes    Labs:  CBC RESULTS:   Lab Results   Component Value Date    WBC 5.3 01/22/2021    RBC 3.88 (L) 01/22/2021    HGB 12.9 (L) 01/22/2021    HCT 37.3 (L) 01/22/2021    MCV 96 01/22/2021    MCH 33.2 (H) 01/22/2021    MCHC 34.6 01/22/2021    RDW 14.9 01/22/2021     (L) 01/22/2021       BMP RESULTS:  Lab Results   Component Value Date     01/22/2021    POTASSIUM 4.3 05/21/2021    CHLORIDE 108 01/22/2021    CO2 25 01/22/2021    ANIONGAP 6 01/22/2021     (H) 01/22/2021    BUN 13 01/22/2021    CR 0.82 01/22/2021    GFRESTIMATED >90 01/22/2021     GFRESTBLACK >90 01/22/2021    CHALINO 8.7 01/22/2021        INR RESULTS:  Lab Results   Component Value Date    INR 1.39 (H) 09/25/2019       Procedures:  5/21/21  PROCEDURE:  The patient arrived at the Echo Laboratory in a fasting, non-sedated state.  Informed consent was previously obtained from patient, who understood indications, risks, and benefits of the procedure. Patient on uniterrupted anticoagulation for > 4 weeks. With help from Anesthesia, patient was deeply sedated.  150J of synchronized biphasic shock was delivered through the cardiac monitor, and the patient was successfully converted to normal sinus rhythm.  After sedation wore off, patient awoke and remained neurologically intact.  The patient tolerated the procedure well from a hemodynamic and respiratory standpoint without any complications.  He was observed in the Echo Laboratory and then discharged to home after sedation wore off and he was ambulatory.     IMPRESSION:  1.  Successful direct current cardioversion with 150J biphasic shock.    TTE 5/7/21  Interpretation Summary     The patient's rhythm is atrial fibrillation.  Global and regional left ventricular function is normal with an EF of 60-65%.  Global right ventricular function is normal.  Both atria appear normal.  No significant valvular abnormalities were noted.  LA Volume Index (BP): 31.0 ml/m2     This study was compared with the study from 1/21/2021 .There has been no  change.    Assessment and Plan:   # Persistent atrial fibrillation- with exertional dyspnea on mild exertion  # Hypertension     He has recently underwent DCCV on dronedarone which was only able to restore sinus rhythm for 5 days. He has previously failed flecainide as well as several DCCV and Afib ablation in January of 2021. Given his young age, and his ITZEL of only 31, he is a candidate for repeat ablation, which was discussed extensively with him today. 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 In the interim, we will proceed with a rate control strategy and stop dronedarone as it was unsuccessful.    [id number] = 1  Rate Control: continue metoprolol succinate 50mg daily  Rhythm Control: AF is recalcitrant. Stop dronedarone. Will return to clinic in 3-4 months for consideration of repeat ablation  Anticoagulation: OK to stop apixaban now that we are 4 weeks post DCCV and [id number] is only 1    Return to clinic in 3 months with a 1 week Zio patch prior. Will discuss repeat ablation at that time.       The patient was seen and examined with the attending cardiologist, Dr. Clement, who is in agreement with the assessment and plan.    Yeison Candelaria MD  Cardiology Fellow      I have seen, interviewed, and examined patient. I have reviewed the laboratory tests, imaging, and other investigations. I have reviewed the management plan with the patient. I discussed with the team and agree with the findings and plan in this resident/fellow/nurse practitioner's note. In addition, changes in the physical examination, assessment and plan have been incorporated into the note by myself, as to make it a single cohesive document.       Vanessa Clement MD, MS  Cardiology/Cardiac EP Attending Staff        CC  Patient Care Team:  Louann Ramirez, JELANI CNP as PCP - General (Nurse Practitioner - Family)  Lacy New MD as Assigned Neuroscience Provider  Hiram Covarrubias MD as MD (Critical Care)

## 2021-06-21 NOTE — PATIENT INSTRUCTIONS
Thank you for coming to the [facility] [place] [facility]; please note the following instructions:    1. STOP Multaq    2.  STOP Xarelto    3.  Follow up in 3 months with a 1 week zio prior        If you have any questions regarding your visit please contact your care team:     Cardiology  Telephone Number   Beronica HINKLE, RN  Delisa TIRADO, RN   Lyric PARRISH, RMA  Yoselin SOLIS, RMA  Kacie TRAN, LPN   [phone] (option 1)   For scheduling appts:     [phone] (select option 1)       For the Device Clinic (Pacemakers and ICD's)  RN's :  Arleth Scanlon   During business hours: [phone]    *After business hours:  [phone] (select option 4)      Normal test result notifications will be released via [facility] or mailed within 7 business days.  All other test results, will be communicated via telephone once reviewed by your cardiologist.    If you need a medication refill please contact your pharmacy.  Please allow 3 business days for your refill to be completed.    As always, thank you for trusting us with your health care needs!

## 2021-06-21 NOTE — NURSING NOTE
Chief Complaint   Patient presents with     RECHECK     S/P cardioversion.        Initial /75 (BP Location: Left arm, Patient Position: Chair, Cuff Size: Adult Large)   Pulse 71   Wt 112 kg (247 lb)   SpO2 98%   BMI 33.50 kg/m   Estimated body mass index is 33.5 kg/m  as calculated from the following:    Height as of 3/3/21: 1.829 m (6').    Weight as of this encounter: 112 kg (247 lb)..  BP completed using cuff size: large    Yoselin Hough MA

## 2021-08-17 ENCOUNTER — MYC MEDICAL ADVICE (OUTPATIENT)
Dept: FAMILY MEDICINE | Facility: CLINIC | Age: 61
End: 2021-08-17

## 2021-08-17 DIAGNOSIS — N52.9 ERECTILE DYSFUNCTION, UNSPECIFIED ERECTILE DYSFUNCTION TYPE: ICD-10-CM

## 2021-08-17 RX ORDER — SILDENAFIL CITRATE 20 MG/1
TABLET ORAL
Qty: 20 TABLET | Refills: 1 | Status: SHIPPED | OUTPATIENT
Start: 2021-08-17 | End: 2021-10-20

## 2021-08-17 NOTE — TELEPHONE ENCOUNTER
Last seen in primary care on 3/3/2021 by Dr. Valencia. Refilled for 2 refills per protocol. Thank you. Kelsi Cummins R.N.

## 2021-08-20 ENCOUNTER — ANCILLARY PROCEDURE (OUTPATIENT)
Dept: CT IMAGING | Facility: CLINIC | Age: 61
End: 2021-08-20
Attending: INTERNAL MEDICINE
Payer: COMMERCIAL

## 2021-08-20 DIAGNOSIS — R91.8 PULMONARY NODULES: ICD-10-CM

## 2021-08-20 PROCEDURE — 71250 CT THORAX DX C-: CPT | Mod: GC | Performed by: RADIOLOGY

## 2021-08-23 ENCOUNTER — ANCILLARY PROCEDURE (OUTPATIENT)
Dept: CARDIOLOGY | Facility: CLINIC | Age: 61
End: 2021-08-23
Attending: INTERNAL MEDICINE
Payer: COMMERCIAL

## 2021-08-23 DIAGNOSIS — I48.19 PERSISTENT ATRIAL FIBRILLATION (H): ICD-10-CM

## 2021-08-23 PROCEDURE — 93244 EXT ECG>48HR<7D REV&INTERPJ: CPT | Performed by: INTERNAL MEDICINE

## 2021-08-23 PROCEDURE — 93242 EXT ECG>48HR<7D RECORDING: CPT | Performed by: INTERNAL MEDICINE

## 2021-08-23 NOTE — PROGRESS NOTES
1 week ZioXT applied to patient today. Instructions on use and removal given and mail back instructions discussed. All questions answered. Consent form signed. Device registered. Form faxed to [employer].  Device number: [id number].    Yoselin Hough MA

## 2021-08-24 ENCOUNTER — VIRTUAL VISIT (OUTPATIENT)
Dept: PULMONOLOGY | Facility: CLINIC | Age: 61
End: 2021-08-24
Attending: INTERNAL MEDICINE
Payer: COMMERCIAL

## 2021-08-24 DIAGNOSIS — R91.8 PULMONARY NODULES: Primary | ICD-10-CM

## 2021-08-24 PROCEDURE — 99213 OFFICE O/P EST LOW 20 MIN: CPT | Mod: 95 | Performed by: INTERNAL MEDICINE

## 2021-08-24 NOTE — PROGRESS NOTES
"Chandu is a 60 year old who is being evaluated via a billable video visit.      How would you like to obtain your AVS? MyChart     If the video visit is dropped, the invitation should be resent by: Text to cell phone: [phone]     Will anyone else be joining your video visit? No         Vitals - Patient Reported  Weight (Patient Reported): 106.6 kg (235 lb)  Height (Patient Reported): 180.3 cm (5' 11\")  BMI (Based on Pt Reported Ht/Wt): 32.78  Pain Score: No Pain (0)    Eddie Adler LPN    "

## 2021-08-24 NOTE — PROGRESS NOTES
"LUNG NODULE & INTERVENTIONAL PULMONARY CLINIC  CLINICS & SURGERY CENTER, [facility], [facility]   VIDEO VISIT    Augusto Meeks MRN# [id number]   Age: 60 year old YOB: 1960     Reason for Consultation: lung nodule(s)    Requesting Physician: Karli Yo MD  No address on file       The patient has been notified of following:   \"This video visit will be conducted via a call between you and your physician/provider. We have found that certain health care needs can be provided without the need for an in-person physical exam.  This service lets us provide the care you need with a video conversation.  If a prescription is necessary we can send it directly to your pharmacy.  If lab work is needed we can place an order for that and you can then stop by our lab to have the test done at a later time.  Video visits are billed at different rates depending on your insurance coverage.  Please reach out to your insurance provider with any questions.  If during the course of the call the physician/provider feels a video visit is not appropriate, you will not be charged for this service.\"  Patient has given verbal consent for Video visit? Yes  How would you like to obtain your AVS? Please refer to rooming staff note  Patient would like the video invitation sent by: Please refer to rooming staff note   Will anyone else be joining your video visit? Please refer to rooming staff note       Video-Visit Details     Type of service:  Video Visit  Video Start Time: 252  Video End Time: 310  Originating Location (pt. Location): Home  Distant Location (provider location): Home/Clinic  Platform used for Video Visit: [facility]     Hiram Covarrubias MD      Assessment and Plan:    1. New multiple pulmonary lung nodule(s). Given the characteristics on current/previous imaging and risk factors; I would classify this to be Intermediate (6-65%) risk for cancer. Multiple bilateral sub6mm ggo's. Cont " Gelacio, gm CT in 12mo        Billing: I spent 30 minutes including face to face, chart review, personal and documented interpretation of results, counseling and coordination of care about the issues above.     Hiram Covarrubias MD   of Medicine  Interventional Pulmonology  Department of Pulmonary, Allergy, Critical Care and Sleep Medicine   [facility], [facility]  Pager: [phone]           History:      Augusto Meeks is a 60 year old male with sig h/o for afib, GABRIEL who is here for evaluation/followup of lung nodule(s).     - No new resp sx or complaints. Denies dyspnea or cough.   - here for eval of nodules  - Personal hx of cancer: na  - Family hx of cancer: na  - Exposure hx: Denies asbestos or radon exposure   - Tobacco hx: Past Smoker: 1ppd for 30years. Quit 10yrs ago. Has an occasional cigar.  - My interpretation of the images relevant for this visit includes: ggo   - My interpretation of the PFT's relevant for this visit includes: None      Culprit Nodule(s):   1. sub6mm ggo bilaterally. No interval change in ggo's.      Other active medical problems include:   - has afib. Stable on BB and anticoagulation   - has GABRIEL. On CPAP             Past Medical History:      Past Medical History:   Diagnosis Date     A-fib (H) 2019     Arrhythmia      Hypertension 1/20     Irregular heart beat      Sleep apnea              Past Surgical History:      Past Surgical History:   Procedure Laterality Date     ANESTHESIA CARDIOVERSION N/A 9/25/2019    Procedure: Anesthesia Coverage Transesophageal Echocardiogram, Cardioversion @0930;  Surgeon: GENERIC ANESTHESIA PROVIDER;  Location: [facility] OR     ANESTHESIA CARDIOVERSION N/A 10/30/2019    Procedure: ANESTHESIA, FOR CARDIOVERSION @1100;  Surgeon: GENERIC ANESTHESIA PROVIDER;  Location: [facility] OR     ANESTHESIA CARDIOVERSION N/A 12/5/2019    Procedure: CARDIOVERSION;  Surgeon: GENERIC ANESTHESIA PROVIDER;  Location: [facility] OR     ANESTHESIA  CARDIOVERSION N/A 5/21/2021    Procedure: ANESTHESIA, FOR CARDIOVERSION @1330;  Surgeon: GENERIC ANESTHESIA PROVIDER;  Location:  OR     CARDIAC SURGERY  2/22/21    Ablaution     CARDIOVERSION  10/30/2019    Patient reported he had a cardioverson on 09/25/2019.     EP ABLATION FOCAL AFIB N/A 1/22/2021    Procedure: EP ABLATION FOCAL AFIB;  Surgeon: Vanessa Clement MD;  Location:  HEART CARDIAC CATH LAB     VASECTOMY              Social History:     Social History     Tobacco Use     Smoking status: Former Smoker     Packs/day: 0.00     Years: 20.00     Pack years: 0.00     Types: Cigars     Start date: 12/12/1979     Quit date: 3/3/2011     Years since quitting: 10.4     Smokeless tobacco: Never Used     Tobacco comment: occasional cigar   Substance Use Topics     Alcohol use: Yes     Comment: occas             Family History:     Family History   Problem Relation Age of Onset     Lung Cancer Mother      Cancer Father              Allergies:      Allergies   Allergen Reactions     Lisinopril Cough            Medications:     Current Outpatient Medications   Medication Sig     amLODIPine (NORVASC) 5 MG tablet Take 1 tablet (5 mg) by mouth daily     metoprolol succinate ER (TOPROL-XL) 50 MG 24 hr tablet Take 1 tablet (50 mg) by mouth daily     sildenafil (REVATIO) 20 MG tablet Take 1-5 tablets one hour before intercourse.     No current facility-administered medications for this visit.            Review of Systems:     CONSTITUTIONAL: negative for fever, chills, change in weight  INTEGUMENTARY/SKIN: no rash or obvious new lesions  ENT/MOUTH: no sore throat, new sinus pain or nasal drainage  RESP: see interval history  CV: negative for chest pain, palpitations or peripheral edema  GI: no nausea, vomiting, change in stools  : no dysuria  MUSCULOSKELETAL: no myalgias, arthralgias  ENDOCRINE: blood sugars with adequate control  PSYCHIATRIC: mood stable  LYMPHATIC: no new lymphadenopathy  HEME: no bleeding or easy  bruisability  NEURO: no numbness, weakness, headaches         Physical Exam:     Constitutional - looks well, in no apparent distress  Eyes - no redness or discharge  Respiratory -breathing appears comfortable.  No cough.  Skin - No appreciable discoloration or lesions (very limited exam)  Neurological - No apparent tremors. Speech fluent and articlate  Psychiatric - no signs of delirium or anxiety     Exam limited to that easily identified on a virtual visit. The rest of a comprehensive physical examination is deferred due to PHE (public health emergency) video visit restrictions.         Current Laboratory Data:   All laboratory and imaging data reviewed.           Previous Cardiology Imaging   No results found for this or any previous visit (from the past 8760 hour(s)).

## 2021-08-24 NOTE — LETTER
"8/24/2021       RE: Augusto Meeks  [address] [facility] [address]     Dear Colleague,    Thank you for referring your patient, Augusto Meeks, to the [facility] at [facility]. Please see a copy of my visit note below.    Chandu is a 60 year old who is being evaluated via a billable video visit.      How would you like to obtain your AVS? MyChart     If the video visit is dropped, the invitation should be resent by: Text to cell phone: [phone]     Will anyone else be joining your video visit? No         Vitals - Patient Reported  Weight (Patient Reported): 106.6 kg (235 lb)  Height (Patient Reported): 180.3 cm (5' 11\")  BMI (Based on Pt Reported Ht/Wt): 32.78  Pain Score: No Pain (0)    Eddie Adler LPN      LUNG NODULE & INTERVENTIONAL PULMONARY CLINIC  CLINICS & SURGERY [place], [facility]   VIDEO VISIT    Augusto Meeks MRN# [id number]   Age: 60 year old YOB: 1960     Reason for Consultation: lung nodule(s)    Requesting Physician: Referred Self, MD  No address on file       The patient has been notified of following:   \"This video visit will be conducted via a call between you and your physician/provider. We have found that certain health care needs can be provided without the need for an in-person physical exam.  This service lets us provide the care you need with a video conversation.  If a prescription is necessary we can send it directly to your pharmacy.  If lab work is needed we can place an order for that and you can then stop by our lab to have the test done at a later time.  Video visits are billed at different rates depending on your insurance coverage.  Please reach out to your insurance provider with any questions.  If during the course of the call the physician/provider feels a video visit is not appropriate, you will not be charged for this " "service.\"  Patient has given verbal consent for Video visit? Yes  How would you like to obtain your AVS? Please refer to rooming staff note  Patient would like the video invitation sent by: Please refer to rooming staff note   Will anyone else be joining your video visit? Please refer to rooming staff note       Video-Visit Details     Type of service:  Video Visit  Video Start Time: 252  Video End Time: 310  Originating Location (pt. Location): Home  Distant Location (provider location): Home/Clinic  Platform used for Video Visit: [employer]     Hiram Covarrubias MD      Assessment and Plan:    1. New multiple pulmonary lung nodule(s). Given the characteristics on current/previous imaging and risk factors; I would classify this to be Intermediate (6-65%) risk for cancer. Multiple bilateral sub6mm ggo's. Cont gm [place] CT in 12mo        Billing: I spent 30 minutes including face to face, chart review, personal and documented interpretation of results, counseling and coordination of care about the issues above.     Hiram Covarrubias MD   of Medicine  Interventional Pulmonology  Department of Pulmonary, Allergy, Critical Care and Sleep Medicine   [facility]  Pager: [phone]           History:      Augusto Meeks is a 60 year old male with sig h/o for afib, GABRIEL who is here for evaluation/followup of lung nodule(s).     - No new resp sx or complaints. Denies dyspnea or cough.   - here for eval of nodules  - Personal hx of cancer: na  - Family hx of cancer: na  - Exposure hx: Denies asbestos or radon exposure   - Tobacco hx: Past Smoker: 1ppd for 30years. Quit 10yrs ago. Has an occasional cigar.  - My interpretation of the images relevant for this visit includes: ggo   - My interpretation of the PFT's relevant for this visit includes: None      Culprit Nodule(s):   1. sub6mm ggo bilaterally. No interval change in ggo's.      Other active medical problems include:   - has " afib. Stable on BB and anticoagulation   - has GABRIEL. On CPAP             Past Medical History:      Past Medical History:   Diagnosis Date     A-fib (H) 2019     Arrhythmia      Hypertension 1/20     Irregular heart beat      Sleep apnea              Past Surgical History:      Past Surgical History:   Procedure Laterality Date     ANESTHESIA CARDIOVERSION N/A 9/25/2019    Procedure: Anesthesia Coverage Transesophageal Echocardiogram, Cardioversion @0930;  Surgeon: GENERIC ANESTHESIA PROVIDER;  Location:  OR     ANESTHESIA CARDIOVERSION N/A 10/30/2019    Procedure: ANESTHESIA, FOR CARDIOVERSION @1100;  Surgeon: GENERIC ANESTHESIA PROVIDER;  [facility]     ANESTHESIA CARDIOVERSION N/A 12/5/2019    Procedure: CARDIOVERSION;  Surgeon: GENERIC ANESTHESIA PROVIDER;  Location:  OR     ANESTHESIA CARDIOVERSION N/A 5/21/2021    Procedure: ANESTHESIA, FOR CARDIOVERSION @1330;  Surgeon: GENERIC ANESTHESIA PROVIDER;  Location:  OR     CARDIAC SURGERY  2/22/21    Ablaution     CARDIOVERSION  10/30/2019    Patient reported he had a cardioverson on 09/25/2019.     EP ABLATION FOCAL AFIB N/A 1/22/2021    Procedure: EP ABLATION FOCAL AFIB;  Surgeon: Vanessa Clement MD;  Location:  HEART CARDIAC CATH LAB     VASECTOMY              Social History:     Social History     Tobacco Use     Smoking status: Former Smoker     Packs/day: 0.00     Years: 20.00     Pack years: 0.00     Types: Cigars     Start date: 12/12/1979     Quit date: 3/3/2011     Years since quitting: 10.4     Smokeless tobacco: Never Used     Tobacco comment: occasional cigar   Substance Use Topics     Alcohol use: Yes     Comment: occas             Family History:     Family History   Problem Relation Age of Onset     Lung Cancer Mother      Cancer Father              Allergies:      Allergies   Allergen Reactions     Lisinopril Cough            Medications:     Current Outpatient Medications   Medication Sig     amLODIPine (NORVASC) 5 MG tablet Take 1  tablet (5 mg) by mouth daily     metoprolol succinate ER (TOPROL-XL) 50 MG 24 hr tablet Take 1 tablet (50 mg) by mouth daily     sildenafil (REVATIO) 20 MG tablet Take 1-5 tablets one hour before intercourse.     No current facility-administered medications for this visit.            Review of Systems:     CONSTITUTIONAL: negative for fever, chills, change in weight  INTEGUMENTARY/SKIN: no rash or obvious new lesions  ENT/MOUTH: no sore throat, new sinus pain or nasal drainage  RESP: see interval history  CV: negative for chest pain, palpitations or peripheral edema  GI: no nausea, vomiting, change in stools  : no dysuria  MUSCULOSKELETAL: no myalgias, arthralgias  ENDOCRINE: blood sugars with adequate control  PSYCHIATRIC: mood stable  LYMPHATIC: no new lymphadenopathy  HEME: no bleeding or easy bruisability  NEURO: no numbness, weakness, headaches         Physical Exam:     Constitutional - looks well, in no apparent distress  Eyes - no redness or discharge  Respiratory -breathing appears comfortable.  No cough.  Skin - No appreciable discoloration or lesions (very limited exam)  Neurological - No apparent tremors. Speech fluent and articlate  Psychiatric - no signs of delirium or anxiety     Exam limited to that easily identified on a virtual visit. The rest of a comprehensive physical examination is deferred due to PHE (public health emergency) video visit restrictions.         Current Laboratory Data:   All laboratory and imaging data reviewed.           Previous Cardiology Imaging   No results found for this or any previous visit (from the past 8760 hour(s)).                 Again, thank you for allowing me to participate in the care of your patient.      Sincerely,    Hiram Covarrubias MD

## 2021-09-02 ENCOUNTER — VIRTUAL VISIT (OUTPATIENT)
Dept: URGENT CARE | Facility: CLINIC | Age: 61
End: 2021-09-02
Payer: COMMERCIAL

## 2021-09-02 DIAGNOSIS — H92.03 OTALGIA, BILATERAL: Primary | ICD-10-CM

## 2021-09-02 PROCEDURE — 99213 OFFICE O/P EST LOW 20 MIN: CPT | Mod: 95

## 2021-09-02 RX ORDER — AMOXICILLIN 875 MG
875 TABLET ORAL 2 TIMES DAILY
Qty: 20 TABLET | Refills: 0 | Status: SHIPPED | OUTPATIENT
Start: 2021-09-02 | End: 2021-09-12

## 2021-09-02 NOTE — PATIENT INSTRUCTIONS
Start antibiotic and take as directed for possible ear infection.  Use plain Mucinex for symptom management along with your antihistamine.  Get some Afrin nasal spray at the pharmacy, and 30 minutes prior to boarding your plane use 1 squirt in each nostril for decongestion to help with takeoff and descent when flying back home.  If you are still having symptoms when you come home, please be seen in person.

## 2021-09-02 NOTE — PROGRESS NOTES
Chandu is a 60 year old who is being evaluated via a billable video visit.      How would you like to obtain your AVS? MyChart  If the video visit is dropped, the invitation should be resent by: Text to cell phone: [phone]  Will anyone else be joining your video visit? No    Video Start Time: 1451    Assessment & Plan     Otalgia, bilateral  We will treat for possible bilateral otitis due to symptoms he is currently experiencing.  Continue with antihistamine.  Add plain Mucinex twice a day for symptom management.  May use Tylenol or ibuprofen if needed.  Discussed using Afrin nasal spray 30 minutes prior to takeoff on his way home.    - amoxicillin (AMOXIL) 875 MG tablet; Take 1 tablet (875 mg) by mouth 2 times daily for 10 days      15 minutes spent on the date of the encounter doing chart review, patient visit and documentation        See Patient Instructions    Return in about 1 week (around 9/9/2021), or if symptoms worsen or fail to improve.    Virtual Urgent Care  [facility] VIRTUAL URGENT CARE    Subjective   Chandu is a 60 year old who presents for the following health issues     HPI     60-year-old male presents to virtual urgent care via a video visit for an ear concern.  He is currently in [facility] and experiencing bilateral ear pain after he flew to [place].  Prior to his flight he was having congestion and a scratchy throat, does have allergies and takes an antihistamine.  States during his flight he was in excruciating pain in his ears.  Denies any fever, drainage from the ears, cough or sore throat currently.  States ear pain is dull and achy.  Using Tylenol and NyQuil for symptom management.  Has never had ear issues in the past.  States he is having a difficult time hearing currently.    Review of Systems   Constitutional, HEENT, cardiovascular, pulmonary, gi and gu systems are negative, except as otherwise noted.      Objective           Vitals:  No vitals were obtained today due to  virtual visit.    Physical Exam   GENERAL: Healthy, alert and no distress  EYES: Eyes grossly normal to inspection.  No discharge or erythema, or obvious scleral/conjunctival abnormalities.  RESP: No audible wheeze, cough, or visible cyanosis.  No visible retractions or increased work of breathing.    SKIN: Visible skin clear. No significant rash, abnormal pigmentation or lesions.  NEURO: Cranial nerves grossly intact.  Mentation and speech appropriate for age.  PSYCH: Mentation appears normal, affect normal/bright, judgement and insight intact, normal speech and appearance well-groomed.            Video-Visit Details    Type of service:  Video Visit    Video End Time:3:21 PM    Originating Location (pt. Location): [facility]    Distant Location (provider location):  [facility] URGENT CARE     Platform used for Video Visit: [employer]

## 2021-09-13 ENCOUNTER — OFFICE VISIT (OUTPATIENT)
Dept: CARDIOLOGY | Facility: CLINIC | Age: 61
End: 2021-09-13
Payer: COMMERCIAL

## 2021-09-13 VITALS
BODY MASS INDEX: 34.31 KG/M2 | SYSTOLIC BLOOD PRESSURE: 148 MMHG | WEIGHT: 253 LBS | DIASTOLIC BLOOD PRESSURE: 93 MMHG | OXYGEN SATURATION: 98 % | HEART RATE: 74 BPM

## 2021-09-13 DIAGNOSIS — I48.19 PERSISTENT ATRIAL FIBRILLATION (H): Primary | ICD-10-CM

## 2021-09-13 PROCEDURE — 99215 OFFICE O/P EST HI 40 MIN: CPT | Mod: GC | Performed by: INTERNAL MEDICINE

## 2021-09-13 RX ORDER — DABIGATRAN ETEXILATE 150 MG/1
150 CAPSULE ORAL 2 TIMES DAILY
Qty: 60 CAPSULE | Refills: 4 | Status: SHIPPED | OUTPATIENT
Start: 2021-09-13 | End: 2022-01-13

## 2021-09-13 NOTE — PROGRESS NOTES
HPI: Augusto Meeks is a 60-year-old M with a medical history significant for hypertension, obesity and persistent atrial fibrillation.       He underwent circumferential pulmonary vein isolation for atrial fibrillation in January 2021, though experienced relief from his symptoms of fatigue and poor stamina for several days only before he reverted back to atrial fibrillation. He continues to have easy fatigability and poor stamina. He was seen in clinic on 5/3/21 with these symptoms, and was started on dronedarone with DCCV to sinus rhythm on 5/21/21.    Was last seen 3 months ago where discussion was had surrounding repeat ablation given he had unfortunately had increased frequency of atrial fibrillation and patient had elected to take a watchful waiting strategy d/t being busy at work. He now presents for repeat follow up with iza noting him to be in persistent atrial fibrillation with 100% burden. Last EF with preserved LV fxn with normal appearing atria (5/7/21).    In discussing with him today, he notes ongoing feelings of easy fatigue. No feelings of palpitations or chest pain. No sob. Just feels like he doesn't have the energy he used to nor the ability to complete all activities as well as he wants. He continues to work in [employer].    PAST MEDICAL HISTORY:  Past Medical History:   Diagnosis Date     A-fib (H) 2019     Arrhythmia      Hypertension 1/20     Irregular heart beat      Sleep apnea        CURRENT MEDICATIONS:  Current Outpatient Medications   Medication Sig Dispense Refill     amLODIPine (NORVASC) 5 MG tablet Take 1 tablet (5 mg) by mouth daily 90 tablet 3     metoprolol succinate ER (TOPROL-XL) 50 MG 24 hr tablet Take 1 tablet (50 mg) by mouth daily 90 tablet 1     sildenafil (REVATIO) 20 MG tablet Take 1-5 tablets one hour before intercourse. 20 tablet 1       PAST SURGICAL HISTORY:  Past Surgical History:   Procedure Laterality Date     ANESTHESIA CARDIOVERSION N/A 9/25/2019     Procedure: Anesthesia Coverage Transesophageal Echocardiogram, Cardioversion @0930;  Surgeon: GENERIC ANESTHESIA PROVIDER;  Location: [facility] OR     ANESTHESIA CARDIOVERSION N/A 10/30/2019    Procedure: ANESTHESIA, FOR CARDIOVERSION @1100;  Surgeon: GENERIC ANESTHESIA PROVIDER;  Location: [facility] OR     ANESTHESIA CARDIOVERSION N/A 12/5/2019    Procedure: CARDIOVERSION;  Surgeon: GENERIC ANESTHESIA PROVIDER;  Location: [facility] OR     ANESTHESIA CARDIOVERSION N/A 5/21/2021    Procedure: ANESTHESIA, FOR CARDIOVERSION @1330;  Surgeon: GENERIC ANESTHESIA PROVIDER;  Location:  OR     CARDIAC SURGERY  2/22/21    Ablaution     CARDIOVERSION  10/30/2019    Patient reported he had a cardioverson on 09/25/2019.     EP ABLATION FOCAL AFIB N/A 1/22/2021    Procedure: EP ABLATION FOCAL AFIB;  Surgeon: Vanessa Clement MD;  Location:  HEART CARDIAC CATH LAB     VASECTOMY         ALLERGIES:     Allergies   Allergen Reactions     Lisinopril Cough       FAMILY HISTORY:  - Premature coronary artery disease  - Atrial fibrillation  - Sudden cardiac death     SOCIAL HISTORY:  Social History     Tobacco Use     Smoking status: Former Smoker     Packs/day: 0.00     Years: 20.00     Pack years: 0.00     Types: Cigars     Start date: 12/12/1979     Quit date: 3/3/2011     Years since quitting: 10.5     Smokeless tobacco: Never Used     Tobacco comment: occasional cigar   Substance Use Topics     Alcohol use: Yes     Comment: occas      Drug use: Yes     Types: Marijuana     Comment: occas        ROS:   Constitutional: No fever, chills, or sweats. Weight stable.   ENT: No visual disturbance, ear ache, epistaxis, sore throat.   Cardiovascular: As per HPI.   Respiratory: No cough, hemoptysis.    GI: No nausea, vomiting, hematemesis, melena, or hematochezia.   : No hematuria.   Integument: Negative.   Psychiatric: Negative.   Hematologic:  Easy bruising, no easy bleeding.  Neuro: Negative.   Endocrinology: No significant heat or cold intolerance    Musculoskeletal: No myalgia.    Exam:  BP (!) 152/91 (BP Location: Right arm, Patient Position: Chair, Cuff Size: Adult Large)   Pulse 78   Wt 114.8 kg (253 lb)   SpO2 98%   BMI 34.31 kg/m    GENERAL APPEARANCE: well-developed, well-appearing male in NAD, polite and conversational  HEENT: at/nc, eomi, mmm, sclera anicteric, no oral ulcerations, no xanthelasmas   NECK: no adenopathy, thyroid normal to palpation, JVP not elevated  RESPIRATORY: CTAB on RA, nonlabored, no accessory muscle use  CARDIOVASCULAR: irr irr, s1, s2, no murmur, rub or gallop, no s3 or s4  ABDOMEN: obese, soft, nt/nd, bs+  EXTREMITIES: no gross deformity, adequate muscle bulk, no edema  NEURO: alert, interactive, speech fluent, grossly nonfocal, moving all 4  VASC: Radial and DP pulses are normal in volumes and symmetric bilaterally  SKIN: no ecchymoses, no rashes on exposed skin      Labs:  CBC RESULTS:   Lab Results   Component Value Date    WBC 5.3 01/22/2021    RBC 3.88 (L) 01/22/2021    HGB 12.9 (L) 01/22/2021    HCT 37.3 (L) 01/22/2021    MCV 96 01/22/2021    MCH 33.2 (H) 01/22/2021    MCHC 34.6 01/22/2021    RDW 14.9 01/22/2021     (L) 01/22/2021       BMP RESULTS:  Lab Results   Component Value Date     01/22/2021    POTASSIUM 4.3 05/21/2021    CHLORIDE 108 01/22/2021    CO2 25 01/22/2021    ANIONGAP 6 01/22/2021     (H) 01/22/2021    BUN 13 01/22/2021    CR 0.82 01/22/2021    GFRESTIMATED >90 01/22/2021    GFRESTBLACK >90 01/22/2021    CHALINO 8.7 01/22/2021        INR RESULTS:  Lab Results   Component Value Date    INR 1.39 (H) 09/25/2019       Procedures:      Assessment and Plan:   Augusto Meeks is a 60-year-old M with a medical history significant for hypertension, obesity and persistent atrial fibrillation.    #Persistent atrial fibrillation, CHADSVASC 1 (HTN)  Patient presenting for follow up of atrial fibrillation with 100% burden via recent ziopatch. Given the patient's age, would recommend for repeat  ablation at this point in effort to ameliorate long term effects of atrial fibrillation. I explained to patient that risks of EP study and ablation procedure include, but are not limited to vascular injury, excessive bleeding requiring blood transfusion, aortic injury, cardiac tamponade requiring pericardiocentesis or open heart surgery, TIA, stroke, esophageal injury, pulmonary vein stenosis or death. Patient understood the risks and decided to proceed with procedure.     -DOT and CT scan day prior  -start pradaxa 2 weeks prior; we will use pradaxa for anticoagulation due to the availability of a reversal agent at the [facility]  -will plan for repeat PVI afib ablation under general anesthesia pending patient schedule    The patient was seen and discussed with Dr. Clement, who agrees with the above assessment and plan.    Tavia Mckinney MD  Cardiology Fellow PGY4  P: [phone]      I have seen, interviewed, and examined patient. I have reviewed the laboratory tests, imaging, and other investigations. I have reviewed the management plan with the patient. I discussed with the team and agree with the findings and plan in this resident/fellow/nurse practitioner's note. In addition, changes in the physical examination, assessment and plan have been incorporated into the note by myself, as to make it a single cohesive document.       Vanessa Clement MD, MS  Cardiology/Cardiac EP Attending Staff            CC  Patient Care Team:  Louann Ramirez APRN CNP as PCP - General (Nurse Practitioner - Family)  Louann Ramirez APRN CNP as Assigned PCP  Vanessa Clement MD as Assigned Heart and Vascular Provider  Lacy New MD as Assigned Neuroscience Provider  Hiram Covarrubias MD as MD (Critical Care)  Vanessa Clement MD as Referring Physician (Clinical Cardiac Electrophysiology)  Hiram Covarrubias MD as Assigned Pulmonology Provider

## 2021-09-13 NOTE — LETTER
9/13/2021      RE: Augusto Meeks  [address] [facility] [address]       Dear Colleague,    Thank you for the opportunity to participate in the care of your patient, Augusto Meeks, at the [facility]. Please see a copy of my visit note below.    HPI: Augusto Meeks is a 60-year-old M with a medical history significant for hypertension, obesity and persistent atrial fibrillation.       He underwent circumferential pulmonary vein isolation for atrial fibrillation in January 2021, though experienced relief from his symptoms of fatigue and poor stamina for several days only before he reverted back to atrial fibrillation. He continues to have easy fatigability and poor stamina. He was seen in clinic on 5/3/21 with these symptoms, and was started on dronedarone with DCCV to sinus rhythm on 5/21/21.    Was last seen 3 months ago where discussion was had surrounding repeat ablation given he had unfortunately had increased frequency of atrial fibrillation and patient had elected to take a watchful waiting strategy d/t being busy at work. He now presents for repeat follow up with iza noting him to be in persistent atrial fibrillation with 100% burden. Last EF with preserved LV fxn with normal appearing atria (5/7/21).    In discussing with him today, he notes ongoing feelings of easy fatigue. No feelings of palpitations or chest pain. No sob. Just feels like he doesn't have the energy he used to nor the ability to complete all activities as well as he wants. He continues to work in [employer].    PAST MEDICAL HISTORY:  Past Medical History:   Diagnosis Date     A-fib (H) 2019     Arrhythmia      Hypertension 1/20     Irregular heart beat      Sleep apnea        CURRENT MEDICATIONS:  Current Outpatient Medications   Medication Sig Dispense Refill     amLODIPine (NORVASC) 5 MG tablet Take 1 tablet (5 mg) by mouth daily 90  tablet 3     metoprolol succinate ER (TOPROL-XL) 50 MG 24 hr tablet Take 1 tablet (50 mg) by mouth daily 90 tablet 1     sildenafil (REVATIO) 20 MG tablet Take 1-5 tablets one hour before intercourse. 20 tablet 1       PAST SURGICAL HISTORY:  Past Surgical History:   Procedure Laterality Date     ANESTHESIA CARDIOVERSION N/A 9/25/2019    Procedure: Anesthesia Coverage Transesophageal Echocardiogram, Cardioversion @0930;  Surgeon: GENERIC ANESTHESIA PROVIDER;  Location:  OR     ANESTHESIA CARDIOVERSION N/A 10/30/2019    Procedure: ANESTHESIA, FOR CARDIOVERSION @1100;  Surgeon: GENERIC ANESTHESIA PROVIDER;  Location: [facility] OR     ANESTHESIA CARDIOVERSION N/A 12/5/2019    Procedure: CARDIOVERSION;  Surgeon: GENERIC ANESTHESIA PROVIDER;  Location:  OR     ANESTHESIA CARDIOVERSION N/A 5/21/2021    Procedure: ANESTHESIA, FOR CARDIOVERSION @1330;  Surgeon: GENERIC ANESTHESIA PROVIDER;  Location:  OR     CARDIAC SURGERY  2/22/21    Ablaution     CARDIOVERSION  10/30/2019    Patient reported he had a cardioverson on 09/25/2019.     EP ABLATION FOCAL AFIB N/A 1/22/2021    Procedure: EP ABLATION FOCAL AFIB;  Surgeon: Vanessa Clement MD;  Location:  HEART CARDIAC CATH LAB     VASECTOMY         ALLERGIES:     Allergies   Allergen Reactions     Lisinopril Cough       FAMILY HISTORY:  - Premature coronary artery disease  - Atrial fibrillation  - Sudden cardiac death     SOCIAL HISTORY:  Social History     Tobacco Use     Smoking status: Former Smoker     Packs/day: 0.00     Years: 20.00     Pack years: 0.00     Types: Cigars     Start date: 12/12/1979     Quit date: 3/3/2011     Years since quitting: 10.5     Smokeless tobacco: Never Used     Tobacco comment: occasional cigar   Substance Use Topics     Alcohol use: Yes     Comment: occas      Drug use: Yes     Types: Marijuana     Comment: occas        ROS:   Constitutional: No fever, chills, or sweats. Weight stable.   ENT: No visual disturbance, ear ache, epistaxis, sore  throat.   Cardiovascular: As per HPI.   Respiratory: No cough, hemoptysis.    GI: No nausea, vomiting, hematemesis, melena, or hematochezia.   : No hematuria.   Integument: Negative.   Psychiatric: Negative.   Hematologic:  Easy bruising, no easy bleeding.  Neuro: Negative.   Endocrinology: No significant heat or cold intolerance   Musculoskeletal: No myalgia.    Exam:  BP (!) 152/91 (BP Location: Right arm, Patient Position: Chair, Cuff Size: Adult Large)   Pulse 78   Wt 114.8 kg (253 lb)   SpO2 98%   BMI 34.31 kg/m    GENERAL APPEARANCE: well-developed, well-appearing male in NAD, polite and conversational  HEENT: at/nc, eomi, mmm, sclera anicteric, no oral ulcerations, no xanthelasmas   NECK: no adenopathy, thyroid normal to palpation, JVP not elevated  RESPIRATORY: CTAB on RA, nonlabored, no accessory muscle use  CARDIOVASCULAR: irr irr, s1, s2, no murmur, rub or gallop, no s3 or s4  ABDOMEN: obese, soft, nt/nd, bs+  EXTREMITIES: no gross deformity, adequate muscle bulk, no edema  NEURO: alert, interactive, speech fluent, grossly nonfocal, moving all 4  VASC: Radial and DP pulses are normal in volumes and symmetric bilaterally  SKIN: no ecchymoses, no rashes on exposed skin      Labs:  CBC RESULTS:   Lab Results   Component Value Date    WBC 5.3 01/22/2021    RBC 3.88 (L) 01/22/2021    HGB 12.9 (L) 01/22/2021    HCT 37.3 (L) 01/22/2021    MCV 96 01/22/2021    MCH 33.2 (H) 01/22/2021    MCHC 34.6 01/22/2021    RDW 14.9 01/22/2021     (L) 01/22/2021       BMP RESULTS:  Lab Results   Component Value Date     01/22/2021    POTASSIUM 4.3 05/21/2021    CHLORIDE 108 01/22/2021    CO2 25 01/22/2021    ANIONGAP 6 01/22/2021     (H) 01/22/2021    BUN 13 01/22/2021    CR 0.82 01/22/2021    GFRESTIMATED >90 01/22/2021    GFRESTBLACK >90 01/22/2021    CHALINO 8.7 01/22/2021        INR RESULTS:  Lab Results   Component Value Date    INR 1.39 (H) 09/25/2019       Procedures:      Assessment and Plan:    Augusto Meeks is a 60-year-old M with a medical history significant for hypertension, obesity and persistent atrial fibrillation.    #Persistent atrial fibrillation, CHADSVASC 1 (HTN)  Patient presenting for follow up of atrial fibrillation with 100% burden via recent ziopatch. Given the patient's age, would recommend for repeat ablation at this point in effort to ameliorate long term effects of atrial fibrillation. I explained to patient that risks of EP study and ablation procedure include, but are not limited to vascular injury, excessive bleeding requiring blood transfusion, aortic injury, cardiac tamponade requiring pericardiocentesis or open heart surgery, TIA, stroke, esophageal injury, pulmonary vein stenosis or death. Patient understood the risks and decided to proceed with procedure.     -DOT and CT scan day prior  -start pradaxa 2 weeks prior; we will use pradaxa for anticoagulation due to the availability of a reversal agent at the [facility]  -will plan for repeat PVI afib ablation under general anesthesia pending patient schedule    The patient was seen and discussed with Dr. Clement, who agrees with the above assessment and plan.    Tavia Mckinney MD  Cardiology Fellow PGY4  P: [phone]      I have seen, interviewed, and examined patient. I have reviewed the laboratory tests, imaging, and other investigations. I have reviewed the management plan with the patient. I discussed with the team and agree with the findings and plan in this resident/fellow/nurse practitioner's note. In addition, changes in the physical examination, assessment and plan have been incorporated into the note by myself, as to make it a single cohesive document.     Vanessa Clement MD, MS  Cardiology/Cardiac EP Attending Staff    CC  Patient Care Team:  Louann Ramirez, APRN CNP as PCP - General (Nurse Practitioner - Family)  Lacy New MD as Assigned Neuroscience Provider  Hiram Covarrubias,  MD as MD (Critical Care)

## 2021-09-13 NOTE — NURSING NOTE
Chief Complaint   Patient presents with     RECHECK     3 month follow up.        Initial BP (!) 152/91 (BP Location: Right arm, Patient Position: Chair, Cuff Size: Adult Large)   Pulse 78   Wt 114.8 kg (253 lb)   SpO2 98%   BMI 34.31 kg/m   Estimated body mass index is 34.31 kg/m  as calculated from the following:    Height as of 3/3/21: 1.829 m (6').    Weight as of this encounter: 114.8 kg (253 lb)..  BP completed using cuff size: bravo Hough MA

## 2021-09-13 NOTE — PATIENT INSTRUCTIONS
Thank you for coming to the [facility] [place]; please note the following instructions:    1. You are scheduled for an Atrial Fibrillation Ablation, at [facility] [place], with Dr. Clement. The hospital is located at [address] [place] [address] on the East bank of the campus.     You will need to undergo a COVID-19 PCR swab test within 4 days of procedure. You will receive a phone call with more information. If you do not hear from the COVID scheduling team, please call: [phone] to schedule.    Pre-Anesthesia Phone Call will occur 1-2 days prior to procedure date.  You do not need to come to the [facility].    Please disregard any automated, reminder phone calls regarding arrival times. Follow the below arrival times.     START PRADAXA 150 MG TWICE DAILY 1 WEEK PRIOR TO THE ABLATION AND CONTINUE FOR 3 MONTHS AFTER.  WE ARE CURRENTLY WORKING ON COVERAGE.  THIS MEDICATION IS NOT ON FORMULARY.     Date:   DOT (transesophageal echocardiogram), CT Angiogram  _______: Arrive to the [facility] Waiting Room for your: CT & DOT     -If there is evidence of a clot in your heart on the DOT,  the procedure will be canceled.     DOT Instructions:  1. Nothing to eat or drink 6 hours prior.  2. No additional medication instructions.  3. You will receive sedation for the DOT so you will need someone to drive you home from the hospital and stay with you for 6 hours. Do not make any legal decisions for 24 hours.     CT Angiogram Instructions:  1. No food and drink restrictions. The day before your exam, drink extra fluids-at least six 8-ounce glasses (unless you follow a fluid-restricted diet).  2. Avoid caffeine, smoking, or strenuous activity on the day of the test.  3. You will receive contrast, so plan to drink extra water the day before and after your test.  4. If you take any of the following medications, please HOLD the day of:   * Metformin or Glucophage the morning of and wait  48 hours before re-starting   * Diuretic the day of. (Examples: Furosemide (Lasix), Torsemide, Bumetanide (Bumex), Metolazone (Zaroxolyn) and Hydrochlorothiazide, Spironolactone)   * NSAIDS (Examples: ibuprofen/Advil/Motrin, naproxen/Aleve) the day of.   * Erectile dysfunction medications (Examples: Viagra, Cialis, Levitra).  5. Please allow 1 hour of time for preparation and testing. You are on the table for ~15 minutes. Your feet go in first, your head is not enclosed.          Date:   Time: ________________to [address] at the [facility]  Atrial Fibrillation Ablation Procedure     1. Please review the attached instructions on showering before your procedure at the end of this letter.  2. Your history and physical will be completed by our nurse practitioner when you arrive.  3. Please do not eat anything for 8 hours prior to your procedure. You may have sips of water up until 2 hours prior to your arrival.  4. If you are diabetic follow the following instructions: (Contact your diabetes team if you have questions)   * Hold oral diabetic medications and short-acting insulins (aspart, lispro, regular) the morning of the procedure.              * Hold non-insulin injectable liraglutide (Victoza) the evening before and/or morning of the procedure.   * Hold mixed insulins (70/30, 75/25, 50/50) the morning of procedure. Instead, take 66% of NPH (N) component.   * Take 80% of your normal long-acting insulin (glargine/Lantus or levemir/Detemir, degludec/Tresiba) the evening before and/or morning of the procedure.  5. The morning of your procedure, you may take your scheduled medications with a sip of water - continue your blood thinner (warfarin, Pradaxa, Eliquis, Xarelto).  HOLD:  - Diuretic (if taking) day of for comfort   6. If the imaging shows a clot in your heart, the procedure will be canceled.   7. You will receive general anesthesia for this procedure.   8. You will likely discharge the same day and need a  .      Post-Procedure Instructions  Care of groin site:    Remove the Band-Aid after 24 hours. If there is minor oozing, apply another Band-aid and remove it after 12 hours.     Do NOT take a bath, use a hot tub, pool, or submerse in water for at least 3 days. You may shower.     It is normal to have a small bruise or lump at the site.    Do not scrub the site.    Do not use lotion or powder near the puncture site for 3 days.    If you start bleeding from the site in your groin: Lie down flat and press firmly on the site. Call your physician immediately, or, come to the emergency room.  Call 911 right away if you have bleeding that is heavy or does not stop.    Call your doctor/provider if:     You have a large or growing hard lump around the site.     The site is red, swollen, hot or tender.     Blood or fluid is draining from the site.     You have chills or a fever greater than 101 F (38 C).     Your leg or arm turns bluish, feels numb or cool.     You have hives, a rash or unusual itching.      Activity Restrictions    For the first 2 days: Do not stoop or squat. When you cough, sneeze or move your bowels, hold your hand over the puncture site and press gently.    Do not lift more than 10 pounds or exertional activity for 10 days.  - No driving for 24 hours after (with or without general anesthesia).    DATE: ________zio heart monitor to be worn for 2 weeks    Date: _______ Follow up with Dr. Clement            Please do not hesitate to utilize [employer] or call us if you have any questions or concerns.    Beronica Montoya, ISREAL  Electrophysiology Nurse Coordinator  [phone]    [facility]   [phone]      Showering Before Surgery   Your surgeon has asked you to take 2 showers before surgery.  Why is this important?  It is normal for bacteria (germs) to be on your skin. The skin protects us from these germs. When you have surgery, we cut the skin. Sometimes germs get into the cuts and  cause infection (illness caused by germs). By following the instructions below and using special soap, you will lower the number of germs on your skin. This decreases your chance of infection.  Special soap  Buy or get 8 ounces of antiseptic surgical soap called 4% CHG. Common name brands of this soap are Hibiclens and Exidine.   You can find it at your local pharmacy, clinic or retail store. If you have trouble, ask your pharmacist to help you find the right substitute.   A note about shaving:  Do not shave within 12 inches of your incision (surgical cut) area for at least 3 days before surgery. Shaving can make small cuts in the skin. This puts you at a higher risk of infection.  Items you will need for each shower:    1 newly washed towel    4 ounces of one of the above soaps  Follow these instructions:  The evening before surgery   1. Wash your hair and body with your regular shampoo and soap. Make sure you rinse the shampoo and soap from your hair and body.   2. Using clean hands, apply about 2 ounces of soap gently on your skin from the neck to your toes. Use on your groin area last. Do not use this soap on your face or head. If you get any soap in your eyes, ears or mouth, rinse right away.   3. Repeat step 2. It is very important to let the soap stay on your skin for at least 1 minute.   4. Rinse well and dry off using a clean towel.If you feel any tingling, itching or other irritation, rinse right away. It is normal to feel some coolness on the skin after using the antiseptic soap. Your skin may feel a bit dry after the shower, but do not use any lotions, creams or moisturizers. Do not use hair spray or other products in your hair.  5. Dress in freshly washed clothes or pajamas. Use fresh pillowcases and sheets on your bed.    The morning of surgery  1. Wash your hair and body with your regular shampoo and soap. Make sure you rinse the shampoo and soap from your hair and body.   2. Using clean hands, apply  about 2 ounces of soap gently on your skin from the neck to your toes. Use on your groin area last. Do not use this soap on your face or head. If you get any soap in your eyes, ears or mouth, rinse right away.   3. Repeat step 2. It is very important to let the soap stay on your skin for at least 1 minute.   4. Rinse well and dry off using a clean towel.If you feel any tingling, itching or other irritation, rinse right away. It is normal to feel some coolness on the skin after using the antiseptic soap. Your skin may feel a bit dry after the shower, but do not use any lotions, creams or moisturizers. Do not use hair spray or other products in your hair.  5. Dress in clean clothes.  If you have any questions about showering or an allergy to CHG soap, please call the Preadmissions Nursing Department at the hospital where you are having your surgery.  [facility]): [phone]  This phone number will be answered between the hours of 8:00 a.m. and 6:30 p.m. Monday through Friday.                    If you have any questions regarding your visit please contact your care team:     Cardiology  Telephone Number   Beronica HINKLE, RN  Delisa TIRADO, RN   Lyric PARRISH, MAYRA SOLIS, MAYRA TRAN, LPN   [phone] (option 1)   For scheduling appts:     [phone] (select option 1)       For the Device Clinic (Pacemakers and ICD's)  RN's :  Arleth Scanlon   During business hours: [phone]    *After business hours:  [phone] (select option 4)      Normal test result notifications will be released via [employer] or mailed within 7 business days.  All other test results, will be communicated via telephone once reviewed by your cardiologist.    If you need a medication refill please contact your pharmacy.  Please allow 3 business days for your refill to be completed.    As always, thank you for trusting us with your health care needs!

## 2021-09-14 ENCOUNTER — TELEPHONE (OUTPATIENT)
Dept: CARDIOLOGY | Facility: CLINIC | Age: 61
End: 2021-09-14

## 2021-09-14 RX ORDER — LIDOCAINE 40 MG/G
CREAM TOPICAL
Status: CANCELLED | OUTPATIENT
Start: 2021-09-14

## 2021-09-14 NOTE — TELEPHONE ENCOUNTER
Pradaxa on on formulary.  Writer initiated medication coverage approval.  Form completed and faxed back to [employer].  Will await for the outcome.    Beronica Montoya RN  Cardiology Care Coordinator  [facility]  [phone] option 1

## 2021-09-15 ENCOUNTER — OFFICE VISIT (OUTPATIENT)
Dept: FAMILY MEDICINE | Facility: CLINIC | Age: 61
End: 2021-09-15
Payer: COMMERCIAL

## 2021-09-15 ENCOUNTER — MYC MEDICAL ADVICE (OUTPATIENT)
Dept: CARDIOLOGY | Facility: CLINIC | Age: 61
End: 2021-09-15

## 2021-09-15 VITALS
TEMPERATURE: 98.2 F | BODY MASS INDEX: 34.04 KG/M2 | OXYGEN SATURATION: 100 % | DIASTOLIC BLOOD PRESSURE: 78 MMHG | SYSTOLIC BLOOD PRESSURE: 138 MMHG | HEART RATE: 79 BPM | WEIGHT: 251 LBS

## 2021-09-15 DIAGNOSIS — H69.93 DYSFUNCTION OF BOTH EUSTACHIAN TUBES: Primary | ICD-10-CM

## 2021-09-15 PROCEDURE — 99213 OFFICE O/P EST LOW 20 MIN: CPT | Performed by: PHYSICIAN ASSISTANT

## 2021-09-15 RX ORDER — CEFUROXIME AXETIL 500 MG/1
TABLET ORAL
COMMUNITY
Start: 2021-09-09 | End: 2021-11-17

## 2021-09-15 RX ORDER — FLUTICASONE PROPIONATE 50 MCG
1 SPRAY, SUSPENSION (ML) NASAL DAILY
Qty: 16 G | Refills: 3 | Status: SHIPPED | OUTPATIENT
Start: 2021-09-15 | End: 2022-03-07

## 2021-09-15 NOTE — PROGRESS NOTES
"    Assessment & Plan     Dysfunction of both eustachian tubes  No infection. He has been treated with antibiotic x 2.   Start daily antihistamine decongestant with flonase.   Side effects and how to take the medication discussed.  Plan to schedule an appointment with ENT within the next week.   ? If he is a candidate for tubes if current treatment is not working  - loratadine-pseudoePHEDrine (CLARITIN-D 24-HOUR)  MG 24 hr tablet; Take 1 tablet by mouth daily  - fluticasone (FLONASE) 50 MCG/ACT nasal spray; Spray 1 spray into both nostrils daily  - Otolaryngology Referral; Future          Return in about 1 week (around 9/22/2021) for specialty.    Kristen M. Kehr, PA-C [facility]   Chandu is a 60 year old who presents for the following health issues     Chandu has plugged ears.   He was seen virtually on 9/2 and treated with amoxicillin   After completing the course of antibiotic, he the was seen in [facility] since symptoms did not improve.   Antibiotic changed to ceftin.   He has now completed 7 days of the ceftin and still no improvement.   He does not have pain, but there is a constant plugged, \"under water\" sensation.   No drainage.   He has not had a significant amount of nasal congestion. No fever.   He typically will use an antihistamine only when needed.     HPI     Check both ear possible still having infection, worse on left ear ongoing for 2 weeks.  See above.     Review of Systems   Constitutional, HEENT, cardiovascular, pulmonary, GI, , musculoskeletal, neuro, skin, endocrine and psych systems are negative, except as otherwise noted.      Objective    /78   Pulse 79   Temp 98.2  F (36.8  C) (Tympanic)   Wt 113.9 kg (251 lb)   SpO2 100%   BMI 34.04 kg/m    Body mass index is 34.04 kg/m .  Physical Exam   GENERAL: healthy, alert and no distress  EYES: Eyes grossly normal to inspection, PERRL and conjunctivae and sclerae normal  HENT: normal " cephalic/atraumatic, both ears: canals are clear, TM is retracted, clear fluid behind both TMs. No redness of the TMs.  nose and mouth without ulcers or lesions, oropharynx clear and oral mucous membranes moist  NECK: no adenopathy, no asymmetry, masses, or scars and thyroid normal to palpation  MS: no gross musculoskeletal defects noted, no edema  SKIN: no suspicious lesions or rashes  PSYCH: mentation appears normal, affect normal/bright

## 2021-09-15 NOTE — NURSING NOTE
Chief Complaint   Patient presents with     Otitis Media       Initial /78   Pulse 79   Temp 98.2  F (36.8  C) (Tympanic)   Wt 113.9 kg (251 lb)   SpO2 100%   BMI 34.04 kg/m   Estimated body mass index is 34.04 kg/m  as calculated from the following:    Height as of 3/3/21: 1.829 m (6').    Weight as of this encounter: 113.9 kg (251 lb).  Medication Reconciliation: anita Enamorado MA

## 2021-09-15 NOTE — TELEPHONE ENCOUNTER
Letter received from [facility] reporting PRadaxa coverage has been approved.    Letter will be abstracted.  Patient aware. Please see 9/15 mychart encounter.    Beronica Montoya, RN  Cardiology Care Coordinator  [facility]  [phone] option 1

## 2021-09-16 NOTE — TELEPHONE ENCOUNTER
Pt went to the pharmacy to  pradaxa and the pharmacy said there was some sort of hold on it and he didn't know why or what that meant, he would like a call back from Beronica, thank you

## 2021-09-26 ENCOUNTER — HEALTH MAINTENANCE LETTER (OUTPATIENT)
Age: 61
End: 2021-09-26

## 2021-09-30 ENCOUNTER — OFFICE VISIT (OUTPATIENT)
Dept: AUDIOLOGY | Facility: CLINIC | Age: 61
End: 2021-09-30
Payer: COMMERCIAL

## 2021-09-30 ENCOUNTER — OFFICE VISIT (OUTPATIENT)
Dept: OTOLARYNGOLOGY | Facility: CLINIC | Age: 61
End: 2021-09-30
Payer: COMMERCIAL

## 2021-09-30 VITALS
OXYGEN SATURATION: 98 % | DIASTOLIC BLOOD PRESSURE: 84 MMHG | RESPIRATION RATE: 16 BRPM | SYSTOLIC BLOOD PRESSURE: 128 MMHG | HEART RATE: 78 BPM

## 2021-09-30 DIAGNOSIS — H90.3 SENSORINEURAL HEARING LOSS, BILATERAL: Primary | ICD-10-CM

## 2021-09-30 DIAGNOSIS — H90.3 SENSORINEURAL HEARING LOSS (SNHL) OF BOTH EARS: ICD-10-CM

## 2021-09-30 DIAGNOSIS — H93.8X3 PLUGGED FEELING IN EAR, BILATERAL: ICD-10-CM

## 2021-09-30 DIAGNOSIS — H69.93 DYSFUNCTION OF BOTH EUSTACHIAN TUBES: Primary | ICD-10-CM

## 2021-09-30 PROCEDURE — 92557 COMPREHENSIVE HEARING TEST: CPT | Performed by: AUDIOLOGIST

## 2021-09-30 PROCEDURE — 99243 OFF/OP CNSLTJ NEW/EST LOW 30: CPT | Performed by: OTOLARYNGOLOGY

## 2021-09-30 PROCEDURE — 92550 TYMPANOMETRY & REFLEX THRESH: CPT | Performed by: AUDIOLOGIST

## 2021-09-30 PROCEDURE — 99207 PR NO CHARGE LOS: CPT | Performed by: AUDIOLOGIST

## 2021-09-30 RX ORDER — METHYLPREDNISOLONE 4 MG
TABLET, DOSE PACK ORAL
Qty: 21 TABLET | Refills: 0 | Status: SHIPPED | OUTPATIENT
Start: 2021-09-30 | End: 2021-11-17

## 2021-09-30 NOTE — PROGRESS NOTES
I am seeing this patient in consultation for dysfunction of both eustachian tubes at the request of the provider Kristin Kehr.    Chief Complaint - plugged ear    History of Present Illness - Augusto Meeks is a 60 year old male who presents to me today with pressure or a plugged feeling in both ears, worse in the left.  It has been present and noticeable for approximately a few weeks. This was preceded by an upper respiratory infection. he also flew and that didn't help. There is no history of chronic ear disease or ear surgery. The patient denies vertigo, otorrhea, but has some mild otalgia. The patient has tried amoxicillin and another antibiotic, but these things have not helped. It is much better. He can feel fluid in ear. He does pop ears. He has also tried claritin-D and flonase. He has noticed some hearing loss even before this. Not much for noise exposure.     Past Medical History -   Patient Active Problem List   Diagnosis     Advanced directives, counseling/discussion     Hyperlipidemia     New onset atrial fibrillation (H)     Tobacco use disorder     Benign essential hypertension     Persistent atrial fibrillation (H)     Fatigue     COVID-19       Current Medications -   Current Outpatient Medications:      amLODIPine (NORVASC) 5 MG tablet, Take 1 tablet (5 mg) by mouth daily, Disp: 90 tablet, Rfl: 3     cefuroxime (CEFTIN) 500 MG tablet, TAKE 1 TABLET (500 MG TOTAL) BY MOUTH 2 (TWO) TIMES A DAY FOR 10 DAYS. DO NOT CHEW TABLET., Disp: , Rfl:      dabigatran ANTICOAGULANT (PRADAXA ANTICOAGULANT) 150 MG capsule, Take 1 capsule (150 mg) by mouth 2 times daily Store in original 's bottle or blister pack; use within 120 days of opening. (Patient not taking: Reported on 9/15/2021), Disp: 60 capsule, Rfl: 4     fluticasone (FLONASE) 50 MCG/ACT nasal spray, Spray 1 spray into both nostrils daily, Disp: 16 g, Rfl: 3     loratadine-pseudoePHEDrine (CLARITIN-D 24-HOUR)  MG 24 hr tablet, Take 1  tablet by mouth daily, Disp: 30 tablet, Rfl: 0     metoprolol succinate ER (TOPROL-XL) 50 MG 24 hr tablet, Take 1 tablet (50 mg) by mouth daily, Disp: 90 tablet, Rfl: 1     sildenafil (REVATIO) 20 MG tablet, Take 1-5 tablets one hour before intercourse., Disp: 20 tablet, Rfl: 1    Allergies -   Allergies   Allergen Reactions     Lisinopril Cough       Social History -   Social History     Socioeconomic History     Marital status:      Spouse name: Not on file     Number of children: Not on file     Years of education: Not on file     Highest education level: Not on file   Occupational History     Not on file   Tobacco Use     Smoking status: Former Smoker     Packs/day: 0.00     Years: 20.00     Pack years: 0.00     Types: Cigars     Start date: 12/12/1979     Quit date: 3/3/2011     Years since quitting: 10.5     Smokeless tobacco: Never Used     Tobacco comment: occasional cigar   Vaping Use     Vaping Use: Never used   Substance and Sexual Activity     Alcohol use: Yes     Comment: occas      Drug use: Yes     Types: Marijuana     Comment: occas      Sexual activity: Yes     Partners: Female     Birth control/protection: Male Surgical, Female Surgical   Other Topics Concern     Parent/sibling w/ CABG, MI or angioplasty before 65F 55M? No   Social History Narrative     Not on file     Social Determinants of Health     Financial Resource Strain:      Difficulty of Paying Living Expenses:    Food Insecurity:      Worried About Running Out of Food in the Last Year:      Ran Out of Food in the Last Year:    Transportation Needs:      Lack of Transportation (Medical):      Lack of Transportation (Non-Medical):    Physical Activity:      Days of Exercise per Week:      Minutes of Exercise per Session:    Stress:      Feeling of Stress :    Social Connections:      Frequency of Communication with Friends and Family:      Frequency of Social Gatherings with Friends and Family:      Attends [employer] Services:       Active Member of Clubs or Organizations:      Attends Club or Organization Meetings:      Marital Status:    Intimate Partner Violence:      Fear of Current or Ex-Partner:      Emotionally Abused:      Physically Abused:      Sexually Abused:        Family History -   Family History   Problem Relation Age of Onset     Lung Cancer Mother      Cancer Father      Review of Systems - As per HPI and PMHx, otherwise 7 system review of the head and neck is negative.    Physical Exam  /84   Pulse 78   Resp 16   SpO2 98%   General - The patient is nontoxic, in no distress.  Alert and oriented to person and place, answers questions and cooperates with examination appropriately.   Voice and Breathing - The patient was breathing comfortably without the use of accessory muscles. There was no wheezing, stridor, or stertor.  The patients voice was clear and strong.  Ears - clean canals. The tympanic membrane on the right is intact, not retracted, no obvious middle ear effusion. No acute infection.  No fluid or purulence was seen in the external canal. The tympanic membrane on the left is intact, no middle ear effusion. No acute infection.  No fluid or purulence was seen in the external canal.   Nose - Septum midline. Turbinates normal size. Airway patent bilaterally. No polyps, masses, or pus.   Eyes - Extraocular movements intact. Sclera were not icteric or injected.  Neck - Soft, non-tender. Palpation of the occipital, submental, submandibular, internal jugular chain, and supraclavicular nodes did not demonstrate any abnormal lymph nodes or masses. No parotid masses. Palpation of the thyroid was soft and smooth, with no nodules or goiter appreciated.  The trachea was mobile and midline.  Neurological - Cranial nerves 2 through 12 were grossly intact. House-Brackmann grade 1 out of 6 bilaterally.      Audiologic Studies - An audiogram and tympanogram were performed today as part of the evaluation and personally reviewed.  The tympanogram shows a type A, low volume on both sides, some negative pressure.  The audiogram showed some down-sloping mild to moderate sensorineural hearing loss.       Assessment and Plan - Augusto Meeks is a 60 year old male who presents to me today with ear plugging.  This is most consistent with eustachian tube dysfunction and possibly OME when this started. He is much better, but not completely back. I have advised continuing claritin-D, daily valsalva, and a medrol dose pack. Return as needed.    Masoud Chavez MD  Otolaryngology  [facility]

## 2021-09-30 NOTE — LETTER
9/30/2021         RE: Augusto Meeks  [address] [address] [facility] [id number]        Dear Colleague,    Thank you for referring your patient, Augusto Meeks, to the [facility]. Please see a copy of my visit note below.    I am seeing this patient in consultation for dysfunction of both eustachian tubes at the request of the provider Kristin Kehr.    Chief Complaint - plugged ear    History of Present Illness - Augusto Meeks is a 60 year old male who presents to me today with pressure or a plugged feeling in both ears, worse in the left.  It has been present and noticeable for approximately a few weeks. This was preceded by an upper respiratory infection. he also flew and that didn't help. There is no history of chronic ear disease or ear surgery. The patient denies vertigo, otorrhea, but has some mild otalgia. The patient has tried amoxicillin and another antibiotic, but these things have not helped. It is much better. He can feel fluid in ear. He does pop ears. He has also tried claritin-D and flonase. He has noticed some hearing loss even before this. Not much for noise exposure.     Past Medical History -   Patient Active Problem List   Diagnosis     Advanced directives, counseling/discussion     Hyperlipidemia     New onset atrial fibrillation (H)     Tobacco use disorder     Benign essential hypertension     Persistent atrial fibrillation (H)     Fatigue     COVID-19       Current Medications -   Current Outpatient Medications:      amLODIPine (NORVASC) 5 MG tablet, Take 1 tablet (5 mg) by mouth daily, Disp: 90 tablet, Rfl: 3     cefuroxime (CEFTIN) 500 MG tablet, TAKE 1 TABLET (500 MG TOTAL) BY MOUTH 2 (TWO) TIMES A DAY FOR 10 DAYS. DO NOT CHEW TABLET., Disp: , Rfl:      dabigatran ANTICOAGULANT (PRADAXA ANTICOAGULANT) 150 MG capsule, Take 1 capsule (150 mg) by mouth 2 times daily Store in original 's bottle or blister pack; use within 120 days of opening. (Patient not  taking: Reported on 9/15/2021), Disp: 60 capsule, Rfl: 4     fluticasone (FLONASE) 50 MCG/ACT nasal spray, Spray 1 spray into both nostrils daily, Disp: 16 g, Rfl: 3     loratadine-pseudoePHEDrine (CLARITIN-D 24-HOUR)  MG 24 hr tablet, Take 1 tablet by mouth daily, Disp: 30 tablet, Rfl: 0     metoprolol succinate ER (TOPROL-XL) 50 MG 24 hr tablet, Take 1 tablet (50 mg) by mouth daily, Disp: 90 tablet, Rfl: 1     sildenafil (REVATIO) 20 MG tablet, Take 1-5 tablets one hour before intercourse., Disp: 20 tablet, Rfl: 1    Allergies -   Allergies   Allergen Reactions     Lisinopril Cough       Social History -   Social History     Socioeconomic History     Marital status:      Spouse name: Not on file     Number of children: Not on file     Years of education: Not on file     Highest education level: Not on file   Occupational History     Not on file   Tobacco Use     Smoking status: Former Smoker     Packs/day: 0.00     Years: 20.00     Pack years: 0.00     Types: Cigars     Start date: 12/12/1979     Quit date: 3/3/2011     Years since quitting: 10.5     Smokeless tobacco: Never Used     Tobacco comment: occasional cigar   Vaping Use     Vaping Use: Never used   Substance and Sexual Activity     Alcohol use: Yes     Comment: occas      Drug use: Yes     Types: Marijuana     Comment: occas      Sexual activity: Yes     Partners: Female     Birth control/protection: Male Surgical, Female Surgical   Other Topics Concern     Parent/sibling w/ CABG, MI or angioplasty before 65F 55M? No   Social History Narrative     Not on file     Social Determinants of Health     Financial Resource Strain:      Difficulty of Paying Living Expenses:    Food Insecurity:      Worried About Running Out of Food in the Last Year:      Ran Out of Food in the Last Year:    Transportation Needs:      Lack of Transportation (Medical):      Lack of Transportation (Non-Medical):    Physical Activity:      Days of Exercise per Week:       Minutes of Exercise per Session:    Stress:      Feeling of Stress :    Social Connections:      Frequency of Communication with Friends and Family:      Frequency of Social Gatherings with Friends and Family:      Attends [employer] Services:      Active Member of Clubs or Organizations:      Attends Club or Organization Meetings:      Marital Status:    Intimate Partner Violence:      Fear of Current or Ex-Partner:      Emotionally Abused:      Physically Abused:      Sexually Abused:        Family History -   Family History   Problem Relation Age of Onset     Lung Cancer Mother      Cancer Father      Review of Systems - As per HPI and PMHx, otherwise 7 system review of the head and neck is negative.    Physical Exam  /84   Pulse 78   Resp 16   SpO2 98%   General - The patient is nontoxic, in no distress.  Alert and oriented to person and place, answers questions and cooperates with examination appropriately.   Voice and Breathing - The patient was breathing comfortably without the use of accessory muscles. There was no wheezing, stridor, or stertor.  The patients voice was clear and strong.  Ears - clean canals. The tympanic membrane on the right is intact, not retracted, no obvious middle ear effusion. No acute infection.  No fluid or purulence was seen in the external canal. The tympanic membrane on the left is intact, no middle ear effusion. No acute infection.  No fluid or purulence was seen in the external canal.   Nose - Septum midline. Turbinates normal size. Airway patent bilaterally. No polyps, masses, or pus.   Eyes - Extraocular movements intact. Sclera were not icteric or injected.  Neck - Soft, non-tender. Palpation of the occipital, submental, submandibular, internal jugular chain, and supraclavicular nodes did not demonstrate any abnormal lymph nodes or masses. No parotid masses. Palpation of the thyroid was soft and smooth, with no nodules or goiter appreciated.  The trachea was mobile  and midline.  Neurological - Cranial nerves 2 through 12 were grossly intact. House-Brackmann grade 1 out of 6 bilaterally.      Audiologic Studies - An audiogram and tympanogram were performed today as part of the evaluation and personally reviewed. The tympanogram shows a type A, low volume on both sides, some negative pressure.  The audiogram showed some down-sloping mild to moderate sensorineural hearing loss.       Assessment and Plan - Augusto Meeks is a 60 year old male who presents to me today with ear plugging.  This is most consistent with eustachian tube dysfunction and possibly OME when this started. He is much better, but not completely back. I have advised continuing claritin-D, daily valsalva, and a medrol dose pack. Return as needed.    Masoud Chavez MD  Otolaryngology  [facility]        Again, thank you for allowing me to participate in the care of your patient.        Sincerely,        Masoud Chavez MD

## 2021-09-30 NOTE — PROGRESS NOTES
AUDIOLOGY REPORT:    Patient was referred from ENT by Dr. Chavez for audiology evaluation. The patient reports that a month ago he had a cold with plugged ears and then flew on a plane while his ears were still plugged. He reports that his ears have been getting better, noting that his hearing seemed completely down in the left ear and is now about 70% back. The patient notes that the right ear was not as bad and both seem equal now. The patient reports that he had an ear infection, which was treated with antibiotics. He also tried Claritin and nasal spray. The patient reports that he still has occasional ear pressure, but no pain. He reports an occasional history of noise exposure from music and landscaping. The patient reports that he does not have a prior history of ear problems or ear surgery, and he does not have tinnitus or a family history of hearing loss.    Testing:    Otoscopy:   Otoscopic exam indicates ears are clear of cerumen bilaterally     Tympanograms:    RIGHT: normal eardrum mobility     LEFT:   normal eardrum mobility    Reflexes (reported by stimulus ear):  RIGHT: Ipsilateral is absent at frequencies tested  RIGHT: Contralateral is present at elevated levels   LEFT:   Ipsilateral is present at elevated levels   LEFT:   Contralateral is absent at frequencies tested    Thresholds:   Pure Tone Thresholds assessed using conventional audiometry with good reliability from 250-8000 Hz bilaterally using insert earphones and circumaural headphones     RIGHT:  mild to moderate sensorineural hearing loss with normal hearing sensitivity at 250 and 2000 Hz    LEFT:    normal hearing sensitivity through 2000 Hz sloping to mild to moderate sensorineural hearing loss    Speech Reception Threshold:    RIGHT: 25 dB HL    LEFT:   25 dB HL  Results are in agreement with pure tone average.     Word Recognition Score:     RIGHT: 100% at 65 dB HL using NU-6 recorded word list.    LEFT:   100% at 65 dB HL using NU-6  recorded word list.    Discussed results with the patient.     Patient was returned to ENT for follow up.     Luis Angel Fernandez, CCC-A  Licensed Audiologist #[id number]  9/30/2021

## 2021-10-16 DIAGNOSIS — Z11.59 ENCOUNTER FOR SCREENING FOR OTHER VIRAL DISEASES: ICD-10-CM

## 2021-10-17 DIAGNOSIS — Z11.59 ENCOUNTER FOR SCREENING FOR OTHER VIRAL DISEASES: ICD-10-CM

## 2021-10-20 ENCOUNTER — MYC MEDICAL ADVICE (OUTPATIENT)
Dept: FAMILY MEDICINE | Facility: CLINIC | Age: 61
End: 2021-10-20

## 2021-10-20 DIAGNOSIS — N52.9 ERECTILE DYSFUNCTION, UNSPECIFIED ERECTILE DYSFUNCTION TYPE: ICD-10-CM

## 2021-10-20 RX ORDER — SILDENAFIL CITRATE 20 MG/1
TABLET ORAL
Qty: 20 TABLET | Refills: 1 | Status: SHIPPED | OUTPATIENT
Start: 2021-10-20 | End: 2022-02-01

## 2021-11-10 ENCOUNTER — MYC MEDICAL ADVICE (OUTPATIENT)
Dept: CARDIOLOGY | Facility: CLINIC | Age: 61
End: 2021-11-10
Payer: COMMERCIAL

## 2021-11-10 DIAGNOSIS — I10 BENIGN ESSENTIAL HYPERTENSION: ICD-10-CM

## 2021-11-11 RX ORDER — AMLODIPINE BESYLATE 5 MG/1
5 TABLET ORAL DAILY
Qty: 90 TABLET | Refills: 3 | Status: SHIPPED | OUTPATIENT
Start: 2021-11-11 | End: 2022-11-17

## 2021-11-15 ENCOUNTER — LAB (OUTPATIENT)
Dept: LAB | Facility: CLINIC | Age: 61
End: 2021-11-15
Payer: COMMERCIAL

## 2021-11-15 DIAGNOSIS — Z11.59 ENCOUNTER FOR SCREENING FOR OTHER VIRAL DISEASES: ICD-10-CM

## 2021-11-15 PROCEDURE — U0005 INFEC AGEN DETEC AMPLI PROBE: HCPCS

## 2021-11-15 PROCEDURE — U0003 INFECTIOUS AGENT DETECTION BY NUCLEIC ACID (DNA OR RNA); SEVERE ACUTE RESPIRATORY SYNDROME CORONAVIRUS 2 (SARS-COV-2) (CORONAVIRUS DISEASE [COVID-19]), AMPLIFIED PROBE TECHNIQUE, MAKING USE OF HIGH THROUGHPUT TECHNOLOGIES AS DESCRIBED BY CMS-2020-01-R: HCPCS

## 2021-11-16 LAB — SARS-COV-2 RNA RESP QL NAA+PROBE: NEGATIVE

## 2021-11-18 ENCOUNTER — ANESTHESIA EVENT (OUTPATIENT)
Dept: SURGERY | Facility: CLINIC | Age: 61
End: 2021-11-18
Payer: COMMERCIAL

## 2021-11-18 ENCOUNTER — HOSPITAL ENCOUNTER (OUTPATIENT)
Dept: CT IMAGING | Facility: CLINIC | Age: 61
End: 2021-11-18
Attending: INTERNAL MEDICINE
Payer: COMMERCIAL

## 2021-11-18 ENCOUNTER — HOSPITAL ENCOUNTER (OUTPATIENT)
Dept: CARDIOLOGY | Facility: CLINIC | Age: 61
End: 2021-11-18
Attending: INTERNAL MEDICINE
Payer: COMMERCIAL

## 2021-11-18 VITALS
RESPIRATION RATE: 16 BRPM | HEART RATE: 84 BPM | SYSTOLIC BLOOD PRESSURE: 138 MMHG | DIASTOLIC BLOOD PRESSURE: 81 MMHG | OXYGEN SATURATION: 100 %

## 2021-11-18 DIAGNOSIS — I48.19 PERSISTENT ATRIAL FIBRILLATION (H): ICD-10-CM

## 2021-11-18 LAB — LVEF ECHO: NORMAL

## 2021-11-18 PROCEDURE — 250N000011 HC RX IP 250 OP 636: Performed by: STUDENT IN AN ORGANIZED HEALTH CARE EDUCATION/TRAINING PROGRAM

## 2021-11-18 PROCEDURE — 93325 DOPPLER ECHO COLOR FLOW MAPG: CPT

## 2021-11-18 PROCEDURE — 250N000011 HC RX IP 250 OP 636: Performed by: INTERNAL MEDICINE

## 2021-11-18 PROCEDURE — 75572 CT HRT W/3D IMAGE: CPT

## 2021-11-18 PROCEDURE — 93312 ECHO TRANSESOPHAGEAL: CPT | Mod: 26 | Performed by: INTERNAL MEDICINE

## 2021-11-18 PROCEDURE — 75572 CT HRT W/3D IMAGE: CPT | Mod: 26 | Performed by: STUDENT IN AN ORGANIZED HEALTH CARE EDUCATION/TRAINING PROGRAM

## 2021-11-18 PROCEDURE — 99152 MOD SED SAME PHYS/QHP 5/>YRS: CPT | Mod: GC | Performed by: INTERNAL MEDICINE

## 2021-11-18 PROCEDURE — 93320 DOPPLER ECHO COMPLETE: CPT | Mod: 26 | Performed by: INTERNAL MEDICINE

## 2021-11-18 PROCEDURE — 250N000009 HC RX 250: Performed by: INTERNAL MEDICINE

## 2021-11-18 PROCEDURE — 999N000127 HC STATISTIC PERIPHERAL IV START W US GUIDANCE

## 2021-11-18 PROCEDURE — 93325 DOPPLER ECHO COLOR FLOW MAPG: CPT | Mod: 26 | Performed by: INTERNAL MEDICINE

## 2021-11-18 RX ORDER — LIDOCAINE HYDROCHLORIDE 20 MG/ML
15 SOLUTION OROPHARYNGEAL ONCE
Status: COMPLETED | OUTPATIENT
Start: 2021-11-18 | End: 2021-11-18

## 2021-11-18 RX ORDER — SODIUM CHLORIDE 9 MG/ML
INJECTION, SOLUTION INTRAVENOUS CONTINUOUS PRN
Status: DISCONTINUED | OUTPATIENT
Start: 2021-11-18 | End: 2021-11-19 | Stop reason: HOSPADM

## 2021-11-18 RX ORDER — NALOXONE HYDROCHLORIDE 0.4 MG/ML
0.4 INJECTION, SOLUTION INTRAMUSCULAR; INTRAVENOUS; SUBCUTANEOUS
Status: DISCONTINUED | OUTPATIENT
Start: 2021-11-18 | End: 2021-11-19 | Stop reason: HOSPADM

## 2021-11-18 RX ORDER — NALOXONE HYDROCHLORIDE 0.4 MG/ML
0.2 INJECTION, SOLUTION INTRAMUSCULAR; INTRAVENOUS; SUBCUTANEOUS
Status: DISCONTINUED | OUTPATIENT
Start: 2021-11-18 | End: 2021-11-19 | Stop reason: HOSPADM

## 2021-11-18 RX ORDER — FENTANYL CITRATE 50 UG/ML
25 INJECTION, SOLUTION INTRAMUSCULAR; INTRAVENOUS
Status: DISCONTINUED | OUTPATIENT
Start: 2021-11-18 | End: 2021-11-19 | Stop reason: HOSPADM

## 2021-11-18 RX ORDER — IOPAMIDOL 755 MG/ML
150 INJECTION, SOLUTION INTRAVASCULAR ONCE
Status: COMPLETED | OUTPATIENT
Start: 2021-11-18 | End: 2021-11-18

## 2021-11-18 RX ORDER — FENTANYL CITRATE 50 UG/ML
50 INJECTION, SOLUTION INTRAMUSCULAR; INTRAVENOUS ONCE
Status: DISCONTINUED | OUTPATIENT
Start: 2021-11-18 | End: 2021-11-19 | Stop reason: HOSPADM

## 2021-11-18 RX ORDER — FLUMAZENIL 0.1 MG/ML
0.2 INJECTION, SOLUTION INTRAVENOUS
Status: DISCONTINUED | OUTPATIENT
Start: 2021-11-18 | End: 2021-11-19 | Stop reason: HOSPADM

## 2021-11-18 RX ORDER — LIDOCAINE 40 MG/G
CREAM TOPICAL
Status: DISCONTINUED | OUTPATIENT
Start: 2021-11-18 | End: 2021-11-19 | Stop reason: HOSPADM

## 2021-11-18 RX ORDER — ACYCLOVIR 200 MG/1
9.5 CAPSULE ORAL
Status: DISCONTINUED | OUTPATIENT
Start: 2021-11-18 | End: 2021-11-19 | Stop reason: HOSPADM

## 2021-11-18 RX ADMIN — MIDAZOLAM 2 MG: 1 INJECTION INTRAMUSCULAR; INTRAVENOUS at 13:47

## 2021-11-18 RX ADMIN — FENTANYL CITRATE 25 MCG: 50 INJECTION INTRAMUSCULAR; INTRAVENOUS at 13:50

## 2021-11-18 RX ADMIN — LIDOCAINE HYDROCHLORIDE 30 ML: 20 SOLUTION ORAL; TOPICAL at 13:35

## 2021-11-18 RX ADMIN — MIDAZOLAM 0.5 MG: 1 INJECTION INTRAMUSCULAR; INTRAVENOUS at 14:00

## 2021-11-18 RX ADMIN — MIDAZOLAM 0.5 MG: 1 INJECTION INTRAMUSCULAR; INTRAVENOUS at 13:49

## 2021-11-18 RX ADMIN — FENTANYL CITRATE 25 MCG: 50 INJECTION INTRAMUSCULAR; INTRAVENOUS at 14:01

## 2021-11-18 RX ADMIN — FENTANYL CITRATE 50 MCG: 50 INJECTION INTRAMUSCULAR; INTRAVENOUS at 13:47

## 2021-11-18 RX ADMIN — IOPAMIDOL 120 ML: 755 INJECTION, SOLUTION INTRAVENOUS at 14:42

## 2021-11-18 RX ADMIN — MIDAZOLAM 0.5 MG: 1 INJECTION INTRAMUSCULAR; INTRAVENOUS at 13:54

## 2021-11-18 RX ADMIN — TOPICAL ANESTHETIC 0.5 ML: 200 SPRAY DENTAL; PERIODONTAL at 13:40

## 2021-11-18 RX ADMIN — MIDAZOLAM 0.5 MG: 1 INJECTION INTRAMUSCULAR; INTRAVENOUS at 13:57

## 2021-11-18 NOTE — H&P (VIEW-ONLY)
Cardiology Pre-procedure H&P  November 18, 2021      HPI:   Augusto Meeks is a 60 year old male with a history significant for hypertension, obesity and persistent atrial fibrillation. He presents today for transesophageal echocardiogram.    At the time of interview, the patient denies chest pain, dyspnea at rest or with exertion, orthopnea, PND, palpitations, lightheadedness, or syncope.     Review of Systems:    Complete review of systems was performed and negative except per HPI.    PMH:  Past Medical History:   Diagnosis Date     A-fib (H) 2019     Antiplatelet or antithrombotic long-term use      Arrhythmia      Hypertension 1/20     Irregular heart beat      Sleep apnea      Active Problems:  Patient Active Problem List    Diagnosis Date Noted     Fatigue 01/20/2021     Priority: Medium     COVID-19 01/20/2021     Priority: Medium     Persistent atrial fibrillation (H) 10/20/2020     Priority: Medium     Added automatically from request for surgery [id number]       Benign essential hypertension 11/05/2019     Priority: Medium     New onset atrial fibrillation (H) 09/16/2019     Priority: Medium     Advanced directives, counseling/discussion 02/27/2018     Priority: Medium     Hyperlipidemia 01/18/2006     Priority: Medium     Tobacco use disorder 01/18/2006     Priority: Medium     Social History:  Social History     Tobacco Use     Smoking status: Former Smoker     Packs/day: 0.00     Years: 20.00     Pack years: 0.00     Types: Cigars     Start date: 12/12/1979     Quit date: 3/3/2011     Years since quitting: 10.7     Smokeless tobacco: Never Used     Tobacco comment: occasional cigar   Vaping Use     Vaping Use: Never used   Substance Use Topics     Alcohol use: Yes     Comment: occas      Drug use: Yes     Types: Marijuana     Comment: occas      Family History:  Family History   Problem Relation Age of Onset     Lung Cancer Mother      Cancer Father        Medications:    fentaNYL  50 mcg Intravenous  Once     sodium chloride (PF)  3 mL Intracatheter Q8H         sodium chloride         Physical Exam:  Pulse:  [] 82  Resp:  [14-18] 16  BP: (118-162)/() 118/80  SpO2:  [98 %-100 %] 99 %  No intake or output data in the 24 hours ending 11/18/21 1431  GEN: pleasant, no acute distress  HEENT: no icterus  CV: Irregularly irregular, normal s1/s2, no murmurs/rubs/s3/s4, no heave. JVP not elevated.   CHEST: clear to ausculation bilaterally, no rales or wheezing  ABD: soft, non-tender, normal active bowel sounds  EXTR: pulses 2+. No edema.   NEURO: alert oriented, speech fluent/appropriate, motor grossly nonfocal    Diagnostics:  All labs and imaging were reviewed, of note:    All laboratory data reviewed    Assessment and Plan:  Augusto Meeks is a 60 year old male with a history significant for hypertension, obesity and persistent atrial fibrillation. He presents today for transesophageal echocardiogram prior to PVI ablation with Dr. Clement.     No contra-indications to DOT, plan to proceed with procedure as scheduled.    I have discussed the above with Dr. Cleopatra Long who agrees with the above assessment and plan    Johnathan Rollins MD  Cardiology Fellow  Pager: [phone]

## 2021-11-18 NOTE — H&P
Cardiology Pre-procedure H&P  November 18, 2021      HPI:   Augusto Meeks is a 60 year old male with a history significant for hypertension, obesity and persistent atrial fibrillation. He presents today for transesophageal echocardiogram.    At the time of interview, the patient denies chest pain, dyspnea at rest or with exertion, orthopnea, PND, palpitations, lightheadedness, or syncope.     Review of Systems:    Complete review of systems was performed and negative except per HPI.    PMH:  Past Medical History:   Diagnosis Date     A-fib (H) 2019     Antiplatelet or antithrombotic long-term use      Arrhythmia      Hypertension 1/20     Irregular heart beat      Sleep apnea      Active Problems:  Patient Active Problem List    Diagnosis Date Noted     Fatigue 01/20/2021     Priority: Medium     COVID-19 01/20/2021     Priority: Medium     Persistent atrial fibrillation (H) 10/20/2020     Priority: Medium     Added automatically from request for surgery [id number]       Benign essential hypertension 11/05/2019     Priority: Medium     New onset atrial fibrillation (H) 09/16/2019     Priority: Medium     Advanced directives, counseling/discussion 02/27/2018     Priority: Medium     Hyperlipidemia 01/18/2006     Priority: Medium     Tobacco use disorder 01/18/2006     Priority: Medium     Social History:  Social History     Tobacco Use     Smoking status: Former Smoker     Packs/day: 0.00     Years: 20.00     Pack years: 0.00     Types: Cigars     Start date: 12/12/1979     Quit date: 3/3/2011     Years since quitting: 10.7     Smokeless tobacco: Never Used     Tobacco comment: occasional cigar   Vaping Use     Vaping Use: Never used   Substance Use Topics     Alcohol use: Yes     Comment: occas      Drug use: Yes     Types: Marijuana     Comment: occas      Family History:  Family History   Problem Relation Age of Onset     Lung Cancer Mother      Cancer Father        Medications:    fentaNYL  50 mcg Intravenous  Once     sodium chloride (PF)  3 mL Intracatheter Q8H         sodium chloride         Physical Exam:  Pulse:  [] 82  Resp:  [14-18] 16  BP: (118-162)/() 118/80  SpO2:  [98 %-100 %] 99 %  No intake or output data in the 24 hours ending 11/18/21 1431  GEN: pleasant, no acute distress  HEENT: no icterus  CV: Irregularly irregular, normal s1/s2, no murmurs/rubs/s3/s4, no heave. JVP not elevated.   CHEST: clear to ausculation bilaterally, no rales or wheezing  ABD: soft, non-tender, normal active bowel sounds  EXTR: pulses 2+. No edema.   NEURO: alert oriented, speech fluent/appropriate, motor grossly nonfocal    Diagnostics:  All labs and imaging were reviewed, of note:    All laboratory data reviewed    Assessment and Plan:  Augusto Meeks is a 60 year old male with a history significant for hypertension, obesity and persistent atrial fibrillation. He presents today for transesophageal echocardiogram prior to PVI ablation with Dr. Clement.     No contra-indications to DOT, plan to proceed with procedure as scheduled.    I have discussed the above with Dr. Cleopatra Long who agrees with the above assessment and plan    Johnathan Rollins MD  Cardiology Fellow  Pager: [phone]

## 2021-11-18 NOTE — PROGRESS NOTES
Pt arrived in ECHO department for scheduled DOT.   Procedure explained, questions answered and consent signed. Discharge instructions discussed with patient and given written copy.  Pt's throat sprayed at 1:30, therefore pt will not be able to eat or drink until 2 hours after at 3:30pm. Informed pt of this time and encouraged to start with warm fluids and soft foods.    Pt tolerated procedure well, and was given a total of 100 mcg IV fentanyl and 4 mg IV versed for conscious sedation.  Pt denied throat or chest pain after DOT complete.   DOT probe 62 used for procedure.  Pt denied chest or throat pain after procedure and was D/C home after awake and VSS. Escorted out to front lobby by staff to meet pt's ride home.

## 2021-11-18 NOTE — PRE-PROCEDURE
GENERAL PRE-PROCEDURE:   Procedure:  Transesophageal Echocardiogram  Date/Time:  11/18/2021 1:08 PM    Written consent obtained?: Yes    Risks and benefits: Risks, benefits and alternatives were discussed    Consent given by:  Patient  Patient states understanding of procedure being performed: Yes    Patient's understanding of procedure matches consent: Yes    Procedure consent matches procedure scheduled: Yes    Expected level of sedation:  Moderate  Appropriately NPO:  Yes  Mallampati  :  Grade 1- soft palate, uvula, tonsillar pillars, and posterior pharyngeal wall visible  Lungs:  Lungs clear with good breath sounds bilaterally  Heart:  Normal heart sounds and rate  History & Physical reviewed:  History and physical reviewed and no updates needed  Statement of review:  I have reviewed the lab findings, diagnostic data, medications, and the plan for sedation

## 2021-11-19 ENCOUNTER — HOSPITAL ENCOUNTER (OUTPATIENT)
Facility: CLINIC | Age: 61
Discharge: HOME | End: 2021-11-19
Attending: INTERNAL MEDICINE | Admitting: INTERNAL MEDICINE
Payer: COMMERCIAL

## 2021-11-19 ENCOUNTER — ANESTHESIA (OUTPATIENT)
Dept: SURGERY | Facility: CLINIC | Age: 61
End: 2021-11-19
Payer: COMMERCIAL

## 2021-11-19 VITALS
RESPIRATION RATE: 16 BRPM | BODY MASS INDEX: 33.53 KG/M2 | HEART RATE: 77 BPM | OXYGEN SATURATION: 100 % | DIASTOLIC BLOOD PRESSURE: 87 MMHG | HEIGHT: 72 IN | WEIGHT: 247.58 LBS | SYSTOLIC BLOOD PRESSURE: 142 MMHG | TEMPERATURE: 97.8 F

## 2021-11-19 DIAGNOSIS — I48.19 PERSISTENT ATRIAL FIBRILLATION (H): ICD-10-CM

## 2021-11-19 LAB
ABO/RH(D): NORMAL
ANION GAP SERPL CALCULATED.3IONS-SCNC: 3 MMOL/L (ref 3–14)
ANTIBODY SCREEN: NEGATIVE
BUN SERPL-MCNC: 8 MG/DL (ref 7–30)
CALCIUM SERPL-MCNC: 8.8 MG/DL (ref 8.5–10.1)
CHLORIDE BLD-SCNC: 107 MMOL/L (ref 94–109)
CO2 SERPL-SCNC: 28 MMOL/L (ref 20–32)
CREAT SERPL-MCNC: 0.86 MG/DL (ref 0.66–1.25)
ERYTHROCYTE [DISTWIDTH] IN BLOOD BY AUTOMATED COUNT: 13.3 % (ref 10–15)
GFR SERPL CREATININE-BSD FRML MDRD: >90 ML/MIN/1.73M2
GLUCOSE BLD-MCNC: 113 MG/DL (ref 70–99)
GLUCOSE BLDC GLUCOMTR-MCNC: 97 MG/DL (ref 70–99)
HCT VFR BLD AUTO: 40.3 % (ref 40–53)
HGB BLD-MCNC: 14.4 G/DL (ref 13.3–17.7)
INR PPP: 1.31 (ref 0.85–1.15)
MCH RBC QN AUTO: 34.2 PG (ref 26.5–33)
MCHC RBC AUTO-ENTMCNC: 35.7 G/DL (ref 31.5–36.5)
MCV RBC AUTO: 96 FL (ref 78–100)
PLATELET # BLD AUTO: 137 10E3/UL (ref 150–450)
POTASSIUM BLD-SCNC: 4.8 MMOL/L (ref 3.4–5.3)
RBC # BLD AUTO: 4.21 10E6/UL (ref 4.4–5.9)
SODIUM SERPL-SCNC: 138 MMOL/L (ref 133–144)
SPECIMEN EXPIRATION DATE: NORMAL
WBC # BLD AUTO: 6.1 10E3/UL (ref 4–11)

## 2021-11-19 PROCEDURE — 85027 COMPLETE CBC AUTOMATED: CPT | Performed by: INTERNAL MEDICINE

## 2021-11-19 PROCEDURE — 93005 ELECTROCARDIOGRAM TRACING: CPT

## 2021-11-19 PROCEDURE — 93010 ELECTROCARDIOGRAM REPORT: CPT | Performed by: INTERNAL MEDICINE

## 2021-11-19 PROCEDURE — 36415 COLL VENOUS BLD VENIPUNCTURE: CPT | Performed by: INTERNAL MEDICINE

## 2021-11-19 PROCEDURE — 85610 PROTHROMBIN TIME: CPT | Performed by: INTERNAL MEDICINE

## 2021-11-19 PROCEDURE — 86900 BLOOD TYPING SEROLOGIC ABO: CPT | Performed by: INTERNAL MEDICINE

## 2021-11-19 PROCEDURE — 80048 BASIC METABOLIC PNL TOTAL CA: CPT | Performed by: INTERNAL MEDICINE

## 2021-11-19 PROCEDURE — 82962 GLUCOSE BLOOD TEST: CPT

## 2021-11-19 RX ORDER — LABETALOL HYDROCHLORIDE 5 MG/ML
5 INJECTION, SOLUTION INTRAVENOUS
Status: CANCELLED | OUTPATIENT
Start: 2021-11-19

## 2021-11-19 RX ORDER — FENTANYL CITRATE 50 UG/ML
25 INJECTION, SOLUTION INTRAMUSCULAR; INTRAVENOUS
Status: CANCELLED | OUTPATIENT
Start: 2021-11-19

## 2021-11-19 RX ORDER — MEPERIDINE HYDROCHLORIDE 25 MG/ML
12.5 INJECTION INTRAMUSCULAR; INTRAVENOUS; SUBCUTANEOUS
Status: CANCELLED | OUTPATIENT
Start: 2021-11-19

## 2021-11-19 RX ORDER — SODIUM CHLORIDE, SODIUM LACTATE, POTASSIUM CHLORIDE, CALCIUM CHLORIDE 600; 310; 30; 20 MG/100ML; MG/100ML; MG/100ML; MG/100ML
INJECTION, SOLUTION INTRAVENOUS CONTINUOUS
Status: CANCELLED | OUTPATIENT
Start: 2021-11-19

## 2021-11-19 RX ORDER — HYDROMORPHONE HYDROCHLORIDE 1 MG/ML
0.2 INJECTION, SOLUTION INTRAMUSCULAR; INTRAVENOUS; SUBCUTANEOUS EVERY 5 MIN PRN
Status: CANCELLED | OUTPATIENT
Start: 2021-11-19

## 2021-11-19 RX ORDER — ONDANSETRON 2 MG/ML
4 INJECTION INTRAMUSCULAR; INTRAVENOUS EVERY 30 MIN PRN
Status: CANCELLED | OUTPATIENT
Start: 2021-11-19

## 2021-11-19 RX ORDER — ONDANSETRON 4 MG/1
4 TABLET, ORALLY DISINTEGRATING ORAL EVERY 30 MIN PRN
Status: CANCELLED | OUTPATIENT
Start: 2021-11-19

## 2021-11-19 RX ORDER — OXYCODONE HYDROCHLORIDE 5 MG/1
5 TABLET ORAL EVERY 4 HOURS PRN
Status: CANCELLED | OUTPATIENT
Start: 2021-11-19

## 2021-11-19 RX ORDER — FENTANYL CITRATE 50 UG/ML
50 INJECTION, SOLUTION INTRAMUSCULAR; INTRAVENOUS EVERY 5 MIN PRN
Status: CANCELLED | OUTPATIENT
Start: 2021-11-19

## 2021-11-19 RX ORDER — LIDOCAINE 40 MG/G
CREAM TOPICAL
Status: DISCONTINUED | OUTPATIENT
Start: 2021-11-19 | End: 2021-11-19 | Stop reason: HOSPADM

## 2021-11-19 RX ORDER — ALBUTEROL SULFATE 0.83 MG/ML
2.5 SOLUTION RESPIRATORY (INHALATION) EVERY 4 HOURS PRN
Status: CANCELLED | OUTPATIENT
Start: 2021-11-19

## 2021-11-19 ASSESSMENT — ENCOUNTER SYMPTOMS
DYSRHYTHMIAS: 1
ORTHOPNEA: 0

## 2021-11-19 ASSESSMENT — MIFFLIN-ST. JEOR: SCORE: 1976

## 2021-11-19 NOTE — ANESTHESIA PREPROCEDURE EVALUATION
Anesthesia Pre-Procedure Evaluation    Patient: Augusto Meeks   MRN: [id number] : 1960        Preoperative Diagnosis: Atrial fibrillation, unspecified type (H) [I48.91]   Procedure : Procedure(s):  ANESTHESIA, FOR CARDIOVERSION @1330     Past Medical History:   Diagnosis Date     A- (H)      Antiplatelet or antithrombotic long-term use      Arrhythmia      Hypertension      Irregular heart beat      Sleep apnea       Past Surgical History:   Procedure Laterality Date     ANESTHESIA CARDIOVERSION N/A 2019    Procedure: Anesthesia Coverage Transesophageal Echocardiogram, Cardioversion @0930;  Surgeon: GENERIC ANESTHESIA PROVIDER;  Location: [facility] OR     ANESTHESIA CARDIOVERSION N/A 10/30/2019    Procedure: ANESTHESIA, FOR CARDIOVERSION @1100;  Surgeon: GENERIC ANESTHESIA PROVIDER;  Location: [facility] OR     ANESTHESIA CARDIOVERSION N/A 2019    Procedure: CARDIOVERSION;  Surgeon: GENERIC ANESTHESIA PROVIDER;  Location: [facility] OR     ANESTHESIA CARDIOVERSION N/A 2021    Procedure: ANESTHESIA, FOR CARDIOVERSION @1330;  Surgeon: GENERIC ANESTHESIA PROVIDER;  Location:  OR     CARDIAC SURGERY  21    Ablaution     CARDIOVERSION  10/30/2019    Patient reported he had a cardioverson on 2019.     EP ABLATION FOCAL AFIB N/A 2021    Procedure: EP ABLATION FOCAL AFIB;  Surgeon: Vanessa Clement MD;  Location:  [facility]     VASECTOMY        Allergies   Allergen Reactions     Lisinopril Cough      Social History     Tobacco Use     Smoking status: Former Smoker     Packs/day: 0.00     Years: 20.00     Pack years: 0.00     Types: Cigars     Start date: 1979     Quit date: 3/3/2011     Years since quitting: 10.7     Smokeless tobacco: Never Used     Tobacco comment: occasional cigar   Substance Use Topics     Alcohol use: Yes     Comment: occas       Wt Readings from Last 1 Encounters:   21 112.3 kg (247 lb 9.2 oz)        Anesthesia Evaluation   Pt has had prior  anesthetic. Type: General and MAC.    No history of anesthetic complications       ROS/MED HX  ENT/Pulmonary:     (+) sleep apnea, doesn't use CPAP,     Neurologic:  - neg neurologic ROS     Cardiovascular:     (+) hypertension-----Taking blood thinners dysrhythmias, a-fib, Irregular Heartbeat/Palpitations,  (-) CHF, CADE, orthopnea/PND, syncope, pacemaker, pacemaker, ICD and murmur   METS/Exercise Tolerance: >4 METS    Hematologic:  - neg hematologic  ROS     Musculoskeletal:  - neg musculoskeletal ROS     GI/Hepatic:  - neg GI/hepatic ROS     Renal/Genitourinary:  - neg Renal ROS     Endo:  - neg endo ROS     Psychiatric/Substance Use:  - neg psychiatric ROS     Infectious Disease:  - neg infectious disease ROS     Malignancy:  - neg malignancy ROS     Other:            Physical Exam    Airway        Mallampati: III   TM distance: > 3 FB   Neck ROM: full   Mouth opening: > 3 cm    Respiratory Devices and Support         Dental  no notable dental history         Cardiovascular          Rhythm and rate: irregular and normal (-) no murmur    Pulmonary   pulmonary exam normal        breath sounds clear to auscultation           OUTSIDE LABS:  CBC:   Lab Results   Component Value Date    WBC 6.1 11/19/2021    WBC 5.3 01/22/2021    HGB 14.4 11/19/2021    HGB 12.9 (L) 01/22/2021    HCT 40.3 11/19/2021    HCT 37.3 (L) 01/22/2021     (L) 11/19/2021     (L) 01/22/2021     BMP:   Lab Results   Component Value Date     11/19/2021     01/22/2021    POTASSIUM 4.8 11/19/2021    POTASSIUM 4.3 05/21/2021    CHLORIDE 107 11/19/2021    CHLORIDE 108 01/22/2021    CO2 28 11/19/2021    CO2 25 01/22/2021    BUN 8 11/19/2021    BUN 13 01/22/2021    CR 0.86 11/19/2021    CR 0.82 01/22/2021     (H) 11/19/2021    GLC 97 11/19/2021     COAGS:   Lab Results   Component Value Date    INR 1.31 (H) 11/19/2021     POC:   Lab Results   Component Value Date     (H) 01/22/2021     HEPATIC:   Lab Results    Component Value Date    ALBUMIN 4.2 11/15/2019    PROTTOTAL 7.7 11/15/2019    ALT 48 11/15/2019    AST 20 11/15/2019    ALKPHOS 71 11/15/2019    BILITOTAL 0.7 11/15/2019     OTHER:   Lab Results   Component Value Date    A1C 4.8 11/15/2019    CHALINO 8.8 11/19/2021    MAG 2.2 05/21/2021    TSH 2.25 11/15/2019    T4 1.06 11/15/2019       Anesthesia Plan    ASA Status:  3   NPO Status:  NPO Appropriate    Anesthesia Type: General.     - Airway: ETT   Induction: Intravenous.   Maintenance: Inhalation.   Techniques and Equipment:     - Lines/Monitors: 2nd IV, Arterial Line     Consents    Anesthesia Plan(s) and associated risks, benefits, and realistic alternatives discussed. Questions answered and patient/representative(s) expressed understanding.    - Discussed:     - Discussed with:  Patient      - Extended Intubation/Ventilatory Support Discussed: No.      - Patient is DNR/DNI Status: No    Use of blood products discussed: No .     Postoperative Care    Pain management: IV analgesics.   PONV prophylaxis: Ondansetron (or other 5HT-3)     Comments:    Other Comments: Patient seen and examined.  Risks, benefits and alternatives to GETA discussed.  Questions answered and patient wishes to proceed                Braydon Guzman MD

## 2021-11-22 ENCOUNTER — LAB (OUTPATIENT)
Dept: LAB | Facility: CLINIC | Age: 61
End: 2021-11-22
Payer: COMMERCIAL

## 2021-11-22 DIAGNOSIS — Z11.59 ENCOUNTER FOR SCREENING FOR OTHER VIRAL DISEASES: ICD-10-CM

## 2021-11-22 DIAGNOSIS — I48.19 PERSISTENT ATRIAL FIBRILLATION (H): Primary | ICD-10-CM

## 2021-11-22 LAB
ATRIAL RATE - MUSE: 74 BPM
DIASTOLIC BLOOD PRESSURE - MUSE: NORMAL MMHG
INTERPRETATION ECG - MUSE: NORMAL
P AXIS - MUSE: NORMAL DEGREES
PR INTERVAL - MUSE: NORMAL MS
QRS DURATION - MUSE: 82 MS
QT - MUSE: 386 MS
QTC - MUSE: 419 MS
R AXIS - MUSE: 65 DEGREES
SARS-COV-2 RNA RESP QL NAA+PROBE: NEGATIVE
SYSTOLIC BLOOD PRESSURE - MUSE: NORMAL MMHG
T AXIS - MUSE: 26 DEGREES
VENTRICULAR RATE- MUSE: 71 BPM

## 2021-11-22 PROCEDURE — U0003 INFECTIOUS AGENT DETECTION BY NUCLEIC ACID (DNA OR RNA); SEVERE ACUTE RESPIRATORY SYNDROME CORONAVIRUS 2 (SARS-COV-2) (CORONAVIRUS DISEASE [COVID-19]), AMPLIFIED PROBE TECHNIQUE, MAKING USE OF HIGH THROUGHPUT TECHNOLOGIES AS DESCRIBED BY CMS-2020-01-R: HCPCS

## 2021-11-22 PROCEDURE — U0005 INFEC AGEN DETEC AMPLI PROBE: HCPCS

## 2021-11-22 RX ORDER — LIDOCAINE 40 MG/G
CREAM TOPICAL
Status: CANCELLED | OUTPATIENT
Start: 2021-11-22

## 2021-11-22 RX ORDER — SODIUM CHLORIDE 9 MG/ML
INJECTION, SOLUTION INTRAVENOUS CONTINUOUS
Status: CANCELLED | OUTPATIENT
Start: 2021-11-22

## 2021-11-24 ENCOUNTER — ANESTHESIA (OUTPATIENT)
Dept: CARDIOLOGY | Facility: CLINIC | Age: 61
End: 2021-11-24
Payer: COMMERCIAL

## 2021-11-24 ENCOUNTER — ANESTHESIA EVENT (OUTPATIENT)
Dept: CARDIOLOGY | Facility: CLINIC | Age: 61
End: 2021-11-24
Payer: COMMERCIAL

## 2021-11-24 ENCOUNTER — HOSPITAL ENCOUNTER (OUTPATIENT)
Facility: CLINIC | Age: 61
Discharge: HOME | End: 2021-11-24
Attending: INTERNAL MEDICINE | Admitting: INTERNAL MEDICINE
Payer: COMMERCIAL

## 2021-11-24 VITALS
RESPIRATION RATE: 19 BRPM | HEIGHT: 72 IN | HEART RATE: 79 BPM | OXYGEN SATURATION: 97 % | BODY MASS INDEX: 33.86 KG/M2 | DIASTOLIC BLOOD PRESSURE: 84 MMHG | WEIGHT: 250 LBS | SYSTOLIC BLOOD PRESSURE: 142 MMHG | TEMPERATURE: 97.4 F

## 2021-11-24 DIAGNOSIS — I48.19 PERSISTENT ATRIAL FIBRILLATION (H): ICD-10-CM

## 2021-11-24 LAB
ABO/RH(D): NORMAL
ACT BLD: 165 SECONDS (ref 74–150)
ACT BLD: 182 SECONDS (ref 74–150)
ACT BLD: 377 SECONDS (ref 74–150)
ACT BLD: 424 SECONDS (ref 74–150)
ACT BLD: 633 SECONDS (ref 74–150)
ANION GAP SERPL CALCULATED.3IONS-SCNC: 6 MMOL/L (ref 3–14)
ANTIBODY SCREEN: NEGATIVE
BUN SERPL-MCNC: 15 MG/DL (ref 7–30)
CALCIUM SERPL-MCNC: 8.8 MG/DL (ref 8.5–10.1)
CHLORIDE BLD-SCNC: 108 MMOL/L (ref 94–109)
CO2 SERPL-SCNC: 24 MMOL/L (ref 20–32)
CREAT SERPL-MCNC: 0.87 MG/DL (ref 0.66–1.25)
ERYTHROCYTE [DISTWIDTH] IN BLOOD BY AUTOMATED COUNT: 13.4 % (ref 10–15)
GFR SERPL CREATININE-BSD FRML MDRD: >90 ML/MIN/1.73M2
GLUCOSE BLD-MCNC: 126 MG/DL (ref 70–99)
GLUCOSE BLDC GLUCOMTR-MCNC: 117 MG/DL (ref 70–99)
HCT VFR BLD AUTO: 40.3 % (ref 40–53)
HGB BLD-MCNC: 14.4 G/DL (ref 13.3–17.7)
INR PPP: 1.26 (ref 0.85–1.15)
MCH RBC QN AUTO: 34.3 PG (ref 26.5–33)
MCHC RBC AUTO-ENTMCNC: 35.7 G/DL (ref 31.5–36.5)
MCV RBC AUTO: 96 FL (ref 78–100)
PLATELET # BLD AUTO: 138 10E3/UL (ref 150–450)
POTASSIUM BLD-SCNC: 4.4 MMOL/L (ref 3.4–5.3)
RBC # BLD AUTO: 4.2 10E6/UL (ref 4.4–5.9)
SODIUM SERPL-SCNC: 138 MMOL/L (ref 133–144)
SPECIMEN EXPIRATION DATE: NORMAL
WBC # BLD AUTO: 6.5 10E3/UL (ref 4–11)

## 2021-11-24 PROCEDURE — 86900 BLOOD TYPING SEROLOGIC ABO: CPT | Performed by: NURSE PRACTITIONER

## 2021-11-24 PROCEDURE — 258N000003 HC RX IP 258 OP 636: Performed by: NURSE ANESTHETIST, CERTIFIED REGISTERED

## 2021-11-24 PROCEDURE — 250N000011 HC RX IP 250 OP 636: Performed by: REGISTERED NURSE

## 2021-11-24 PROCEDURE — 85027 COMPLETE CBC AUTOMATED: CPT | Performed by: NURSE PRACTITIONER

## 2021-11-24 PROCEDURE — 250N000011 HC RX IP 250 OP 636: Performed by: NURSE ANESTHETIST, CERTIFIED REGISTERED

## 2021-11-24 PROCEDURE — C1730 CATH, EP, 19 OR FEW ELECT: HCPCS | Performed by: INTERNAL MEDICINE

## 2021-11-24 PROCEDURE — 999N000054 HC STATISTIC EKG NON-CHARGEABLE

## 2021-11-24 PROCEDURE — 80048 BASIC METABOLIC PNL TOTAL CA: CPT | Performed by: NURSE PRACTITIONER

## 2021-11-24 PROCEDURE — C1894 INTRO/SHEATH, NON-LASER: HCPCS | Performed by: INTERNAL MEDICINE

## 2021-11-24 PROCEDURE — 272N000001 HC OR GENERAL SUPPLY STERILE: Performed by: INTERNAL MEDICINE

## 2021-11-24 PROCEDURE — C1733 CATH, EP, OTHR THAN COOL-TIP: HCPCS | Performed by: INTERNAL MEDICINE

## 2021-11-24 PROCEDURE — 250N000009 HC RX 250: Performed by: NURSE ANESTHETIST, CERTIFIED REGISTERED

## 2021-11-24 PROCEDURE — 93010 ELECTROCARDIOGRAM REPORT: CPT | Performed by: INTERNAL MEDICINE

## 2021-11-24 PROCEDURE — 93657 TX L/R ATRIAL FIB ADDL: CPT | Performed by: INTERNAL MEDICINE

## 2021-11-24 PROCEDURE — 85610 PROTHROMBIN TIME: CPT | Performed by: NURSE PRACTITIONER

## 2021-11-24 PROCEDURE — 250N000011 HC RX IP 250 OP 636: Performed by: INTERNAL MEDICINE

## 2021-11-24 PROCEDURE — 99214 OFFICE O/P EST MOD 30 MIN: CPT | Mod: 25 | Performed by: INTERNAL MEDICINE

## 2021-11-24 PROCEDURE — 85347 COAGULATION TIME ACTIVATED: CPT

## 2021-11-24 PROCEDURE — 82962 GLUCOSE BLOOD TEST: CPT

## 2021-11-24 PROCEDURE — 93462 L HRT CATH TRNSPTL PUNCTURE: CPT | Performed by: INTERNAL MEDICINE

## 2021-11-24 PROCEDURE — 93613 INTRACARDIAC EPHYS 3D MAPG: CPT | Performed by: INTERNAL MEDICINE

## 2021-11-24 PROCEDURE — 36415 COLL VENOUS BLD VENIPUNCTURE: CPT | Performed by: NURSE PRACTITIONER

## 2021-11-24 PROCEDURE — 93656 COMPRE EP EVAL ABLTJ ATR FIB: CPT | Performed by: INTERNAL MEDICINE

## 2021-11-24 PROCEDURE — 250N000013 HC RX MED GY IP 250 OP 250 PS 637

## 2021-11-24 PROCEDURE — C1759 CATH, INTRA ECHOCARDIOGRAPHY: HCPCS | Performed by: INTERNAL MEDICINE

## 2021-11-24 PROCEDURE — 258N000003 HC RX IP 258 OP 636: Performed by: REGISTERED NURSE

## 2021-11-24 PROCEDURE — C1732 CATH, EP, DIAG/ABL, 3D/VECT: HCPCS | Performed by: INTERNAL MEDICINE

## 2021-11-24 PROCEDURE — 250N000009 HC RX 250: Performed by: REGISTERED NURSE

## 2021-11-24 PROCEDURE — 370N000017 HC ANESTHESIA TECHNICAL FEE, PER MIN: Performed by: INTERNAL MEDICINE

## 2021-11-24 PROCEDURE — 93662 INTRACARDIAC ECG (ICE): CPT | Performed by: INTERNAL MEDICINE

## 2021-11-24 RX ORDER — OXYCODONE HYDROCHLORIDE 5 MG/1
5 TABLET ORAL EVERY 4 HOURS PRN
Status: DISCONTINUED | OUTPATIENT
Start: 2021-11-24 | End: 2021-11-24 | Stop reason: HOSPADM

## 2021-11-24 RX ORDER — DEXAMETHASONE SODIUM PHOSPHATE 4 MG/ML
INJECTION, SOLUTION INTRA-ARTICULAR; INTRALESIONAL; INTRAMUSCULAR; INTRAVENOUS; SOFT TISSUE PRN
Status: DISCONTINUED | OUTPATIENT
Start: 2021-11-24 | End: 2021-11-24

## 2021-11-24 RX ORDER — ADENOSINE 3 MG/ML
INJECTION, SOLUTION INTRAVENOUS
Status: DISCONTINUED | OUTPATIENT
Start: 2021-11-24 | End: 2021-11-24 | Stop reason: HOSPADM

## 2021-11-24 RX ORDER — LIDOCAINE 40 MG/G
CREAM TOPICAL
Status: DISCONTINUED | OUTPATIENT
Start: 2021-11-24 | End: 2021-11-24 | Stop reason: HOSPADM

## 2021-11-24 RX ORDER — SODIUM CHLORIDE, SODIUM LACTATE, POTASSIUM CHLORIDE, CALCIUM CHLORIDE 600; 310; 30; 20 MG/100ML; MG/100ML; MG/100ML; MG/100ML
INJECTION, SOLUTION INTRAVENOUS CONTINUOUS
Status: DISCONTINUED | OUTPATIENT
Start: 2021-11-24 | End: 2021-11-24 | Stop reason: HOSPADM

## 2021-11-24 RX ORDER — MEPERIDINE HYDROCHLORIDE 25 MG/ML
12.5 INJECTION INTRAMUSCULAR; INTRAVENOUS; SUBCUTANEOUS
Status: DISCONTINUED | OUTPATIENT
Start: 2021-11-24 | End: 2021-11-24 | Stop reason: HOSPADM

## 2021-11-24 RX ORDER — NALOXONE HYDROCHLORIDE 0.4 MG/ML
0.4 INJECTION, SOLUTION INTRAMUSCULAR; INTRAVENOUS; SUBCUTANEOUS
Status: DISCONTINUED | OUTPATIENT
Start: 2021-11-24 | End: 2021-11-24 | Stop reason: HOSPADM

## 2021-11-24 RX ORDER — HYDROMORPHONE HYDROCHLORIDE 1 MG/ML
0.2 INJECTION, SOLUTION INTRAMUSCULAR; INTRAVENOUS; SUBCUTANEOUS EVERY 5 MIN PRN
Status: DISCONTINUED | OUTPATIENT
Start: 2021-11-24 | End: 2021-11-24 | Stop reason: HOSPADM

## 2021-11-24 RX ORDER — FENTANYL CITRATE 50 UG/ML
25 INJECTION, SOLUTION INTRAMUSCULAR; INTRAVENOUS
Status: DISCONTINUED | OUTPATIENT
Start: 2021-11-24 | End: 2021-11-24 | Stop reason: HOSPADM

## 2021-11-24 RX ORDER — HEPARIN SODIUM 1000 [USP'U]/ML
INJECTION, SOLUTION INTRAVENOUS; SUBCUTANEOUS PRN
Status: DISCONTINUED | OUTPATIENT
Start: 2021-11-24 | End: 2021-11-24

## 2021-11-24 RX ORDER — NALOXONE HYDROCHLORIDE 0.4 MG/ML
0.2 INJECTION, SOLUTION INTRAMUSCULAR; INTRAVENOUS; SUBCUTANEOUS
Status: DISCONTINUED | OUTPATIENT
Start: 2021-11-24 | End: 2021-11-24 | Stop reason: HOSPADM

## 2021-11-24 RX ORDER — FENTANYL CITRATE 50 UG/ML
25-50 INJECTION, SOLUTION INTRAMUSCULAR; INTRAVENOUS
Status: DISCONTINUED | OUTPATIENT
Start: 2021-11-24 | End: 2021-11-24 | Stop reason: HOSPADM

## 2021-11-24 RX ORDER — OXYCODONE AND ACETAMINOPHEN 5; 325 MG/1; MG/1
1 TABLET ORAL EVERY 4 HOURS PRN
Status: DISCONTINUED | OUTPATIENT
Start: 2021-11-24 | End: 2021-11-24 | Stop reason: HOSPADM

## 2021-11-24 RX ORDER — SODIUM CHLORIDE, SODIUM LACTATE, POTASSIUM CHLORIDE, CALCIUM CHLORIDE 600; 310; 30; 20 MG/100ML; MG/100ML; MG/100ML; MG/100ML
INJECTION, SOLUTION INTRAVENOUS CONTINUOUS PRN
Status: DISCONTINUED | OUTPATIENT
Start: 2021-11-24 | End: 2021-11-24

## 2021-11-24 RX ORDER — ONDANSETRON 4 MG/1
4 TABLET, ORALLY DISINTEGRATING ORAL EVERY 30 MIN PRN
Status: DISCONTINUED | OUTPATIENT
Start: 2021-11-24 | End: 2021-11-24 | Stop reason: HOSPADM

## 2021-11-24 RX ORDER — ONDANSETRON 2 MG/ML
INJECTION INTRAMUSCULAR; INTRAVENOUS PRN
Status: DISCONTINUED | OUTPATIENT
Start: 2021-11-24 | End: 2021-11-24

## 2021-11-24 RX ORDER — ATROPINE SULFATE 0.1 MG/ML
0.5 INJECTION INTRAVENOUS EVERY 5 MIN PRN
Status: DISCONTINUED | OUTPATIENT
Start: 2021-11-24 | End: 2021-11-24 | Stop reason: HOSPADM

## 2021-11-24 RX ORDER — FENTANYL CITRATE 50 UG/ML
INJECTION, SOLUTION INTRAMUSCULAR; INTRAVENOUS PRN
Status: DISCONTINUED | OUTPATIENT
Start: 2021-11-24 | End: 2021-11-24

## 2021-11-24 RX ORDER — ONDANSETRON 2 MG/ML
4 INJECTION INTRAMUSCULAR; INTRAVENOUS EVERY 30 MIN PRN
Status: DISCONTINUED | OUTPATIENT
Start: 2021-11-24 | End: 2021-11-24 | Stop reason: HOSPADM

## 2021-11-24 RX ORDER — SODIUM CHLORIDE 9 MG/ML
INJECTION, SOLUTION INTRAVENOUS CONTINUOUS
Status: DISCONTINUED | OUTPATIENT
Start: 2021-11-24 | End: 2021-11-24 | Stop reason: HOSPADM

## 2021-11-24 RX ORDER — LABETALOL HYDROCHLORIDE 5 MG/ML
5 INJECTION, SOLUTION INTRAVENOUS
Status: DISCONTINUED | OUTPATIENT
Start: 2021-11-24 | End: 2021-11-24 | Stop reason: HOSPADM

## 2021-11-24 RX ORDER — ALBUTEROL SULFATE 0.83 MG/ML
2.5 SOLUTION RESPIRATORY (INHALATION) EVERY 4 HOURS PRN
Status: DISCONTINUED | OUTPATIENT
Start: 2021-11-24 | End: 2021-11-24 | Stop reason: HOSPADM

## 2021-11-24 RX ORDER — ACETAMINOPHEN 325 MG/1
975 TABLET ORAL ONCE
Status: COMPLETED | OUTPATIENT
Start: 2021-11-24 | End: 2021-11-24

## 2021-11-24 RX ORDER — PROPOFOL 10 MG/ML
INJECTION, EMULSION INTRAVENOUS PRN
Status: DISCONTINUED | OUTPATIENT
Start: 2021-11-24 | End: 2021-11-24

## 2021-11-24 RX ORDER — PROTAMINE SULFATE 10 MG/ML
INJECTION, SOLUTION INTRAVENOUS PRN
Status: DISCONTINUED | OUTPATIENT
Start: 2021-11-24 | End: 2021-11-24

## 2021-11-24 RX ORDER — FENTANYL CITRATE 50 UG/ML
50 INJECTION, SOLUTION INTRAMUSCULAR; INTRAVENOUS EVERY 5 MIN PRN
Status: DISCONTINUED | OUTPATIENT
Start: 2021-11-24 | End: 2021-11-24 | Stop reason: HOSPADM

## 2021-11-24 RX ORDER — FLUMAZENIL 0.1 MG/ML
0.2 INJECTION, SOLUTION INTRAVENOUS
Status: DISCONTINUED | OUTPATIENT
Start: 2021-11-24 | End: 2021-11-24 | Stop reason: HOSPADM

## 2021-11-24 RX ADMIN — HEPARIN SODIUM 22000 UNITS: 1000 INJECTION INTRAVENOUS; SUBCUTANEOUS at 09:25

## 2021-11-24 RX ADMIN — MIDAZOLAM 1 MG: 1 INJECTION INTRAMUSCULAR; INTRAVENOUS at 08:50

## 2021-11-24 RX ADMIN — SUGAMMADEX 300 MG: 100 INJECTION, SOLUTION INTRAVENOUS at 11:48

## 2021-11-24 RX ADMIN — ONDANSETRON 4 MG: 2 INJECTION INTRAMUSCULAR; INTRAVENOUS at 09:30

## 2021-11-24 RX ADMIN — ROCURONIUM BROMIDE 20 MG: 50 INJECTION, SOLUTION INTRAVENOUS at 10:58

## 2021-11-24 RX ADMIN — ROCURONIUM BROMIDE 20 MG: 50 INJECTION, SOLUTION INTRAVENOUS at 09:47

## 2021-11-24 RX ADMIN — DEXAMETHASONE SODIUM PHOSPHATE 4 MG: 4 INJECTION, SOLUTION INTRA-ARTICULAR; INTRALESIONAL; INTRAMUSCULAR; INTRAVENOUS; SOFT TISSUE at 08:52

## 2021-11-24 RX ADMIN — PROTAMINE SULFATE 50 MG: 10 INJECTION, SOLUTION INTRAVENOUS at 11:32

## 2021-11-24 RX ADMIN — SODIUM CHLORIDE, POTASSIUM CHLORIDE, SODIUM LACTATE AND CALCIUM CHLORIDE: 600; 310; 30; 20 INJECTION, SOLUTION INTRAVENOUS at 08:47

## 2021-11-24 RX ADMIN — ACETAMINOPHEN 975 MG: 325 TABLET, FILM COATED ORAL at 12:58

## 2021-11-24 RX ADMIN — ROCURONIUM BROMIDE 70 MG: 50 INJECTION, SOLUTION INTRAVENOUS at 08:53

## 2021-11-24 RX ADMIN — PHENYLEPHRINE HYDROCHLORIDE 100 MCG: 10 INJECTION INTRAVENOUS at 08:51

## 2021-11-24 RX ADMIN — ROCURONIUM BROMIDE 20 MG: 50 INJECTION, SOLUTION INTRAVENOUS at 10:32

## 2021-11-24 RX ADMIN — FENTANYL CITRATE 50 MCG: 50 INJECTION, SOLUTION INTRAMUSCULAR; INTRAVENOUS at 10:39

## 2021-11-24 RX ADMIN — PROPOFOL 170 MG: 10 INJECTION, EMULSION INTRAVENOUS at 08:51

## 2021-11-24 RX ADMIN — FENTANYL CITRATE 100 MCG: 50 INJECTION, SOLUTION INTRAMUSCULAR; INTRAVENOUS at 08:50

## 2021-11-24 ASSESSMENT — MIFFLIN-ST. JEOR: SCORE: 1987

## 2021-11-24 NOTE — PROGRESS NOTES
EP ZULY came to visit pt to assess groin site. She called Dr. Polanco and got the OK that pt can discharge.

## 2021-11-24 NOTE — ANESTHESIA CARE TRANSFER NOTE
Patient: Augusto Meeks    Procedure: Procedure(s):  EP ABLATION FOCAL AFIB W/INTRA OP DOT       Diagnosis: atrial fibrillation  Diagnosis Additional Information: No value filed.    Anesthesia Type:   No value filed.     Note:    Oropharynx: oropharynx clear of all foreign objects  Level of Consciousness: awake  Oxygen Supplementation: nasal cannula  Level of Supplemental Oxygen (L/min / FiO2): 2  Independent Airway: airway patency satisfactory and stable  Dentition: dentition unchanged  Vital Signs Stable: post-procedure vital signs reviewed and stable  Report to RN Given: handoff report given  Patient transferred to: PACU    Handoff Report: Identifed the Patient, Identified the Reponsible Provider, Reviewed the pertinent medical history, Discussed the surgical course, Reviewed Intra-OP anesthesia mangement and issues during anesthesia, Set expectations for post-procedure period and Allowed opportunity for questions and acknowledgement of understanding      Vitals:  Vitals Value Taken Time   BP     Temp     Pulse 65 11/24/21 1206   Resp     SpO2 100 % 11/24/21 1206   Vitals shown include unvalidated device data.    Electronically Signed By: JELANI You CRNA  November 24, 2021  12:07 PM

## 2021-11-24 NOTE — Clinical Note
Arrhythmia Type: atrial fibrillation.   Method of Cardioversion: synchronous.   The arrhythmia was terminated.   Energy shock delivered: 200 joules.   Time shock delivered: 10:45 CST.   Post cardioversion rhythm: sinus rhythm.

## 2021-11-24 NOTE — ANESTHESIA PROCEDURE NOTES
Airway       Patient location during procedure: OR  Staff -        CRNA: Laurel Young APRN CRNA       Performed By: CRNA  Consent for Airway        Urgency: elective  Indications and Patient Condition       Indications for airway management: jose-procedural       Induction type:intravenous       Mask difficulty assessment: 2 - vent by mask + OA or adjuvant +/- NMBA    Final Airway Details       Final airway type: endotracheal airway       Successful airway: ETT - single  Endotracheal Airway Details        ETT size (mm): 8.0       Cuffed: yes       Successful intubation technique: direct laryngoscopy       DL Blade Type: MAC 4       Grade View of Cords: 1       Adjucts: stylet       Position: Right       Measured from: lips       Secured at (cm): 23       Bite block used: None    Post intubation assessment        Placement verified by: capnometry, equal breath sounds and chest rise        Number of attempts at approach: 1       Secured with: pink tape       Ease of procedure: easy       Dentition: Intact and Unchanged

## 2021-11-24 NOTE — H&P
Cardiac Electrophysiology H and P            HPI:   From Dr Clement note:  Augusto Meeks is a 60-year-old M with a medical history significant for hypertension, obesity and persistent atrial fibrillation.       He underwent circumferential pulmonary vein isolation for atrial fibrillation in January 2021, though experienced relief from his symptoms of fatigue and poor stamina for several days only before he reverted back to atrial fibrillation. He continues to have easy fatigability and poor stamina. He was seen in clinic on 5/3/21 with these symptoms, and was started on dronedarone with DCCV to sinus rhythm on 5/21/21.     He returns to clinic today for a 1 month follow-up after his recent DCCV. He remains on dronedarone and metoprolol as well as Xarelto for stroke risk reduction. He wears and Apple watch, and notes that he began to have notifications of atrial fibrillation 5 days following the cardioversion. He now gets notifications of atrial fibrillation every day that he wears the watch (4 episodes yesterday). He does not symptomatically notice when he goes in and out of atrial fibrillation, and wouldn't particularly know that he was in Afib were it not for the watch. He did not notice much difference in his energy level or exertional capacity on the days immediately following the cardioversion, and feels that his exercise capacity is about the same as it was a few months ago (faitgued after walking 1-2 flights of stairs or up a hill). He otherwise denies palpitations, lightheadedness, dizziness, or near syncope. He continues on anticoagulation following the cardioversion, and denies any significant bleeding issues. He has knew ankle swelling over the last month, but denies orthopnea, PND, or weight gain. The edema is worse in the evenings after work.    Here today for repeat afib ablation.     Past Medical History:      Past Medical History:   Diagnosis Date     A-fib (H) 2019     Antiplatelet or  antithrombotic long-term use      Arrhythmia      Hypertension 1/20     Irregular heart beat      Sleep apnea        Past Surgical  History:      Past Surgical History:   Procedure Laterality Date     ANESTHESIA CARDIOVERSION N/A 9/25/2019    Procedure: Anesthesia Coverage Transesophageal Echocardiogram, Cardioversion @0930;  Surgeon: GENERIC ANESTHESIA PROVIDER;  Location: [facility] OR     ANESTHESIA CARDIOVERSION N/A 10/30/2019    Procedure: ANESTHESIA, FOR CARDIOVERSION @1100;  Surgeon: GENERIC ANESTHESIA PROVIDER;  Location: [facility] OR     ANESTHESIA CARDIOVERSION N/A 12/5/2019    Procedure: CARDIOVERSION;  Surgeon: GENERIC ANESTHESIA PROVIDER;  Location: [facility] OR     ANESTHESIA CARDIOVERSION N/A 5/21/2021    Procedure: ANESTHESIA, FOR CARDIOVERSION @1330;  Surgeon: GENERIC ANESTHESIA PROVIDER;  Location:  OR     CARDIAC SURGERY  2/22/21    Ablaution     CARDIOVERSION  10/30/2019    Patient reported he had a cardioverson on 09/25/2019.     EP ABLATION FOCAL AFIB N/A 1/22/2021    Procedure: EP ABLATION FOCAL AFIB;  Surgeon: Vanessa Clement MD;  Location:  [facility]     VASECTOMY         Family History:      Family History   Problem Relation Age of Onset     Lung Cancer Mother      Cancer Father        Social History:      Social History     Socioeconomic History     Marital status:      Spouse name: Not on file     Number of children: Not on file     Years of education: Not on file     Highest education level: Not on file   Occupational History     Not on file   Tobacco Use     Smoking status: Former Smoker     Packs/day: 0.00     Years: 20.00     Pack years: 0.00     Types: Cigars     Start date: 12/12/1979     Quit date: 3/3/2011     Years since quitting: 10.7     Smokeless tobacco: Never Used     Tobacco comment: occasional cigar   Vaping Use     Vaping Use: Never used   Substance and Sexual Activity     Alcohol use: Yes     Comment: occas      Drug use: Yes     Types: Marijuana     Comment: occas       Sexual activity: Yes     Partners: Female     Birth control/protection: Male Surgical, Female Surgical   Other Topics Concern     Parent/sibling w/ CABG, MI or angioplasty before 65F 55M? No   Social History Narrative     Not on file     Social Determinants of Health     Financial Resource Strain: Not on file   Food Insecurity: Not on file   Transportation Needs: Not on file   Physical Activity: Not on file   Stress: Not on file   Social Connections: Not on file   Intimate Partner Violence: Not on file   Housing Stability: Not on file       ROS:    A complete review of systems is negative except as noted above in the HPI.    Physical Exam:   Vitals: BP (!) 142/97 (BP Location: Left arm)   Pulse 73   Temp 97.8  F (36.6  C) (Oral)   Resp 17   Ht 1.829 m (6')   Wt 113.4 kg (250 lb)   SpO2 100%   BMI 33.91 kg/m      GENERAL APPEARANCE: healthy, alert and no distress  HEENT: no icterus, no xanthelasmas, normal pupil size  NECK:  no bruits, JVP not elevated  RESPIRATORY: lungs clear to auscultation -  CARDIOVASCULAR: irregularly irregular rhythm, normal S1 with physiologic split S2, no S3 or S4 and no murmur, click or rub  EXTREMITIES: peripheral pulses normal,   NEURO: alert and oriented to person/place/time  SKIN: no ecchymoses, no rashes       Relevant labs, imaging, medications and procedures were reviewed.    Assessment and Recommendations:   Augusto Meeks is a 60-year-old M with a medical history significant for hypertension, obesity and persistent atrial fibrillation.       He underwent circumferential pulmonary vein isolation for atrial fibrillation in January 2021, though experienced relief from his symptoms of fatigue and poor stamina for several days only before he reverted back to atrial fibrillation. He continues to have easy fatigability and poor stamina. He was seen in clinic on 5/3/21 with these symptoms, and was started on dronedarone with DCCV to sinus rhythm on 5/21/21.     He returns to clinic  today for a 1 month follow-up after his recent DCCV. He remains on dronedarone and metoprolol as well as Xarelto for stroke risk reduction. He wears and Apple watch, and notes that he began to have notifications of atrial fibrillation 5 days following the cardioversion. He now gets notifications of atrial fibrillation every day that he wears the watch . He does not symptomatically notice when he goes in and out of atrial fibrillation, and wouldn't particularly know that he was in Afib were it not for the watch. He did not notice much difference in his energy level or exertional capacity on the days immediately following the cardioversion, and feels that his exercise capacity is about the same as it was a few months ago (faitgued after walking 1-2 flights of stairs or up a hill). He otherwise denies palpitations, lightheadedness, dizziness, or near syncope. He continues on anticoagulation following the cardioversion, and denies any significant bleeding issues. He has knew ankle swelling over the last month, but denies orthopnea, PND, or weight gain. The edema is worse in the evenings after work.    Decision was to repeat ablation    I discussed the patient with Dr. Polanco.    Agnes Hale MD   Cardiac electrophysiology fellow    Addendum:  I saw and evaluated the patient and agree with the fellow s finding and plans as written.  Prashanth Polanco MD

## 2021-11-24 NOTE — ANESTHESIA POSTPROCEDURE EVALUATION
Patient: Augusto Meeks    Procedure: Procedure(s):  EP ABLATION FOCAL AFIB W/INTRA OP DOT       Diagnosis:atrial fibrillation  Diagnosis Additional Information: No value filed.    Anesthesia Type:  No value filed.    Note:  Disposition: Outpatient   Postop Pain Control: Uneventful            Sign Out: Well controlled pain   PONV: No   Neuro/Psych: Uneventful            Sign Out: Acceptable/Baseline neuro status   Airway/Respiratory: Uneventful            Sign Out: Acceptable/Baseline resp. status   CV/Hemodynamics: Uneventful            Sign Out: Acceptable CV status; No obvious hypovolemia; No obvious fluid overload   Other NRE: NONE   DID A NON-ROUTINE EVENT OCCUR? No           Last vitals:  Vitals Value Taken Time   /74 11/24/21 1245   Temp 36.3  C (97.4  F) 11/24/21 1245   Pulse 67 11/24/21 1255   Resp 16 11/24/21 1245   SpO2 96 % 11/24/21 1257   Vitals shown include unvalidated device data.    Electronically Signed By: Francesco Noguera MD  November 24, 2021  1:09 PM

## 2021-11-24 NOTE — DISCHARGE INSTRUCTIONS
Post Ablation Instructions:    Care of groin site:    Remove Band-Aid after 24 hours. If there is minor oozing, apply another Band-Aid and remove it after 12 hours.    DO NOT take a bath, use a hot tub, pool, or submerge in water for at least 3 days.  You may shower.  It is normal to have a small bruise or lump at the site.    DO NOT scrub the site.    DO NOT use lotion or powder near the site for 3 days.    For the first 2 days: DO NOT stoop or squat; when you cough, sneeze, or move your bowels, hold your hand over the puncture site/s and press gently.     DO NOT lift more than 10 pounds or exertional activity for 10 days.    If you start bleeding from the site in your groin: Lie down flat and press firmly on the site.    Call you physician/doctor immediately, or come to the emergency room.    Call 911 right away if you have heavy bleeding or the bleeding does not stop.    Call your physician/doctor if:    You have a large or growing hard lump around/near the site.    The site is red, swollen, hot or tender.    Blood or fluid is draining from the site.    You have chills or a fever greater than 101 degrees F (38 degrees C)    Your leg or arm turns bluish, feels numb or cool/cold.    You have hives, a rash, or unusual itching.    Cardiovascular Clinic:  [address] [place] [id number]    Your Care Team:  [facility]   Telephone Number  Tania Sherman NP  [phone]          Iris Chavez RN  [phone]  Eliana Hoover RN     For scheduling appts  Or procedures:  Silvia Mcintyre   [phone]       As always, Thank you for trusting us with your health care needs.

## 2021-11-24 NOTE — ANESTHESIA PREPROCEDURE EVALUATION
Anesthesia Pre-Procedure Evaluation    Patient: Augusto Meeks   MRN: [id number] : 1960        Preoperative Diagnosis: atrial fibrillation    Procedure : Procedure(s):  EP ABLATION FOCAL AFIB W/INTRA OP DOT          Past Medical History:   Diagnosis Date      (2019     Antiplatelet or antithrombotic long-term use      Arrhythmia      Hypertension      Irregular heart beat      Sleep apnea       Past Surgical History:   Procedure Laterality Date     ANESTHESIA CARDIOVERSION N/A 2019    Procedure: Anesthesia Coverage Transesophageal Echocardiogram, Cardioversion @0930;  Surgeon: GENERIC ANESTHESIA PROVIDER;  Location: [facility] OR     ANESTHESIA CARDIOVERSION N/A 10/30/2019    Procedure: ANESTHESIA, FOR CARDIOVERSION @1100;  Surgeon: GENERIC ANESTHESIA PROVIDER;  Location: [facility] OR     ANESTHESIA CARDIOVERSION N/A 2019    Procedure: CARDIOVERSION;  Surgeon: GENERIC ANESTHESIA PROVIDER;  Location: [facility] OR     ANESTHESIA CARDIOVERSION N/A 2021    Procedure: ANESTHESIA, FOR CARDIOVERSION @1330;  Surgeon: GENERIC ANESTHESIA PROVIDER;  Location:  OR     CARDIAC SURGERY  21    Ablaution     CARDIOVERSION  10/30/2019    Patient reported he had a cardioverson on 2019.     EP ABLATION FOCAL AFIB N/A 2021    Procedure: EP ABLATION FOCAL AFIB;  Surgeon: Vanessa Clement MD;  Location:  [facility]     VASECTOMY        Allergies   Allergen Reactions     Lisinopril Cough      Social History     Tobacco Use     Smoking status: Former Smoker     Packs/day: 0.00     Years: 20.00     Pack years: 0.00     Types: Cigars     Start date: 1979     Quit date: 3/3/2011     Years since quitting: 10.7     Smokeless tobacco: Never Used     Tobacco comment: occasional cigar   Substance Use Topics     Alcohol use: Yes     Comment: occas       Wt Readings from Last 1 Encounters:   21 113.4 kg (250 lb)        Anesthesia Evaluation   Pt has had prior anesthetic.     No history of  anesthetic complications       ROS/MED HX  ENT/Pulmonary:     (+) sleep apnea,     Neurologic:       Cardiovascular:     (+) hypertension-----Taking blood thinners Irregular Heartbeat/Palpitations,     METS/Exercise Tolerance:     Hematologic:       Musculoskeletal:       GI/Hepatic:       Renal/Genitourinary:       Endo:       Psychiatric/Substance Use:       Infectious Disease:       Malignancy:       Other:            Physical Exam    Airway        Mallampati: I   TM distance: > 3 FB   Neck ROM: full   Mouth opening: > 3 cm    Respiratory Devices and Support         Dental  no notable dental history         Cardiovascular          Rhythm and rate: irregular     Pulmonary   pulmonary exam normal                OUTSIDE LABS:  CBC:   Lab Results   Component Value Date    WBC 6.5 11/24/2021    WBC 6.1 11/19/2021    HGB 14.4 11/24/2021    HGB 14.4 11/19/2021    HCT 40.3 11/24/2021    HCT 40.3 11/19/2021     (L) 11/24/2021     (L) 11/19/2021     BMP:   Lab Results   Component Value Date     11/24/2021     11/19/2021    POTASSIUM 4.4 11/24/2021    POTASSIUM 4.8 11/19/2021    CHLORIDE 108 11/24/2021    CHLORIDE 107 11/19/2021    CO2 24 11/24/2021    CO2 28 11/19/2021    BUN 15 11/24/2021    BUN 8 11/19/2021    CR 0.87 11/24/2021    CR 0.86 11/19/2021     (H) 11/24/2021     (H) 11/24/2021     COAGS:   Lab Results   Component Value Date    INR 1.26 (H) 11/24/2021     POC:   Lab Results   Component Value Date     (H) 01/22/2021     HEPATIC:   Lab Results   Component Value Date    ALBUMIN 4.2 11/15/2019    PROTTOTAL 7.7 11/15/2019    ALT 48 11/15/2019    AST 20 11/15/2019    ALKPHOS 71 11/15/2019    BILITOTAL 0.7 11/15/2019     OTHER:   Lab Results   Component Value Date    A1C 4.8 11/15/2019    CHALINO 8.8 11/24/2021    MAG 2.2 05/21/2021    TSH 2.25 11/15/2019    T4 1.06 11/15/2019       Anesthesia Plan    ASA Status:  2   NPO Status:  NPO Appropriate    Anesthesia Type:  General.     - Airway: ETT      Maintenance: Inhalation.   Techniques and Equipment:     - Lines/Monitors: 2nd IV     Consents    Anesthesia Plan(s) and associated risks, benefits, and realistic alternatives discussed. Questions answered and patient/representative(s) expressed understanding.    - Discussed:     - Discussed with:  Patient      - Extended Intubation/Ventilatory Support Discussed: No.      - Patient is DNR/DNI Status: No    Use of blood products discussed: Yes.     - Discussed with: Patient.     - Consented: consented to blood products            Reason for refusal: other.     Postoperative Care    Pain management: IV analgesics, Oral pain medications.   PONV prophylaxis: Ondansetron (or other 5HT-3), Dexamethasone or Solumedrol     Comments:                Shayne Loomis MD

## 2021-11-24 NOTE — PROGRESS NOTES
D/I/A: Pt roomed on 3C in [address].  Arrived via litter and accompanied by liter On monitor.  VSSA.  Rhythm upon arrival NRS on monitor.  Denies pain or sob.  Reviewed activity restrictions and when to notify RN, ie-changes to breathing or increased chest pressure or chest pain.  CCL access:  Right groin.   P: Continue to monitor status.  Discharge to home once meeting criteria.

## 2021-11-26 LAB
ATRIAL RATE - MUSE: 93 BPM
DIASTOLIC BLOOD PRESSURE - MUSE: NORMAL MMHG
INTERPRETATION ECG - MUSE: NORMAL
P AXIS - MUSE: NORMAL DEGREES
PR INTERVAL - MUSE: NORMAL MS
QRS DURATION - MUSE: 82 MS
QT - MUSE: 382 MS
QTC - MUSE: 451 MS
R AXIS - MUSE: 60 DEGREES
SYSTOLIC BLOOD PRESSURE - MUSE: NORMAL MMHG
T AXIS - MUSE: 25 DEGREES
VENTRICULAR RATE- MUSE: 84 BPM

## 2021-11-29 DIAGNOSIS — I48.19 PERSISTENT ATRIAL FIBRILLATION (H): ICD-10-CM

## 2021-11-30 ENCOUNTER — MYC MEDICAL ADVICE (OUTPATIENT)
Dept: CARDIOLOGY | Facility: CLINIC | Age: 61
End: 2021-11-30
Payer: COMMERCIAL

## 2021-11-30 DIAGNOSIS — I10 BENIGN ESSENTIAL HYPERTENSION: ICD-10-CM

## 2021-11-30 RX ORDER — DRONEDARONE 400 MG/1
TABLET, FILM COATED ORAL
Qty: 180 TABLET | Refills: 1 | Status: SHIPPED | OUTPATIENT
Start: 2021-11-30 | End: 2022-03-07

## 2021-11-30 NOTE — TELEPHONE ENCOUNTER
Message left for patient to call clinic or mychart with an update on how he is feeling s/p afib ablation.    Beronica Montoya RN  Cardiology Care Coordinator  [facility]  [phone] option 1

## 2021-11-30 NOTE — CONFIDENTIAL NOTE
Signed Prescriptions:                        Disp   Refills    MULTAQ 400 MG TABS tablet                  180 ta*1        Sig: TAKE 1 TABLET (400 MG) BY MOUTH 2 TIMES DAILY (WITH           MEALS)  Authorizing Provider: GIORGI QUILES  Ordering User: BERONICA MONTOYA  Rx filled per refill protocol.    Beronica Montoya RN  Cardiology Care Coordinator  [facility]  [phone] option 1

## 2021-12-01 NOTE — CONFIDENTIAL NOTE
"Spoke to patient.  Reports that he did have a difficult night the night after the ablation.  Felt short of breath.  It was Thanksgiving and he felt he did too much.  The shortness of breath was associated with \"a pound in my heard\" but by Friday at noon he felt fine.  Has an apple watch and it did notify him that he was in afib for a short time on Friday but otherwise he as been good.      Groin insertion site is \"black and blue\".  Denies pain, swelling, drainage, bleeding, fever or chills.      Lifting restrictions reviewed with the patient.  Patient verbalized understanding.    zio and follow-up scheduled 3 months out.    Beronica Montoya RN  Cardiology Care Coordinator  [facility]  [phone] option 1      "

## 2021-12-15 RX ORDER — METOPROLOL SUCCINATE 50 MG/1
50 TABLET, EXTENDED RELEASE ORAL DAILY
Qty: 90 TABLET | Refills: 3 | Status: SHIPPED | OUTPATIENT
Start: 2021-12-15 | End: 2022-03-07

## 2022-01-11 DIAGNOSIS — I48.19 PERSISTENT ATRIAL FIBRILLATION (H): ICD-10-CM

## 2022-01-13 RX ORDER — ATENOLOL 100 MG/1
TABLET ORAL
Qty: 180 CAPSULE | Refills: 1 | Status: SHIPPED | OUTPATIENT
Start: 2022-01-13 | End: 2022-03-07

## 2022-01-13 NOTE — TELEPHONE ENCOUNTER
Signed Prescriptions:                        Disp   Refills    PRADAXA ANTICOAGULANT 150 MG capsule       180 ca*1        Sig: TAKE 1 CAPSULE BY MOUTH 2 TIMES DAILY STORE IN           ORIGINAL 'S BOTTLE OR BLISTER PACK           USE WITHIN 120 DAYS OF OPENING.  Authorizing Provider: GIORGI QUILES  Ordering User: BERONICA MONTOYA    rx filled per refill protocol.    Beronica Montoya RN

## 2022-02-01 ENCOUNTER — ANCILLARY PROCEDURE (OUTPATIENT)
Dept: CARDIOLOGY | Facility: CLINIC | Age: 62
End: 2022-02-01
Attending: INTERNAL MEDICINE
Payer: COMMERCIAL

## 2022-02-01 ENCOUNTER — MYC MEDICAL ADVICE (OUTPATIENT)
Dept: FAMILY MEDICINE | Facility: CLINIC | Age: 62
End: 2022-02-01

## 2022-02-01 DIAGNOSIS — N52.9 ERECTILE DYSFUNCTION, UNSPECIFIED ERECTILE DYSFUNCTION TYPE: ICD-10-CM

## 2022-02-01 DIAGNOSIS — I48.19 PERSISTENT ATRIAL FIBRILLATION (H): ICD-10-CM

## 2022-02-01 RX ORDER — SILDENAFIL CITRATE 20 MG/1
TABLET ORAL
Qty: 20 TABLET | Refills: 1 | Status: SHIPPED | OUTPATIENT
Start: 2022-02-01 | End: 2023-02-22

## 2022-02-01 NOTE — PATIENT INSTRUCTIONS
We have applied a heart monitor (ZIO Patch) for you to wear for 14 days.  You may remove the heart monitor on 02/15/22 at 900am.  Please see the instruction booklet for further information, as well as instructions for removal of the heart monitor and return mailing directions.  If you should have questions regarding your monitor, please call [employer] at [phone].  We will contact you with your results (please see result notification details at bottom of summary).    *PLEASE DO NOT SHOWER OR INDUCE EXCESSIVE SWEATING IN THE FIRST 24 HOURS OF WEARING*

## 2022-02-01 NOTE — DISCHARGE INSTRUCTIONS
We have applied a heart monitor (ZIO Patch) for you to wear for 14 days.  You may remove the heart monitor on 02/15/22 at 900am.  Please see the instruction booklet for further information, as well as instructions for removal of the heart monitor and return mailing directions.  If you should have questions regarding your monitor, please call [employer] at [phone].  We will contact you with your results (please see result notification details at bottom of summary).    *PLEASE DO NOT SHOWER OR INDUCE EXCESSIVE SWEATING IN THE FIRST 24 HOURS OF WEARING*

## 2022-02-01 NOTE — PROGRESS NOTES
Patient brought zio Heart  Monitor from home to be applied today at clinic. This writer applied zio heart monitor .Kacie Nieves L.P.N.,[facility].

## 2022-03-06 NOTE — PROGRESS NOTES
"Electrophysiology Clinic Video Virtual Visit    Augusto Meeks is a 61 year old male who is being evaluated via a billable video visit.      The patient has been notified of following:     \"This video visit will be conducted via a call between you and your physician/provider. We have found that certain health care needs can be provided without the need for an in-person physical exam.  This service lets us provide the care you need with a video conversation.  If a prescription is necessary we can send it directly to your pharmacy.  If lab work is needed we can place an order for that and you can then stop by our lab to have the test done at a later time.    If during the course of the call the physician/provider feels a video visit is not appropriate, you will not be charged for this service.\"     Physician/provider has received verbal consent for a Video Visit from the patient? Yes    HPI:   Mr. Augusto Meeks is a 61-year-old gentleman with a medical history significant for hypertension, obesity and persistent atrial fibrillation. The patient does not have any history of diabetes, coronary artery disease, previous strokes, TIAs or heart failure.      The patient has undergone multiple cardioversions in the past (Sept 2019, October 2019, December 2019) . Immediately after the cardioversion, the patient feels symptomatically better. Each time he's only able to maintain sinus rhythm for a few days.  He was also started on Flecainide briefly last year, however, this was stopped in January 2020 due to persistence of Afib.     He presents today for follow up of his ablation done in 11/2021. Since his ablation he denies CP, SOB, CADE, orthopnea, or continuous palpitations. He does say that stairs are harder for him. He had a leadless EKG performed the beginning of February which demonstrated 100% burden of atrial fibrillation and 7 patient triggered events.    PAST MEDICAL HISTORY:  Past Medical History:   Diagnosis Date "     A-fib (H) 2019     Antiplatelet or antithrombotic long-term use      Arrhythmia      Hypertension 1/20     Irregular heart beat      Sleep apnea        CURRENT MEDICATIONS:  Current Outpatient Medications   Medication Sig Dispense Refill     amLODIPine (NORVASC) 5 MG tablet Take 1 tablet (5 mg) by mouth daily 90 tablet 3     fluticasone (FLONASE) 50 MCG/ACT nasal spray Spray 1 spray into both nostrils daily 16 g 3     loratadine-pseudoePHEDrine (CLARITIN-D 24-HOUR)  MG 24 hr tablet Take 1 tablet by mouth daily 30 tablet 0     metoprolol succinate ER (TOPROL-XL) 50 MG 24 hr tablet Take 1 tablet (50 mg) by mouth daily 90 tablet 3     MULTAQ 400 MG TABS tablet TAKE 1 TABLET (400 MG) BY MOUTH 2 TIMES DAILY (WITH MEALS) 180 tablet 1     PRADAXA ANTICOAGULANT 150 MG capsule TAKE 1 CAPSULE BY MOUTH 2 TIMES DAILY STORE IN ORIGINAL 'S BOTTLE OR BLISTER PACK USE WITHIN 120 DAYS OF OPENING. 180 capsule 1     sildenafil (REVATIO) 20 MG tablet Take 1-5 tablets one hour before intercourse. 20 tablet 1       PAST SURGICAL HISTORY:  Past Surgical History:   Procedure Laterality Date     ANESTHESIA CARDIOVERSION N/A 9/25/2019    Procedure: Anesthesia Coverage Transesophageal Echocardiogram, Cardioversion @0930;  Surgeon: GENERIC ANESTHESIA PROVIDER;  Location: [facility]     ANESTHESIA CARDIOVERSION N/A 10/30/2019    Procedure: ANESTHESIA, FOR CARDIOVERSION @1100;  Surgeon: GENERIC ANESTHESIA PROVIDER;  Location: [facility]     ANESTHESIA CARDIOVERSION N/A 12/5/2019    Procedure: CARDIOVERSION;  Surgeon: GENERIC ANESTHESIA PROVIDER;  [facility]     ANESTHESIA CARDIOVERSION N/A 5/21/2021    Procedure: ANESTHESIA, FOR CARDIOVERSION @1330;  Surgeon: GENERIC ANESTHESIA PROVIDER;  Location: [facility]     CARDIAC SURGERY  2/22/21    Ablaution     CARDIOVERSION  10/30/2019    Patient reported he had a cardioverson on 09/25/2019.     EP ABLATION FOCAL AFIB N/A 1/22/2021    Procedure: EP ABLATION FOCAL AFIB;  Surgeon: Vanessa Clement  MD Raquel;  Location:  HEART CARDIAC CATH LAB     EP ABLATION FOCAL AFIB N/A 2021    Procedure: EP ABLATION FOCAL AFIB W/INTRA OP DOT;  Surgeon: Prashanth Polanco MD;  Location:  HEART CARDIAC CATH LAB     VASECTOMY         ALLERGIES:     Allergies   Allergen Reactions     Lisinopril Cough       FAMILY HISTORY:  Family History   Problem Relation Age of Onset     Lung Cancer Mother      Cancer Father        SOCIAL HISTORY:  Social History     Tobacco Use     Smoking status: Former Smoker     Packs/day: 0.00     Years: 20.00     Pack years: 0.00     Types: Cigars     Start date: 1979     Quit date: 3/3/2011     Years since quittin.0     Smokeless tobacco: Never Used     Tobacco comment: occasional cigar   Vaping Use     Vaping Use: Never used   Substance Use Topics     Alcohol use: Yes     Comment: occas      Drug use: Yes     Types: Marijuana     Comment: occas        ROS:  10 point ROS neg other than the symptoms noted above in the HPI.    Exam:  CONSITUTIONAL: no acute distress  HEENT: no icterus, no redness or discharge, neck supple  CV: no visible edema of visualized extremities. No JVD.   RESPIRATORY: respirations nonlabored, no cough  NEURO: AA&Ox3, speech fluent/appropriate, motor grossly nonfocal  PSYCH: cooperative, affect appropriate  DERM: no rashes on visualized face/neck/upper extremities    Labs:  Reviewed.     Testing/Procedures:    ECHOCARDIOGRAM:   Interpretation Summary  The left atrial appendage is normal. It is free of spontaneous echo contrast  and thrombus. The left atrial appendage Doppler velocities are normal.     Left ventricular size, wall motion and function are normal. The ejection  fraction is 55-60%.  The right ventricle is normal in size and function.  No pericardial effusion is present.        Assessment and Plan:  61-year-old gentleman with a medical history significant for hypertension, obesity and persistent atrial fibrillation. He has no symptoms from his atrial  fibrillation and his most recent ziopatch shows that he is rate controlled (94 BPM average). He is active and has no problems caring for himself or golfing. We discussed extensively repeating an ablation or medication strategy to control rates. For now we will continue with rate control, if EF in the future becomes decreased, we can discuss rhythm control strategies at that time.    # Persistent atrial fibrillation s/p failed ablation 11/2021  # Hypertension      -Continue cardiac medications   -Increase metoprolol succinate 50mg daily to 75mg daily   -Stop Multaq 400mg qday  -No need for anticoagulation, [id number] 1  -3 day ziopatch to assess the ventricular rate and follow up via video visit in 1-2 months      All questions and concerns were addressed and patient is happy with the plan.  Case discussed with Dr Clement who's in agreement.      Video-Visit Details    Type of service:  Video Visit    Video Start: 1:28 PM  Video End: 1:39 PM  Originating Location (pt. Location): Work    Distant Location (provider location):  [facility] HE[facility]     Mode of Communication:  Video Conference [facility]    I have seen and interviewed patient. I have reviewed the laboratory tests, imaging, and other investigations. I have reviewed the management plan with the patient. I discussed with the team and agree with the findings and plan in this resident/fellow/nurse practitioner's note. In addition, assessment and plan have been incorporated into the note by myself, as to make it a single cohesive document.     Vanessa Clement MD, MS  Cardiology/EP Staff Attending

## 2022-03-07 ENCOUNTER — VIRTUAL VISIT (OUTPATIENT)
Dept: CARDIOLOGY | Facility: CLINIC | Age: 62
End: 2022-03-07
Payer: COMMERCIAL

## 2022-03-07 ENCOUNTER — TELEPHONE (OUTPATIENT)
Dept: CARDIOLOGY | Facility: CLINIC | Age: 62
End: 2022-03-07

## 2022-03-07 DIAGNOSIS — I10 BENIGN ESSENTIAL HYPERTENSION: ICD-10-CM

## 2022-03-07 DIAGNOSIS — I48.19 PERSISTENT ATRIAL FIBRILLATION (H): Primary | ICD-10-CM

## 2022-03-07 PROCEDURE — 99214 OFFICE O/P EST MOD 30 MIN: CPT | Mod: 95 | Performed by: INTERNAL MEDICINE

## 2022-03-07 RX ORDER — METOPROLOL SUCCINATE 50 MG/1
75 TABLET, EXTENDED RELEASE ORAL DAILY
Qty: 135 TABLET | Refills: 3 | Status: SHIPPED | OUTPATIENT
Start: 2022-03-07 | End: 2023-02-13

## 2022-03-07 NOTE — TELEPHONE ENCOUNTER
Left a message for patient to call back to let us know if he would like his Zio mailed to him or does he want to come into the clinic for it. 1-2 month virtual follow up needed as well with Dr. Clement.     MAYRA Cordero

## 2022-03-07 NOTE — PROGRESS NOTES
"Chandu is a 61 year old who is being evaluated via a billable video visit.      How would you like to obtain your AVS? MyChart  If the video visit is dropped, the invitation should be resent by: Text to cell phone: [phone]  Will anyone else be joining your video visit? No  {If patient encounters technical issues they should call [phone] :[id number]}    Video Start Time: {video visit start/end time for provider to select:152948}  Video-Visit Details    Type of service:  Video Visit    Video End Time:{video visit start/end time for provider to select:152948}    Originating Location (pt. Location): {video visit patient location:101717::\"Home\"}    Distant Location (provider location):  [facility] HEART [facility]     Platform used for Video Visit: {Virtual Visit Platforms:272290::\"CampandaWell\"}    "

## 2022-03-07 NOTE — LETTER
"3/7/2022      RE: Augusto Meeks  [address] [facility] [address]       Dear Colleague,    Thank you for the opportunity to participate in the care of your patient, Augusto Meeks, at the [facility] HEART CLINIC [facility]. Please see a copy of my visit note below.    Electrophysiology Clinic Video Virtual Visit    Augusto Meeks is a 61 year old male who is being evaluated via a billable video visit.      The patient has been notified of following:     \"This video visit will be conducted via a call between you and your physician/provider. We have found that certain health care needs can be provided without the need for an in-person physical exam.  This service lets us provide the care you need with a video conversation.  If a prescription is necessary we can send it directly to your pharmacy.  If lab work is needed we can place an order for that and you can then stop by our lab to have the test done at a later time.    If during the course of the call the physician/provider feels a video visit is not appropriate, you will not be charged for this service.\"     Physician/provider has received verbal consent for a Video Visit from the patient? Yes    HPI:   Mr. Augusto Meeks is a 61-year-old gentleman with a medical history significant for hypertension, obesity and persistent atrial fibrillation. The patient does not have any history of diabetes, coronary artery disease, previous strokes, TIAs or heart failure.      The patient has undergone multiple cardioversions in the past (Sept 2019, October 2019, December 2019) . Immediately after the cardioversion, the patient feels symptomatically better. Each time he's only able to maintain sinus rhythm for a few days.  He was also started on Flecainide briefly last year, however, this was stopped in January 2020 due to persistence of Afib.     He presents today for follow up of his ablation done in " 11/2021. Since his ablation he denies CP, SOB, CADE, orthopnea, or continuous palpitations. He does say that stairs are harder for him. He had a leadless EKG performed the beginning of February which demonstrated 100% burden of atrial fibrillation and 7 patient triggered events.    PAST MEDICAL HISTORY:  Past Medical History:   Diagnosis Date     A-fib (H) 2019     Antiplatelet or antithrombotic long-term use      Arrhythmia      Hypertension 1/20     Irregular heart beat      Sleep apnea        CURRENT MEDICATIONS:  Current Outpatient Medications   Medication Sig Dispense Refill     amLODIPine (NORVASC) 5 MG tablet Take 1 tablet (5 mg) by mouth daily 90 tablet 3     fluticasone (FLONASE) 50 MCG/ACT nasal spray Spray 1 spray into both nostrils daily 16 g 3     loratadine-pseudoePHEDrine (CLARITIN-D 24-HOUR)  MG 24 hr tablet Take 1 tablet by mouth daily 30 tablet 0     metoprolol succinate ER (TOPROL-XL) 50 MG 24 hr tablet Take 1 tablet (50 mg) by mouth daily 90 tablet 3     MULTAQ 400 MG TABS tablet TAKE 1 TABLET (400 MG) BY MOUTH 2 TIMES DAILY (WITH MEALS) 180 tablet 1     PRADAXA ANTICOAGULANT 150 MG capsule TAKE 1 CAPSULE BY MOUTH 2 TIMES DAILY STORE IN ORIGINAL 'S BOTTLE OR BLISTER PACK USE WITHIN 120 DAYS OF OPENING. 180 capsule 1     sildenafil (REVATIO) 20 MG tablet Take 1-5 tablets one hour before intercourse. 20 tablet 1       PAST SURGICAL HISTORY:  Past Surgical History:   Procedure Laterality Date     ANESTHESIA CARDIOVERSION N/A 9/25/2019    Procedure: Anesthesia Coverage Transesophageal Echocardiogram, Cardioversion @0930;  Surgeon: GENERIC ANESTHESIA PROVIDER;  Location: [facility]     ANESTHESIA CARDIOVERSION N/A 10/30/2019    Procedure: ANESTHESIA, FOR CARDIOVERSION @1100;  Surgeon: GENERIC ANESTHESIA PROVIDER;  Location: [facility]     ANESTHESIA CARDIOVERSION N/A 12/5/2019    Procedure: CARDIOVERSION;  Surgeon: GENERIC ANESTHESIA PROVIDER;  [facility]: [facility]     ANESTHESIA CARDIOVERSION N/A  2021    Procedure: ANESTHESIA, FOR CARDIOVERSION @1330;  Surgeon: GENERIC ANESTHESIA PROVIDER;  Location:  OR     CARDIAC SURGERY  21    Ablaution     CARDIOVERSION  10/30/2019    Patient reported he had a cardioverson on 2019.     EP ABLATION FOCAL AFIB N/A 2021    Procedure: EP ABLATION FOCAL AFIB;  Surgeon: Vanessa Clement MD;  Location:  HEART CARDIAC CATH LAB     EP ABLATION FOCAL AFIB N/A 2021    Procedure: EP ABLATION FOCAL AFIB W/INTRA OP ODT;  Surgeon: Prashanth Polanco MD;  Location:  HEART CARDIAC CATH LAB     VASECTOMY         ALLERGIES:     Allergies   Allergen Reactions     Lisinopril Cough       FAMILY HISTORY:  Family History   Problem Relation Age of Onset     Lung Cancer Mother      Cancer Father        SOCIAL HISTORY:  Social History     Tobacco Use     Smoking status: Former Smoker     Packs/day: 0.00     Years: 20.00     Pack years: 0.00     Types: Cigars     Start date: 1979     Quit date: 3/3/2011     Years since quittin.0     Smokeless tobacco: Never Used     Tobacco comment: occasional cigar   Vaping Use     Vaping Use: Never used   Substance Use Topics     Alcohol use: Yes     Comment: occas      Drug use: Yes     Types: Marijuana     Comment: occas        ROS:  10 point ROS neg other than the symptoms noted above in the HPI.    Exam:  CONSITUTIONAL: no acute distress  HEENT: no icterus, no redness or discharge, neck supple  CV: no visible edema of visualized extremities. No JVD.   RESPIRATORY: respirations nonlabored, no cough  NEURO: AA&Ox3, speech fluent/appropriate, motor grossly nonfocal  PSYCH: cooperative, affect appropriate  DERM: no rashes on visualized face/neck/upper extremities    Labs:  Reviewed.     Testing/Procedures:    ECHOCARDIOGRAM:   Interpretation Summary  The left atrial appendage is normal. It is free of spontaneous echo contrast  and thrombus. The left atrial appendage Doppler velocities are normal.     Left ventricular size,  wall motion and function are normal. The ejection  fraction is 55-60%.  The right ventricle is normal in size and function.  No pericardial effusion is present.        Assessment and Plan:  61-year-old gentleman with a medical history significant for hypertension, obesity and persistent atrial fibrillation. He has no symptoms from his atrial fibrillation and his most recent ziopatch shows that he is rate controlled (94 BPM average). He is active and has no problems caring for himself or golfing. We discussed extensively repeating an ablation or medication strategy to control rates. For now we will continue with rate control, if EF in the future becomes decreased, we can discuss rhythm control strategies at that time.    # Persistent atrial fibrillation s/p failed ablation 11/2021  # Hypertension      -Continue cardiac medications   -Increase metoprolol succinate 50mg daily to 75mg daily   -Stop Multaq 400mg qday  -No need for anticoagulation, [id number] 1  -3 day ziopatch to assess the ventricular rate and follow up via video visit in 1-2 months      All questions and concerns were addressed and patient is happy with the plan.  Case discussed with Dr Clement who's in agreement.      Video-Visit Details    Type of service:  Video Visit    Video Start: 1:28 PM  Video End: 1:39 PM  Originating Location (pt. Location): Work    Distant Location (provider location):  [facility] HE[facility]     Mode of Communication:  Video Conference via [employer]    I have seen and interviewed patient. I have reviewed the laboratory tests, imaging, and other investigations. I have reviewed the management plan with the patient. I discussed with the team and agree with the findings and plan in this resident/fellow/nurse practitioner's note. In addition, assessment and plan have been incorporated into the note by myself, as to make it a single cohesive document.           Please do not hesitate to contact me if you have any  questions/concerns.     Sincerely,     Vanessa Clement MD

## 2022-03-07 NOTE — PATIENT INSTRUCTIONS
Thank you for coming to the [facility] [place]; please note the following instructions:    1. Increase Toprol Xl to 75mg once daily  2. Zio Patch monitor for 3 days  3. Stop Moltec  4. Stop Blood Thinner  5. Follow up Video Visit with Dr. Clement in 1-2 months        If you have any questions regarding your visit please contact your care team:     Cardiology  Telephone Number   Beronica JORDAN., RN  Delisa TIRADO,RN  Lyric PARRISH, MAYRA SOLIS, MA  Kacie TRAN LPN   [phone]   (select option 1)    *After hours: [phone]     For scheduling appts:     [phone] or    [phone] (select option 1)    *After hours: [phone]     For the Device Clinic (Pacemakers and ICD's)  RN's :  Arleth Scanlon   During business hours: [phone]    *After business hours:  [phone] (select option 4)      Normal test result notifications will be released via [employer] or mailed within 7 business days.  All other test results, will be communicated via telephone once reviewed by your cardiologist.    If you need a medication refill please contact your pharmacy.  Please allow 3 business days for your refill to be completed.    As always, thank you for trusting us with your health care needs!

## 2022-03-22 NOTE — PROGRESS NOTES
SUBJECTIVE:   CC: Augusto Meeks is an 61 year old male who presents for preventative health visit.       Patient has been advised of split billing requirements and indicates understanding: Yes  Healthy Habits:     Getting at least 3 servings of Calcium per day:  NO    Bi-annual eye exam:  Yes    Dental care twice a year:  NO    Sleep apnea or symptoms of sleep apnea:  Sleep apnea    Diet:  Regular (no restrictions)    Frequency of exercise:  1 day/week    Duration of exercise:  15-30 minutes    Taking medications regularly:  No    Barriers to taking medications:  None    Medication side effects:  None    PHQ-2 Total Score: 0    Additional concerns today:  No           Today's PHQ-2 Score:   PHQ-2 (  [employer]) 3/23/2022   Q1: Little interest or pleasure in doing things 0   Q2: Feeling down, depressed or hopeless 0   PHQ-2 Score 0   PHQ-2 Total Score (12-17 Years)- Positive if 3 or more points; Administer PHQ-A if positive -   Q1: Little interest or pleasure in doing things Not at all   Q2: Feeling down, depressed or hopeless Not at all   PHQ-2 Score 0       Abuse: Current or Past(Physical, Sexual or Emotional)- No  Do you feel safe in your environment? Yes        Social History     Tobacco Use     Smoking status: Former Smoker     Packs/day: 0.00     Years: 20.00     Pack years: 0.00     Types: Cigars     Start date: 1979     Quit date: 3/3/2011     Years since quittin.0     Smokeless tobacco: Never Used     Tobacco comment: occasional cigar   Substance Use Topics     Alcohol use: Yes     Comment: occas           Alcohol Use 3/23/2022   Prescreen: >3 drinks/day or >7 drinks/week? Yes   Prescreen: >3 drinks/day or >7 drinks/week? -   AUDIT SCORE  5       Last PSA:   PSA   Date Value Ref Range Status   11/15/2019 2.07 0 - 4 ug/L Final     Comment:     Assay Method:  Chemiluminescence using Siemens Vista analyzer       Reviewed orders with patient. Reviewed health maintenance and updated orders  accordingly - Yes       Reviewed and updated as needed this visit by clinical staff   Tobacco  Allergies  Meds   Med Hx  Surg Hx  Fam Hx  Soc Hx        Reviewed and updated as needed this visit by Provider                     Review of Systems   Constitutional: Negative for chills and fever.   HENT: Negative for congestion, ear pain, hearing loss and sore throat.    Eyes: Negative for pain and visual disturbance.   Respiratory: Positive for shortness of breath. Negative for cough.    Cardiovascular: Negative for chest pain, palpitations and peripheral edema.   Gastrointestinal: Negative for abdominal pain, constipation, diarrhea, heartburn, hematochezia and nausea.   Genitourinary: Positive for impotence. Negative for dysuria, frequency, genital sores, hematuria, penile discharge and urgency.   Musculoskeletal: Negative for arthralgias, joint swelling and myalgias.   Skin: Negative for rash.   Neurological: Negative for dizziness, weakness, headaches and paresthesias.   Psychiatric/Behavioral: Negative for mood changes. The patient is not nervous/anxious.          OBJECTIVE:   /84   Pulse 90   Temp 97.5  F (36.4  C) (Tympanic)   Resp 16   Ht 1.829 m (6')   Wt 107.5 kg (237 lb)   SpO2 100%   BMI 32.14 kg/m      Physical Exam  GENERAL: healthy, alert and no distress  EYES: Eyes grossly normal to inspection, PERRL and conjunctivae and sclerae normal  HENT: ear canals and TM's normal, nose and mouth without ulcers or lesions  NECK: no adenopathy, no asymmetry, masses, or scars and thyroid normal to palpation  RESP: lungs clear to auscultation - no rales, rhonchi or wheezes  CV: regular rate and rhythm, normal S1 S2, no S3 or S4, no murmur, click or rub, no peripheral edema and peripheral pulses strong  ABDOMEN: soft, nontender, no hepatosplenomegaly, no masses and bowel sounds normal  MS: no gross musculoskeletal defects noted, no edema  SKIN: no suspicious lesions or rashes  NEURO: Normal strength  and tone, mentation intact and speech normal  PSYCH: mentation appears normal, affect normal/bright    Diagnostic Test Results:  Labs reviewed in Epic    ASSESSMENT/PLAN:       ICD-10-CM    1. Routine general medical examination at a health care facility  Z00.00    2. Screening for prostate cancer  Z12.5 PSA, screen       Patient has been advised of split billing requirements and indicates understanding: Yes    COUNSELING:   Reviewed preventive health counseling, as reflected in patient instructions            Estimated body mass index is 32.14 kg/m  as calculated from the following:    Height as of this encounter: 1.829 m (6').    Weight as of this encounter: 107.5 kg (237 lb).     Weight management plan: less calories    He reports that he quit smoking about 11 years ago. His smoking use included cigars. He started smoking about 42 years ago. He smoked 0.00 packs per day for 20.00 years. He has never used smokeless tobacco.      Counseling Resources:  ATP IV Guidelines  Pooled Cohorts Equation Calculator  FRAX Risk Assessment  ICSI Preventive Guidelines  Dietary Guidelines for Americans, 2010  USDA's MyPlate  ASA Prophylaxis  Lung CA Screening    Singh Valencia MD  [facility]

## 2022-03-23 ENCOUNTER — OFFICE VISIT (OUTPATIENT)
Dept: FAMILY MEDICINE | Facility: CLINIC | Age: 62
End: 2022-03-23
Payer: COMMERCIAL

## 2022-03-23 VITALS
WEIGHT: 237 LBS | RESPIRATION RATE: 16 BRPM | SYSTOLIC BLOOD PRESSURE: 139 MMHG | HEIGHT: 72 IN | TEMPERATURE: 97.5 F | HEART RATE: 90 BPM | DIASTOLIC BLOOD PRESSURE: 84 MMHG | BODY MASS INDEX: 32.1 KG/M2 | OXYGEN SATURATION: 100 %

## 2022-03-23 DIAGNOSIS — Z12.5 SCREENING FOR PROSTATE CANCER: ICD-10-CM

## 2022-03-23 DIAGNOSIS — Z00.00 ROUTINE GENERAL MEDICAL EXAMINATION AT A HEALTH CARE FACILITY: Primary | ICD-10-CM

## 2022-03-23 PROCEDURE — 99396 PREV VISIT EST AGE 40-64: CPT | Performed by: FAMILY MEDICINE

## 2022-03-23 ASSESSMENT — ENCOUNTER SYMPTOMS
FEVER: 0
COUGH: 0
DIARRHEA: 0
FREQUENCY: 0
EYE PAIN: 0
CHILLS: 0
SHORTNESS OF BREATH: 1
ABDOMINAL PAIN: 0
HEMATURIA: 0
DIZZINESS: 0
CONSTIPATION: 0
SORE THROAT: 0
NERVOUS/ANXIOUS: 0
WEAKNESS: 0
HEADACHES: 0
PALPITATIONS: 0
MYALGIAS: 0
HEARTBURN: 0
NAUSEA: 0
PARESTHESIAS: 0
HEMATOCHEZIA: 0
JOINT SWELLING: 0
DYSURIA: 0
ARTHRALGIAS: 0

## 2022-03-23 ASSESSMENT — PAIN SCALES - GENERAL: PAINLEVEL: NO PAIN (0)

## 2022-04-05 ENCOUNTER — E-VISIT (OUTPATIENT)
Dept: URGENT CARE | Facility: CLINIC | Age: 62
End: 2022-04-05
Payer: COMMERCIAL

## 2022-04-05 DIAGNOSIS — Z20.822 SUSPECTED COVID-19 VIRUS INFECTION: Primary | ICD-10-CM

## 2022-04-05 PROCEDURE — 99421 OL DIG E/M SVC 5-10 MIN: CPT | Performed by: PHYSICIAN ASSISTANT

## 2022-04-06 ENCOUNTER — LAB (OUTPATIENT)
Dept: URGENT CARE | Facility: URGENT CARE | Age: 62
End: 2022-04-06
Attending: PHYSICIAN ASSISTANT
Payer: COMMERCIAL

## 2022-04-06 DIAGNOSIS — Z20.822 SUSPECTED COVID-19 VIRUS INFECTION: ICD-10-CM

## 2022-04-06 LAB
FLUAV AG SPEC QL IA: NEGATIVE
FLUBV AG SPEC QL IA: NEGATIVE

## 2022-04-06 PROCEDURE — U0005 INFEC AGEN DETEC AMPLI PROBE: HCPCS

## 2022-04-06 PROCEDURE — 87804 INFLUENZA ASSAY W/OPTIC: CPT

## 2022-04-06 PROCEDURE — U0003 INFECTIOUS AGENT DETECTION BY NUCLEIC ACID (DNA OR RNA); SEVERE ACUTE RESPIRATORY SYNDROME CORONAVIRUS 2 (SARS-COV-2) (CORONAVIRUS DISEASE [COVID-19]), AMPLIFIED PROBE TECHNIQUE, MAKING USE OF HIGH THROUGHPUT TECHNOLOGIES AS DESCRIBED BY CMS-2020-01-R: HCPCS

## 2022-04-06 NOTE — PATIENT INSTRUCTIONS
Chandu,    Your symptoms show that you may have coronavirus (COVID-19). This illness can cause fever, cough and trouble breathing. Many people get a mild case and get better on their own. Some people can get very sick.    Because you reported additional symptoms, I would like to also test you for flu.    What should I do?  I have placed orders for these tests.   To schedule: go to your [employer] home page and scroll down to the section that says  You have an appointment that needs to be scheduled  and click the large green button that says  Schedule Now  and follow the steps to find the next available openings.     If you are unable to complete these [facility] scheduling steps, please call [phone] to schedule your testing.     These guidelines are for isolating before returning to work, school or .     For employers, schools and day cares: This is an official notice for this person s medical guidelines for returning in-person.     For health care sites: The CDC gives different isolation and quarantine guidelines for healthcare sites, please check with these sites before arriving.     How do I self-isolate?  You isolate when you have symptoms of COVID or a test shows you have COVID, even if you don t have symptoms.     If you DO have symptoms:  o Stay home and away from others  - For at least 5 days after your symptoms started, AND   - You are fever free for 24 hours (with no medicine that reduces fever), AND  - Your other symptoms are better.  o Wear a mask for 10 full days any time you are around others.    If you DON T have symptoms:  o Stay at home and away from others for at least 5 days after your positive test.  o Wear a mask for 10 full days any time you are around others.    How can I take care of myself?  Over the counter medications may help with your symptoms such as runny or stuffy nose, cough, chills, or fever. Talk to your care team about your options.     Some people are at high risk of severe  illness (for example, you have a weak immune system, you re 65 years or older, or you have certain medical problems). If your risk is high and your symptoms started in the last 5 to 7 days, we strongly recommend for you to get COVID treatment as soon as possible. Paxlovid, Molnupiravir and the monoclonal antibody treatments are proven safe and effective, make you feel better faster, and prevent hospitalization and death.       To schedule an appointment to discuss COVID treatment, request an appointment on [employer] (select  COVID-19 Treatment ) or call [employer] ([phone]), press 7.      Get lots of rest. Drink extra fluids (unless a doctor has told you not to)    Take Tylenol (acetaminophen) or ibuprofen for fever or pain. If you have liver or kidney problems, ask your family doctor if it's okay to take Tylenol or ibuprofen    Take over the counter medications for your symptoms, as directed by your doctor. You may also talk to your pharmacist.      If you have other health problems (like cancer, heart failure, an organ transplant or severe kidney disease): Call your specialty clinic if you don't feel better in the next 2 days.    Know when to call 911. Emergency warning signs include:  o Trouble breathing or shortness of breath  o Pain or pressure in the chest that doesn't go away  o Feeling confused like you haven't felt before, or not being able to wake up  o Bluish-colored lips or face    Where can I get more information?     [facility] - About COVID-19: [email or web address]     CDC - What to Do If You're Sick: https://www.cdc.gov/coronavirus/2019-ncov/if-you-are-sick/index.html     CDC - Quarantine & Isolation: https://www.cdc.gov/coronavirus/2019-ncov/your-health/quarantine-isolation.html     [facility] clinical trials (COVID-19 research studies): clinicalaffairs.[facility]/n-clinical-trials    Below are the COVID-19 hotlines at the [place] Department of Health ([facility]).  Interpreters are available.  o For health questions: Call 605-913-6986 or 1-830.837.1386 (7 a.m. to 7 p.m.)  o For questions about schools and childcare: Call 643-195-7105 or 1-875.342.4874 (7 a.m. to 7 p.m.)  April 5, 2022  RE:  Augusto Meeks                                                                                                                  [address] [facility] [address]      To whom it may concern:    I evaluated Augusto Meeks on April 5, 2022. Augusto Meeks should be excused from work/school.     They should let their workplace manager and staffing office know when their quarantine ends.    We can not give an exact date as it depends on the information below. They can calculate this on their own or work with their manager/staffing office to calculate this. (For example if they were exposed on 10/04, they would have to quarantine for 14 full days. That would be through 10/18. They could return on 10/19.)    Quarantine Guidelines:    If patient receives a positive COVID-19 test result, they should follow the guidance of those who are giving the results. Usually the return to work is 10 (or in some cases 20 days from symptom onset.) If they work at [employer], they must be cleared by Employee Occupational Health and Safety to return to work.      If patient receives a negative COVID-19 test result and did not have a high risk exposure to someone with a known positive COVID-19 test, they can return to work once they're free of fever for 24 hours without fever-reducing medication and their symptoms are improving or resolved.    If patient receives a negative COVID-19 test and if they had a high risk exposure to someone who has tested positive for COVID-19 then they can return to work 14 days after their last contact with the positive individual    Note: If there is ongoing exposure to the covid positive person, this quarantine period may be longer than 14 days. (For example, if they  are continually exposed to their child, who tested positive and cannot isolate from them, then the quarantine of 7-14 days can't start until their child is no longer contagious. This is typically 10 days from onset to the child's symptoms. So the total duration may be 17-24 days in this case.)     Sincerely,  Johnathan Woods PA-C          o

## 2022-04-07 LAB — SARS-COV-2 RNA RESP QL NAA+PROBE: NEGATIVE

## 2022-04-08 ENCOUNTER — ANCILLARY PROCEDURE (OUTPATIENT)
Dept: CARDIOLOGY | Facility: CLINIC | Age: 62
End: 2022-04-08
Attending: INTERNAL MEDICINE
Payer: COMMERCIAL

## 2022-04-08 DIAGNOSIS — I48.19 PERSISTENT ATRIAL FIBRILLATION (H): ICD-10-CM

## 2022-04-08 PROCEDURE — 93244 EXT ECG>48HR<7D REV&INTERPJ: CPT | Performed by: INTERNAL MEDICINE

## 2022-04-08 PROCEDURE — 93242 EXT ECG>48HR<7D RECORDING: CPT | Performed by: INTERNAL MEDICINE

## 2022-04-08 NOTE — PROGRESS NOTES
We have applied a heart monitor (ZIO Patch) for you to wear for 3 days.  You may remove the heart monitor on 4/11/2022 at 11 am.  Please see the instruction booklet for further information, as well as instructions for removal of the heart monitor and return mailing directions.  If you should have questions regarding your monitor, please call [employer] at [phone].  We will contact you with your results (please see result notification details at bottom of summary).    *PLEASE DO NOT SHOWER OR INDUCE EXCESSIVE SWEATING IN THE FIRST 24 HOURS OF WEARING*

## 2022-04-12 ENCOUNTER — OFFICE VISIT (OUTPATIENT)
Dept: URGENT CARE | Facility: URGENT CARE | Age: 62
End: 2022-04-12
Payer: COMMERCIAL

## 2022-04-12 VITALS
BODY MASS INDEX: 31.44 KG/M2 | HEART RATE: 86 BPM | TEMPERATURE: 98.8 F | WEIGHT: 231.8 LBS | OXYGEN SATURATION: 98 % | RESPIRATION RATE: 20 BRPM | DIASTOLIC BLOOD PRESSURE: 89 MMHG | SYSTOLIC BLOOD PRESSURE: 137 MMHG

## 2022-04-12 DIAGNOSIS — R05.9 COUGH: Primary | ICD-10-CM

## 2022-04-12 DIAGNOSIS — J18.9 PNEUMONIA OF LEFT LOWER LOBE DUE TO INFECTIOUS ORGANISM: ICD-10-CM

## 2022-04-12 PROCEDURE — 99214 OFFICE O/P EST MOD 30 MIN: CPT | Performed by: INTERNAL MEDICINE

## 2022-04-12 RX ORDER — AZITHROMYCIN 250 MG/1
TABLET, FILM COATED ORAL
Qty: 6 TABLET | Refills: 0 | Status: SHIPPED | OUTPATIENT
Start: 2022-04-12 | End: 2022-04-17

## 2022-04-12 NOTE — PROGRESS NOTES
SUBJECTIVE:  Augusto Meeks is an 61 year old male who presents for cough and shortness of breath.  Feels tired some.  Has had sxs for about a week and a half.  Had covid and flu tests last week which were negative.  Cough is sometimes productive.  Feels congested in chest.  No fevers chills, sweats.  No nasal congestion.  No n/v/d now but a little diarrhea initially.  No body aches.  Occasional headache from coughing.  No known exposures.  No current smoker, quit about 10 years ago.  Took some nyquil and tylenol which didn't help much.  Coughs more during day.  Sleeps well and feels pretty good when gets up but then gets fatigued quickly.  Has known hx of afib.  Current fatigue sxs are worse than what he gets typcially from afib.    PMH:   has a past medical history of A-fib (H) (), Antiplatelet or antithrombotic long-term use, Arrhythmia, Hypertension (), Irregular heart beat, and Sleep apnea.  Patient Active Problem List   Diagnosis     Advanced directives, counseling/discussion     Hyperlipidemia     New onset atrial fibrillation (H)     Tobacco use disorder     Benign essential hypertension     Persistent atrial fibrillation (H)     Fatigue     COVID-19     Social History     Socioeconomic History     Marital status:      Spouse name: None     Number of children: None     Years of education: None     Highest education level: None   Tobacco Use     Smoking status: Former Smoker     Packs/day: 0.00     Years: 20.00     Pack years: 0.00     Types: Cigars     Start date: 1979     Quit date: 3/3/2011     Years since quittin.1     Smokeless tobacco: Never Used     Tobacco comment: occasional cigar   Vaping Use     Vaping Use: Never used   Substance and Sexual Activity     Alcohol use: Yes     Comment: occas      Drug use: Yes     Types: Marijuana     Comment: occas      Sexual activity: Yes     Partners: Female     Birth control/protection: Male Surgical, Female Surgical   Other Topics  Concern     Parent/sibling w/ CABG, MI or angioplasty before 65F 55M? No     Family History   Problem Relation Age of Onset     Lung Cancer Mother      Cancer Father        ALLERGIES:  Lisinopril    Current Outpatient Medications   Medication     amLODIPine (NORVASC) 5 MG tablet     amoxicillin-clavulanate (AUGMENTIN) 875-125 MG tablet     azithromycin (ZITHROMAX) 250 MG tablet     metoprolol succinate ER (TOPROL-XL) 50 MG 24 hr tablet     sildenafil (REVATIO) 20 MG tablet     No current facility-administered medications for this visit.         ROS:  ROS is done and is negative for general/constitutional, eye, ENT, Respiratory, cardiovascular, GI, , Skin, musculoskeletal except as noted elsewhere.  All other review of systems negative except as noted elsewhere.    \  OBJECTIVE:  /89   Pulse 86   Temp 98.8  F (37.1  C) (Tympanic)   Resp 20   Wt 105.1 kg (231 lb 12.8 oz)   SpO2 98%   BMI 31.44 kg/m    GENERAL APPEARANCE: Alert, in no acute distress  EYES: normal  EARS: External ears normal. Canals clear. TM's normal.  NOSE:mildly inflamed mucosa  OROPHARYNX:normal  NECK:No adenopathy,masses or thyromegaly  RESP: good air movement, no wheezing, left lower fine crackles present.  CV:coccasional irregular beat  ABDOMEN: Abdomen soft, non-tender. BS normal. No masses, organomegaly  SKIN: no ulcers, lesions or rash  MUSCULOSKELETAL:Musculoskeletal normal      RESULTS  Xray chest - mild hazy infiltrate left lower lobe on my reading.    Recent Results (from the past 48 hour(s))   XR Chest 2 Views    Narrative    XR CHEST 2 VW 4/12/2022 1:00 PM    HISTORY: Cough    COMPARISON: Cardiac CTA 11/18/2021      Impression    IMPRESSION: Negative chest.    YONATHAN JAVIER MD         SYSTEM ID:  [id number]       ASSESSMENT/PLAN:    ASSESSMENT / PLAN:  (R05.9) Cough  (primary encounter diagnosis)  Comment: suspect pneumonia based on exam  Plan: XR Chest 2 Views, amoxicillin-clavulanate         (AUGMENTIN) 875-125 MG  tablet, azithromycin         (ZITHROMAX) 250 MG tablet        Reviewed medication instructions and side effects. Follow up if experiences side effects.. I reviewed supportive care, otc meds to use if needed, expected course, and signs of concern.  Follow up as needed or if he does not improve within 1 week(s) or if worsens in any way.  Reviewed red flag symptoms and is to go to the ER if experiences any of these.    (J18.9) Pneumonia of left lower lobe due to infectious organism  Comment: crackles on exam c/w pneumonia.  Plan: amoxicillin-clavulanate (AUGMENTIN) 875-125 MG         tablet, azithromycin (ZITHROMAX) 250 MG tablet        Reviewed medication instructions and side effects. Follow up if experiences side effects.. I reviewed supportive care, otc meds to use if needed, expected course, and signs of concern.  Follow up as needed or if he does not improve within 1 week(s) or if worsens in any way.  Reviewed red flag symptoms and is to go to the ER if experiences any of these.      PPE worn: mask and shield.    See [facility] for orders, medications, letters, patient instructions    Reema Bowden M.D.

## 2022-05-16 ENCOUNTER — VIRTUAL VISIT (OUTPATIENT)
Dept: CARDIOLOGY | Facility: CLINIC | Age: 62
End: 2022-05-16
Payer: COMMERCIAL

## 2022-05-16 DIAGNOSIS — I10 BENIGN ESSENTIAL HYPERTENSION: ICD-10-CM

## 2022-05-16 DIAGNOSIS — I48.21 PERMANENT ATRIAL FIBRILLATION (H): Primary | ICD-10-CM

## 2022-05-16 DIAGNOSIS — I48.19 PERSISTENT ATRIAL FIBRILLATION (H): ICD-10-CM

## 2022-05-16 PROCEDURE — 99213 OFFICE O/P EST LOW 20 MIN: CPT | Mod: 95 | Performed by: INTERNAL MEDICINE

## 2022-05-16 NOTE — LETTER
5/16/2022       RE: Augusto Meeks  [address] [facility] [address]       Dear Colleague,    Thank you for the opportunity to participate in the care of your patient, Augusto Meeks, at the [facility]. Please see a copy of my visit note below.      HPI: Purpose of visit: Follow-up for atrial fibrillation    Mr. Augusto Meeks is a 61-year-old gentleman with a medical history significant for hypertension, obesity and atrial fibrillation. The patient does not have any history of diabetes, coronary artery disease, previous strokes, TIAs or heart failure.      Patient's last visit with me was in March 2022.  During that visit, we increased patient's Toprol-XL to 75 mg once daily with the plan to recheck patient's average ventricular rate.  A Zio patch monitor was recently repeated which showed that the average ventricular rate is 93 bpm.    Patient remains in good health.  He did not report any symptoms of palpitations, irregular heartbeat sensation, exertional dyspnea, exertional angina, frequent lightheadedness, presyncope or syncope.  He has excellent exercise capacity.      PAST MEDICAL HISTORY:  Past Medical History:   Diagnosis Date     A-fib (H) 2019     Antiplatelet or antithrombotic long-term use      Arrhythmia      Hypertension 1/20     Irregular heart beat      Sleep apnea        CURRENT MEDICATIONS:  Current Outpatient Medications   Medication Sig Dispense Refill     amLODIPine (NORVASC) 5 MG tablet Take 1 tablet (5 mg) by mouth daily 90 tablet 3     metoprolol succinate ER (TOPROL-XL) 50 MG 24 hr tablet Take 1.5 tablets (75 mg) by mouth daily 135 tablet 3     sildenafil (REVATIO) 20 MG tablet Take 1-5 tablets one hour before intercourse. 20 tablet 1       PAST SURGICAL HISTORY:  Past Surgical History:   Procedure Laterality Date     ANESTHESIA CARDIOVERSION N/A 9/25/2019    Procedure: Anesthesia Coverage  Transesophageal Echocardiogram, Cardioversion @0930;  Surgeon: GENERIC ANESTHESIA PROVIDER;  Location: [facility] OR     ANESTHESIA CARDIOVERSION N/A 10/30/2019    Procedure: ANESTHESIA, FOR CARDIOVERSION @1100;  Surgeon: GENERIC ANESTHESIA PROVIDER;  Location: [facility] OR     ANESTHESIA CARDIOVERSION N/A 2019    Procedure: CARDIOVERSION;  Surgeon: GENERIC ANESTHESIA PROVIDER;  Location: [facility] OR     ANESTHESIA CARDIOVERSION N/A 2021    Procedure: ANESTHESIA, FOR CARDIOVERSION @1330;  Surgeon: GENERIC ANESTHESIA PROVIDER;  Location:  OR     CARDIAC SURGERY  21    Ablaution     CARDIOVERSION  10/30/2019    Patient reported he had a cardioverson on 2019.     EP ABLATION FOCAL AFIB N/A 2021    Procedure: EP ABLATION FOCAL AFIB;  Surgeon: Vanessa Clement MD;  Location:  HEART CARDIAC CATH LAB     EP ABLATION FOCAL AFIB N/A 2021    Procedure: EP ABLATION FOCAL AFIB W/INTRA OP DOT;  Surgeon: Prashanth Polanco MD;  Location:  HEART CARDIAC CATH LAB     VASECTOMY         ALLERGIES:     Allergies   Allergen Reactions     Lisinopril Cough       FAMILY HISTORY:  - Premature coronary artery disease  - Atrial fibrillation  - Sudden cardiac death     SOCIAL HISTORY:  Social History     Tobacco Use     Smoking status: Former Smoker     Packs/day: 0.00     Years: 20.00     Pack years: 0.00     Types: Cigars     Start date: 1979     Quit date: 3/3/2011     Years since quittin.2     Smokeless tobacco: Never Used     Tobacco comment: occasional cigar   Vaping Use     Vaping Use: Never used   Substance Use Topics     Alcohol use: Yes     Comment: occas      Drug use: Yes     Types: Marijuana     Comment: occas        ROS:   Constitutional: No fever, chills, or sweats. Weight stable.   ENT: No visual disturbance, ear ache, epistaxis, sore throat.   Cardiovascular: As per HPI.   Respiratory: No cough, hemoptysis.    GI: No nausea, vomiting, hematemesis, melena, or hematochezia.   : No hematuria.    Integument: Negative.   Psychiatric: Negative.   Hematologic:  Easy bruising, no easy bleeding.  Neuro: Negative.   Endocrinology: No significant heat or cold intolerance   Musculoskeletal: No myalgia.    Exam:  There were no vitals taken for this visit.  GENERAL APPEARANCE: healthy, alert and no distress    Labs:  CBC RESULTS:   Lab Results   Component Value Date    WBC 6.5 11/24/2021    WBC 5.3 01/22/2021    RBC 4.20 (L) 11/24/2021    RBC 3.88 (L) 01/22/2021    HGB 14.4 11/24/2021    HGB 12.9 (L) 01/22/2021    HCT 40.3 11/24/2021    HCT 37.3 (L) 01/22/2021    MCV 96 11/24/2021    MCV 96 01/22/2021    MCH 34.3 (H) 11/24/2021    MCH 33.2 (H) 01/22/2021    MCHC 35.7 11/24/2021    MCHC 34.6 01/22/2021    RDW 13.4 11/24/2021    RDW 14.9 01/22/2021     (L) 11/24/2021     (L) 01/22/2021       BMP RESULTS:  Lab Results   Component Value Date     11/24/2021     01/22/2021    POTASSIUM 4.4 11/24/2021    POTASSIUM 4.3 05/21/2021    CHLORIDE 108 11/24/2021    CHLORIDE 108 01/22/2021    CO2 24 11/24/2021    CO2 25 01/22/2021    ANIONGAP 6 11/24/2021    ANIONGAP 6 01/22/2021     (H) 11/24/2021     (H) 11/24/2021     (H) 01/22/2021    BUN 15 11/24/2021    BUN 13 01/22/2021    CR 0.87 11/24/2021    CR 0.82 01/22/2021    GFRESTIMATED >90 11/24/2021    GFRESTIMATED >90 01/22/2021    GFRESTBLACK >90 01/22/2021    CHALINO 8.8 11/24/2021    CHALINO 8.7 01/22/2021        INR RESULTS:  Lab Results   Component Value Date    INR 1.26 (H) 11/24/2021    INR 1.31 (H) 11/19/2021    INR 1.39 (H) 09/25/2019       Procedures:      Assessment and Plan:     Permanent atrial fibrillation-well controlled by Toprol-XL 75 mg once daily    It is encouraging the patient has excellent exercise capacity, remains asymptomatic, and the ventricular rate is well controlled by Toprol-XL 25 mg once daily.    I will see patient again by video clinic in approximately 6 months.  Prior to that visit, patient will undergo a  transthoracic echocardiogram to define the structure and function of the heart.  All questions and concerns were addressed and patient is happy with the plan.    Please do not hesitate to contact me if you have any questions/concerns.     Sincerely,     Vanessa Clement MD      CC  Patient Care Team:  Vanessa Clement MD as Assigned Heart and Vascular Provider  Lacy New MD as Assigned Neuroscience Provider  Hiram Covarrubias MD as Assigned Pulmonology Provider  Masoud Chavez MD as Assigned Surgical Provider  Singh Valencia MD as Assigned PCP

## 2022-05-16 NOTE — PROGRESS NOTES
Chandu is a 61 year old who is being evaluated via a billable video visit.      How would you like to obtain your AVS? MyChart  If the video visit is dropped, the invitation should be resent by: Text to cell phone: [phone]  Will anyone else be joining your video visit? No      Video Start Time: 4:25 PM  Video-Visit Details    Type of service:  Video Visit    Video End Time: 4:30 PM    Originating Location (pt. Location): Other Office    Distant Location (provider location):  [facility] HE[facility] [employer] [facility]     Platform used for Video Visit: SureFire     HPI: Purpose of visit: Follow-up for atrial fibrillation    Mr. Augusto Meeks is a 61-year-old gentleman with a medical history significant for hypertension, obesity and atrial fibrillation. The patient does not have any history of diabetes, coronary artery disease, previous strokes, TIAs or heart failure.      Patient's last visit with me was in March 2022.  During that visit, we increased patient's Toprol-XL to 75 mg once daily with the plan to recheck patient's average ventricular rate.  A Zio patch monitor was recently repeated which showed that the average ventricular rate is 93 bpm.    Patient remains in good health.  He did not report any symptoms of palpitations, irregular heartbeat sensation, exertional dyspnea, exertional angina, frequent lightheadedness, presyncope or syncope.  He has excellent exercise capacity.      PAST MEDICAL HISTORY:  Past Medical History:   Diagnosis Date     A-fib (H) 2019     Antiplatelet or antithrombotic long-term use      Arrhythmia      Hypertension 1/20     Irregular heart beat      Sleep apnea        CURRENT MEDICATIONS:  Current Outpatient Medications   Medication Sig Dispense Refill     amLODIPine (NORVASC) 5 MG tablet Take 1 tablet (5 mg) by mouth daily 90 tablet 3     metoprolol succinate ER (TOPROL-XL) 50 MG 24 hr tablet Take 1.5 tablets (75 mg) by mouth daily 135 tablet 3     sildenafil (REVATIO) 20 MG tablet  Take 1-5 tablets one hour before intercourse. 20 tablet 1       PAST SURGICAL HISTORY:  Past Surgical History:   Procedure Laterality Date     ANESTHESIA CARDIOVERSION N/A 2019    Procedure: Anesthesia Coverage Transesophageal Echocardiogram, Cardioversion @0930;  Surgeon: GENERIC ANESTHESIA PROVIDER;  [facility]     ANESTHESIA CARDIOVERSION N/A 10/30/2019    Procedure: ANESTHESIA, FOR CARDIOVERSION @1100;  Surgeon: GENERIC ANESTHESIA PROVIDER;  [facility]     ANESTHESIA CARDIOVERSION N/A 2019    Procedure: CARDIOVERSION;  Surgeon: GENERIC ANESTHESIA PROVIDER;  [facility]     ANESTHESIA CARDIOVERSION N/A 2021    Procedure: ANESTHESIA, FOR CARDIOVERSION @1330;  Surgeon: GENERIC ANESTHESIA PROVIDER;  Location:  OR     CARDIAC SURGERY  21    Ablaution     CARDIOVERSION  10/30/2019    Patient reported he had a cardioverson on 2019.     EP ABLATION FOCAL AFIB N/A 2021    Procedure: EP ABLATION FOCAL AFIB;  Surgeon: Vanessa Clement MD;  Location:  HEART CARDIAC CATH LAB     EP ABLATION FOCAL AFIB N/A 2021    Procedure: EP ABLATION FOCAL AFIB W/INTRA OP DOT;  Surgeon: Prashanth Polanco MD;  Location:  HEART CARDIAC CATH LAB     VASECTOMY         ALLERGIES:     Allergies   Allergen Reactions     Lisinopril Cough       FAMILY HISTORY:  - Premature coronary artery disease  - Atrial fibrillation  - Sudden cardiac death     SOCIAL HISTORY:  Social History     Tobacco Use     Smoking status: Former Smoker     Packs/day: 0.00     Years: 20.00     Pack years: 0.00     Types: Cigars     Start date: 1979     Quit date: 3/3/2011     Years since quittin.2     Smokeless tobacco: Never Used     Tobacco comment: occasional cigar   Vaping Use     Vaping Use: Never used   Substance Use Topics     Alcohol use: Yes     Comment: occas      Drug use: Yes     Types: Marijuana     Comment: occas        ROS:   Constitutional: No fever, chills, or sweats. Weight stable.   ENT: No  visual disturbance, ear ache, epistaxis, sore throat.   Cardiovascular: As per HPI.   Respiratory: No cough, hemoptysis.    GI: No nausea, vomiting, hematemesis, melena, or hematochezia.   : No hematuria.   Integument: Negative.   Psychiatric: Negative.   Hematologic:  Easy bruising, no easy bleeding.  Neuro: Negative.   Endocrinology: No significant heat or cold intolerance   Musculoskeletal: No myalgia.    Exam:  There were no vitals taken for this visit.  GENERAL APPEARANCE: healthy, alert and no distress    Labs:  CBC RESULTS:   Lab Results   Component Value Date    WBC 6.5 11/24/2021    WBC 5.3 01/22/2021    RBC 4.20 (L) 11/24/2021    RBC 3.88 (L) 01/22/2021    HGB 14.4 11/24/2021    HGB 12.9 (L) 01/22/2021    HCT 40.3 11/24/2021    HCT 37.3 (L) 01/22/2021    MCV 96 11/24/2021    MCV 96 01/22/2021    MCH 34.3 (H) 11/24/2021    MCH 33.2 (H) 01/22/2021    MCHC 35.7 11/24/2021    MCHC 34.6 01/22/2021    RDW 13.4 11/24/2021    RDW 14.9 01/22/2021     (L) 11/24/2021     (L) 01/22/2021       BMP RESULTS:  Lab Results   Component Value Date     11/24/2021     01/22/2021    POTASSIUM 4.4 11/24/2021    POTASSIUM 4.3 05/21/2021    CHLORIDE 108 11/24/2021    CHLORIDE 108 01/22/2021    CO2 24 11/24/2021    CO2 25 01/22/2021    ANIONGAP 6 11/24/2021    ANIONGAP 6 01/22/2021     (H) 11/24/2021     (H) 11/24/2021     (H) 01/22/2021    BUN 15 11/24/2021    BUN 13 01/22/2021    CR 0.87 11/24/2021    CR 0.82 01/22/2021    GFRESTIMATED >90 11/24/2021    GFRESTIMATED >90 01/22/2021    GFRESTBLACK >90 01/22/2021    CHALINO 8.8 11/24/2021    CHALINO 8.7 01/22/2021        INR RESULTS:  Lab Results   Component Value Date    INR 1.26 (H) 11/24/2021    INR 1.31 (H) 11/19/2021    INR 1.39 (H) 09/25/2019       Procedures:      Assessment and Plan:     Permanent atrial fibrillation-well controlled by Toprol-XL 75 mg once daily    It is encouraging the patient has excellent exercise capacity, remains  asymptomatic, and the ventricular rate is well controlled by Toprol-XL 25 mg once daily.    I will see patient again by video clinic in approximately 6 months.  Prior to that visit, patient will undergo a transthoracic echocardiogram to define the structure and function of the heart.  All questions and concerns were addressed and patient is happy with the plan.      CC  Patient Care Team:  Giorgi Quiles MD as Assigned Heart and Vascular Provider  Lacy New MD as Assigned Neuroscience Provider  Hiram Covarrubias MD as MD (Critical Care)  Giorgi Quiles MD as Referring Physician (Clinical Cardiac Electrophysiology)  Hiram Covarrubias MD as Assigned Pulmonology Provider  Masoud Chavez MD as Assigned Surgical Provider  Singh Valencia MD as Assigned PCP  GIORGI QUILES

## 2022-05-16 NOTE — PATIENT INSTRUCTIONS
Thank you for coming to the [facility] @ [place] [facility]; please note the following instructions:    1. Follow up with Dr. Clement in 6 months virtually        If you have any questions regarding your visit please contact your care team:     Cardiology  Telephone Number   Beronica JORDAN., RN  Delisa TIRADO, RN   Lyric PARRISH, RMA  Yoselin SOLIS, RMKATIA Hardin., [facility]  Earlene CASTREJON, Visit Facilitator   [phone] (option 1)   For scheduling appts:     [phone] (select option 1)       For the Device Clinic (Pacemakers and ICD's)  RN's :  Arleth Scanlon   During business hours: [phone]    *After business hours:  [phone] (select option 4)      Normal test result notifications will be released via [employer]Spring or mailed within 7 business days.  All other test results, will be communicated via telephone once reviewed by your cardiologist.    If you need a medication refill please contact your pharmacy.  Please allow 3 business days for your refill to be completed.    As always, thank you for trusting us with your health care needs!

## 2022-05-17 ENCOUNTER — TELEPHONE (OUTPATIENT)
Dept: CARDIOLOGY | Facility: CLINIC | Age: 62
End: 2022-05-17
Payer: COMMERCIAL

## 2022-05-17 DIAGNOSIS — I48.19 PERSISTENT ATRIAL FIBRILLATION (H): Primary | ICD-10-CM

## 2022-05-17 DIAGNOSIS — I10 BENIGN ESSENTIAL HYPERTENSION: ICD-10-CM

## 2022-05-17 NOTE — TELEPHONE ENCOUNTER
LVM for patient to return call to clinic scheduler to schedule echocardiogram.    YAMILETH Spence

## 2022-05-17 NOTE — TELEPHONE ENCOUNTER
Called patient to schedule him for his 6 month follow up with Dr. Clement. Patient asked if he should also be getting an echocardiogram/ testing before that follow up. There were no orders in. Told patient I would check with RN and call him to schedule any further testing if needed.    YAMILETH Spence

## 2022-05-17 NOTE — PROGRESS NOTES
"Date: 5/17/2022    Time of Call: 2:45 PM     Diagnosis: afib      \"TORB\" Ordering provider: Dr. Clement  Order: echocardiogram     Order received by:ISREAL Irwin      Follow-up/additional notes:pt to have follow-up with Dr. Clement in November.         "

## 2022-05-19 NOTE — TELEPHONE ENCOUNTER
Patient responded to [employer] message. I will schedule the echocardiogram, however the order is placed for provider: Betina Clement, not Vanessa Clement.    RN: can you please fix the ordering provider?    Thanks!  YAMILETH Spence

## 2022-06-01 ENCOUNTER — TELEPHONE (OUTPATIENT)
Dept: FAMILY MEDICINE | Facility: CLINIC | Age: 62
End: 2022-06-01
Payer: COMMERCIAL

## 2022-06-01 DIAGNOSIS — N52.9 ERECTILE DYSFUNCTION, UNSPECIFIED ERECTILE DYSFUNCTION TYPE: ICD-10-CM

## 2022-06-01 RX ORDER — SILDENAFIL 100 MG/1
50 TABLET, FILM COATED ORAL DAILY PRN
Qty: 30 TABLET | Refills: 4 | Status: SHIPPED | OUTPATIENT
Start: 2022-06-01 | End: 2022-10-05

## 2022-06-01 NOTE — TELEPHONE ENCOUNTER
Reason for Call:  Medication or medication refill:    Do you use a [facility]?  Name of the pharmacy and phone number for the current request:  [facility] [phone]    Name of the medication requested: sildenafil (REVATIO) 20 MG tablet    Other request: n/a    Can we leave a detailed message on this number? YES    Phone number patient can be reached at: Cell number on file:    Telephone Information:   Mobile [phone]       Best Time: Any    Call taken on 6/1/2022 at 8:25 AM by Demarco Sanabria

## 2022-08-19 ENCOUNTER — ANCILLARY PROCEDURE (OUTPATIENT)
Dept: CT IMAGING | Facility: CLINIC | Age: 62
End: 2022-08-19
Attending: INTERNAL MEDICINE
Payer: COMMERCIAL

## 2022-08-19 DIAGNOSIS — R91.8 PULMONARY NODULES: ICD-10-CM

## 2022-08-19 PROCEDURE — 71250 CT THORAX DX C-: CPT | Performed by: RADIOLOGY

## 2022-08-23 ENCOUNTER — VIRTUAL VISIT (OUTPATIENT)
Dept: PULMONOLOGY | Facility: CLINIC | Age: 62
End: 2022-08-23
Attending: INTERNAL MEDICINE
Payer: COMMERCIAL

## 2022-08-23 DIAGNOSIS — F17.211 CIGARETTE NICOTINE DEPENDENCE IN REMISSION: Primary | ICD-10-CM

## 2022-08-23 PROCEDURE — G0463 HOSPITAL OUTPT CLINIC VISIT: HCPCS | Mod: PN,RTG | Performed by: INTERNAL MEDICINE

## 2022-08-23 PROCEDURE — 99214 OFFICE O/P EST MOD 30 MIN: CPT | Mod: 95 | Performed by: INTERNAL MEDICINE

## 2022-08-23 NOTE — NURSING NOTE
Patient verified medications and allergies are correct via eCheck-in. Patient confirms no changes at this time and/or since last reviewed by clinic staff.    Roxane Mason, Virtual Facilitator

## 2022-08-23 NOTE — PROGRESS NOTES
Chandu is a 61 year old who is being evaluated via a billable video visit.      Patient stated he is in the state of [place] for the visit today.    How would you like to obtain your AVS? MyChart  If the video visit is dropped, the invitation should be resent by: Text to cell phone: [phone]  Will anyone else be joining your video visit? No      Video-Visit Details        Type of service:  Video Visit    See note for details    Originating Location (pt. Location): Work    Distant Location (provider location):  [facility] CANCER [facility]     Platform used for Video Visit: Reina Mason, Virtual Visit Facilitator

## 2022-08-23 NOTE — LETTER
8/23/2022       RE: Augusto Meeks  [address] [facility] [address]     Dear Colleague,    Thank you for referring your patient, Augusto Meeks, to the [facility] CLINIC at [facility]. Please see a copy of my visit note below.    Chandu is a 61 year old who is being evaluated via a billable video visit.      Patient stated he is in the state of [place] for the visit today.    How would you like to obtain your AVS? MyChart  If the video visit is dropped, the invitation should be resent by: Text to cell phone: [phone]  Will anyone else be joining your video visit? No      Video-Visit Details        Type of service:  Video Visit    See note for details    Originating Location (pt. Location): Work    Distant Location (provider location):  [facility] CANC[facility]     Platform used for Video Visit: Reina Mason, Virtual Visit Facilitator      LUNG NODULE & INTERVENTIONAL PULMONARY CLINIC  [facility], [facility]     Augusto Meeks MRN# [id number]   Age: 61 year old YOB: 1960     Reason for Consultation: lung nodule(s)       Assessment and Plan:      1. New multiple pulmonary lung nodule(s). Given the characteristics on current/previous imaging and risk factors; I would classify this to be Intermediate (6-65%) risk for cancer. Multiple bilateral sub6mm ggo's. No new changes. Will enroll in annual LDCT        Billing: I spent 30 minutes including face to face, chart review, personal and documented interpretation of results, counseling and coordination of care about the issues above.       Billing: I spent 30-39min (Established, 68664) on the date of the encounter doing chart review, history and exam, documentation and further activities as described in this note.    Hiram oCvarrubias MD  Associate Professor of Medicine  Section of Interventional  Pulmonology   Division of Pulmonary, Allergy, Critical Care and Sleep Medicine   [facility]  Pager: [phone]   Office: [phone]  Email: [email or web address]    Valerie WERNER RN  Interventional Pulmonary Care Coordinator  Office: [phone]  Email: [email or web address]    Milla Shelton  Interventional Pulmonology Surgery Scheduler  Office: [phone]  Email: [email or web address]         History:     Augusto Meeks is a 60 year old male with sig h/o for afib, GABRIEL who is here for evaluation/followup of lung nodule(s).     - No new resp sx or complaints. Denies dyspnea or cough.   - here for eval of nodules  - Personal hx of cancer: na  - Family hx of cancer: na  - Exposure hx: Denies asbestos or radon exposure   - Tobacco hx: Past Smoker: 1ppd for 30years. Quit 10yrs ago. Has an occasional cigar.  - My interpretation of the images relevant for this visit includes: ggo   - My interpretation of the PFT's relevant for this visit includes: None      Culprit Nodule(s):   1. sub6mm ggo bilaterally. No interval change in ggo's.      Other active medical problems include:   - has afib. Stable on BB and anticoagulation   - has GABRIEL. On CPAP           Past Medical History:      Past Medical History:   Diagnosis Date     A-fib (H) 2019     Antiplatelet or antithrombotic long-term use      Arrhythmia      Hypertension 1/20     Irregular heart beat      Sleep apnea            Past Surgical History:      Past Surgical History:   Procedure Laterality Date     ANESTHESIA CARDIOVERSION N/A 9/25/2019    Procedure: Anesthesia Coverage Transesophageal Echocardiogram, Cardioversion @0930;  Surgeon: GENERIC ANESTHESIA PROVIDER;  [facility]     ANESTHESIA CARDIOVERSION N/A 10/30/2019    Procedure: ANESTHESIA, FOR CARDIOVERSION @1100;  Surgeon: GENERIC ANESTHESIA PROVIDER;  [facility]     ANESTHESIA CARDIOVERSION N/A 12/5/2019    Procedure: CARDIOVERSION;  Surgeon: GENERIC ANESTHESIA PROVIDER;   [facility]     ANESTHESIA CARDIOVERSION N/A 2021    Procedure: ANESTHESIA, FOR CARDIOVERSION @1330;  Surgeon: GENERIC ANESTHESIA PROVIDER;  Location:  OR     CARDIAC SURGERY  21    Ablaution     CARDIOVERSION  10/30/2019    Patient reported he had a cardioverson on 2019.     EP ABLATION FOCAL AFIB N/A 2021    Procedure: EP ABLATION FOCAL AFIB;  Surgeon: Vanessa Clement MD;  Location:  HEART CARDIAC CATH LAB     EP ABLATION FOCAL AFIB N/A 2021    Procedure: EP ABLATION FOCAL AFIB W/INTRA OP DOT;  Surgeon: Prashanth Polanco MD;  Location:  HEART CARDIAC CATH LAB     VASECTOMY            Social History:     Social History     Tobacco Use     Smoking status: Former Smoker     Packs/day: 0.00     Years: 20.00     Pack years: 0.00     Types: Cigars     Start date: 1979     Quit date: 3/3/2011     Years since quittin.4     Smokeless tobacco: Never Used     Tobacco comment: occasional cigar   Substance Use Topics     Alcohol use: Yes     Comment: occas           Family History:     Family History   Problem Relation Age of Onset     Lung Cancer Mother      Cancer Father            Allergies:      Allergies   Allergen Reactions     Lisinopril Cough          Medications:     Current Outpatient Medications   Medication Sig     amLODIPine (NORVASC) 5 MG tablet Take 1 tablet (5 mg) by mouth daily     metoprolol succinate ER (TOPROL-XL) 50 MG 24 hr tablet Take 1.5 tablets (75 mg) by mouth daily     sildenafil (REVATIO) 20 MG tablet Take 1-5 tablets one hour before intercourse.     sildenafil (VIAGRA) 100 MG tablet Take 0.5 tablets (50 mg) by mouth daily as needed     No current facility-administered medications for this visit.          Review of Systems:     CONSTITUTIONAL: negative for fever, chills, change in weight  INTEGUMENTARY/SKIN: no rash or obvious new lesions  ENT/MOUTH: no sore throat, new sinus pain or nasal drainage  RESP: see interval history  CV: negative for chest pain,  palpitations or peripheral edema  GI: no nausea, vomiting, change in stools  : no dysuria  MUSCULOSKELETAL: no myalgias, arthralgias  ENDOCRINE: blood sugars with adequate control  PSYCHIATRIC: mood stable  LYMPHATIC: no new lymphadenopathy  HEME: no bleeding or easy bruisability  NEURO: no numbness, weakness, headaches         Physical Exam:     Constitutional - looks well, in no apparent distress  Respiratory -breathing appears comfortable.   Skin - No appreciable discoloration or lesions (very limited exam)  Neurological - No apparent tremors. Speech fluent and articlate          Current Laboratory Data:   All laboratory and imaging data reviewed.           Previous Chest Imaging   No images are attached to the encounter.         Previous Cardiology Imaging   No results found for this or any previous visit (from the past 8760 hour(s)).               Again, thank you for allowing me to participate in the care of your patient.      Sincerely,    Hiram Covarrubias MD

## 2022-08-23 NOTE — PROGRESS NOTES
LUNG NODULE & INTERVENTIONAL PULMONARY CLINIC  CL[facility]ICS [facility] SUR[facility]ERY [facility]ENTER, [facility]     Augusto Meeks MRN# [id number]   Age: 61 year old YOB: 1960     Reason for Consultation: lung nodule(s)       Assessment and Plan:      1. New multiple pulmonary lung nodule(s). Given the characteristics on current/previous imaging and risk factors; I would classify this to be Intermediate (6-65%) risk for cancer. Multiple bilateral sub6mm ggo's. No new changes. Will enroll in annual LDCT        Billing: I spent 30 minutes including face to face, chart review, personal and documented interpretation of results, counseling and coordination of care about the issues above.       Billing: I spent 30-39min (Established, 80982) on the date of the encounter doing chart review, history and exam, documentation and further activities as described in this note.    Hiram Covarrubias MD  Associate Professor of Medicine  Section of Interventional Pulmonology   Division of Pulmonary, Allergy, Critical Care and Sleep Medicine   [facility]  Pager: [phone]   Office: [phone]  Email: [email or web address]    Valerie WERNER, RN  Interventional Pulmonary Care Coordinator  Office: [phone]  Email: [email or web address]    Milla Shelton  Interventional Pulmonology Surgery Scheduler  Office: [phone]  Email: [email or web address]         History:     Augusto Meeks is a 60 year old male with sig h/o for afib, GABRIEL who is here for evaluation/followup of lung nodule(s).     - No new resp sx or complaints. Denies dyspnea or cough.   - here for eval of nodules  - Personal hx of cancer: na  - Family hx of cancer: na  - Exposure hx: Denies asbestos or radon exposure   - Tobacco hx: Past Smoker: 1ppd for 30years. Quit 10yrs ago. Has an occasional cigar.  - My interpretation of the images relevant for this visit includes: ggo   - My interpretation of the PFT's relevant  for this visit includes: None      Culprit Nodule(s):   1. sub6mm ggo bilaterally. No interval change in ggo's.      Other active medical problems include:   - has afib. Stable on BB and anticoagulation   - has GABRIEL. On CPAP           Past Medical History:      Past Medical History:   Diagnosis Date      (2019     Antiplatelet or antithrombotic long-term use      Arrhythmia      Hypertension      Irregular heart beat      Sleep apnea            Past Surgical History:      Past Surgical History:   Procedure Laterality Date     ANESTHESIA CARDIOVERSION N/A 2019    Procedure: Anesthesia Coverage Transesophageal Echocardiogram, Cardioversion @0930;  Surgeon: GENERIC ANESTHESIA PROVIDER;  Location:  OR     ANESTHESIA CARDIOVERSION N/A 10/30/2019    Procedure: ANESTHESIA, FOR CARDIOVERSION @1100;  Surgeon: GENERIC ANESTHESIA PROVIDER;  Location:  OR     ANESTHESIA CARDIOVERSION N/A 2019    Procedure: CARDIOVERSION;  Surgeon: GENERIC ANESTHESIA PROVIDER;  Location:  OR     ANESTHESIA CARDIOVERSION N/A 2021    Procedure: ANESTHESIA, FOR CARDIOVERSION @1330;  Surgeon: GENERIC ANESTHESIA PROVIDER;  Location:  OR     CARDIAC SURGERY  21    Ablaution     CARDIOVERSION  10/30/2019    Patient reported he had a cardioverson on 2019.     EP ABLATION FOCAL AFIB N/A 2021    Procedure: EP ABLATION FOCAL AFIB;  Surgeon: Vanessa Clement MD;  Location:  HEART CARDIAC CATH LAB     EP ABLATION FOCAL AFIB N/A 2021    Procedure: EP ABLATION FOCAL AFIB W/INTRA OP DTO;  Surgeon: Prashanth Polanco MD;  Location:  HEART CARDIAC CATH LAB     VASECTOMY            Social History:     Social History     Tobacco Use     Smoking status: Former Smoker     Packs/day: 0.00     Years: 20.00     Pack years: 0.00     Types: Cigars     Start date: 1979     Quit date: 3/3/2011     Years since quittin.4     Smokeless tobacco: Never Used     Tobacco comment: occasional cigar   Substance Use  Topics     Alcohol use: Yes     Comment: occas           Family History:     Family History   Problem Relation Age of Onset     Lung Cancer Mother      Cancer Father            Allergies:      Allergies   Allergen Reactions     Lisinopril Cough          Medications:     Current Outpatient Medications   Medication Sig     amLODIPine (NORVASC) 5 MG tablet Take 1 tablet (5 mg) by mouth daily     metoprolol succinate ER (TOPROL-XL) 50 MG 24 hr tablet Take 1.5 tablets (75 mg) by mouth daily     sildenafil (REVATIO) 20 MG tablet Take 1-5 tablets one hour before intercourse.     sildenafil (VIAGRA) 100 MG tablet Take 0.5 tablets (50 mg) by mouth daily as needed     No current facility-administered medications for this visit.          Review of Systems:     CONSTITUTIONAL: negative for fever, chills, change in weight  INTEGUMENTARY/SKIN: no rash or obvious new lesions  ENT/MOUTH: no sore throat, new sinus pain or nasal drainage  RESP: see interval history  CV: negative for chest pain, palpitations or peripheral edema  GI: no nausea, vomiting, change in stools  : no dysuria  MUSCULOSKELETAL: no myalgias, arthralgias  ENDOCRINE: blood sugars with adequate control  PSYCHIATRIC: mood stable  LYMPHATIC: no new lymphadenopathy  HEME: no bleeding or easy bruisability  NEURO: no numbness, weakness, headaches         Physical Exam:     Constitutional - looks well, in no apparent distress  Respiratory -breathing appears comfortable.   Skin - No appreciable discoloration or lesions (very limited exam)  Neurological - No apparent tremors. Speech fluent and articlate          Current Laboratory Data:   All laboratory and imaging data reviewed.           Previous Chest Imaging   No images are attached to the encounter.         Previous Cardiology Imaging   No results found for this or any previous visit (from the past 8760 hour(s)).

## 2022-10-05 ENCOUNTER — MYC MEDICAL ADVICE (OUTPATIENT)
Dept: FAMILY MEDICINE | Facility: CLINIC | Age: 62
End: 2022-10-05

## 2022-10-05 DIAGNOSIS — N52.9 ERECTILE DYSFUNCTION, UNSPECIFIED ERECTILE DYSFUNCTION TYPE: ICD-10-CM

## 2022-10-05 RX ORDER — SILDENAFIL 100 MG/1
50 TABLET, FILM COATED ORAL DAILY PRN
Qty: 30 TABLET | Refills: 4 | Status: SHIPPED | OUTPATIENT
Start: 2022-10-05 | End: 2023-01-19

## 2022-11-15 DIAGNOSIS — I10 BENIGN ESSENTIAL HYPERTENSION: ICD-10-CM

## 2022-11-17 RX ORDER — AMLODIPINE BESYLATE 5 MG/1
5 TABLET ORAL DAILY
Qty: 90 TABLET | Refills: 1 | Status: SHIPPED | OUTPATIENT
Start: 2022-11-17 | End: 2023-05-22

## 2023-01-19 ENCOUNTER — MYC MEDICAL ADVICE (OUTPATIENT)
Dept: FAMILY MEDICINE | Facility: CLINIC | Age: 63
End: 2023-01-19
Payer: COMMERCIAL

## 2023-01-19 DIAGNOSIS — N52.9 ERECTILE DYSFUNCTION, UNSPECIFIED ERECTILE DYSFUNCTION TYPE: ICD-10-CM

## 2023-01-19 RX ORDER — SILDENAFIL 100 MG/1
50 TABLET, FILM COATED ORAL DAILY PRN
Qty: 30 TABLET | Refills: 4 | Status: SHIPPED | OUTPATIENT
Start: 2023-01-19 | End: 2023-04-04

## 2023-02-10 DIAGNOSIS — I10 BENIGN ESSENTIAL HYPERTENSION: ICD-10-CM

## 2023-02-13 RX ORDER — METOPROLOL SUCCINATE 50 MG/1
75 TABLET, EXTENDED RELEASE ORAL DAILY
Qty: 135 TABLET | Refills: 0 | Status: SHIPPED | OUTPATIENT
Start: 2023-02-13 | End: 2023-05-22

## 2023-02-15 ASSESSMENT — ENCOUNTER SYMPTOMS
ARTHRALGIAS: 1
SHORTNESS OF BREATH: 1
HEARTBURN: 1
DYSURIA: 0
WEAKNESS: 0
COUGH: 0
CONSTIPATION: 0
HEMATOCHEZIA: 0
NAUSEA: 0
ABDOMINAL PAIN: 0
NERVOUS/ANXIOUS: 0
HEMATURIA: 0
SORE THROAT: 0
FEVER: 0
DIARRHEA: 0
MYALGIAS: 1
HEADACHES: 1
PALPITATIONS: 0
CHILLS: 0
DIZZINESS: 0
PARESTHESIAS: 0
JOINT SWELLING: 0
EYE PAIN: 0
FREQUENCY: 0

## 2023-02-16 ENCOUNTER — ANCILLARY PROCEDURE (OUTPATIENT)
Dept: CARDIOLOGY | Facility: CLINIC | Age: 63
End: 2023-02-16
Attending: INTERNAL MEDICINE
Payer: COMMERCIAL

## 2023-02-16 DIAGNOSIS — I48.19 PERSISTENT ATRIAL FIBRILLATION (H): ICD-10-CM

## 2023-02-16 DIAGNOSIS — I10 BENIGN ESSENTIAL HYPERTENSION: ICD-10-CM

## 2023-02-16 LAB — LVEF ECHO: NORMAL

## 2023-02-16 PROCEDURE — 93306 TTE W/DOPPLER COMPLETE: CPT | Performed by: INTERNAL MEDICINE

## 2023-02-21 RX ORDER — AMLODIPINE BESYLATE 5 MG/1
5 TABLET ORAL DAILY
Qty: 90 TABLET | Refills: 1 | Status: CANCELLED | OUTPATIENT
Start: 2023-02-21

## 2023-02-21 RX ORDER — METOPROLOL SUCCINATE 50 MG/1
75 TABLET, EXTENDED RELEASE ORAL DAILY
Qty: 135 TABLET | Refills: 0 | Status: CANCELLED | OUTPATIENT
Start: 2023-02-21

## 2023-02-21 ASSESSMENT — ENCOUNTER SYMPTOMS
PALPITATIONS: 0
CONSTIPATION: 0
FREQUENCY: 0
HEARTBURN: 1
HEADACHES: 1
CHILLS: 0
NAUSEA: 0
HEMATOCHEZIA: 0
NERVOUS/ANXIOUS: 0
MYALGIAS: 1
COUGH: 0
ABDOMINAL PAIN: 0
FEVER: 0
PARESTHESIAS: 0
SHORTNESS OF BREATH: 1
HEMATURIA: 0
SORE THROAT: 0
DYSURIA: 0
EYE PAIN: 0
WEAKNESS: 0
DIZZINESS: 0
JOINT SWELLING: 0
DIARRHEA: 0
ARTHRALGIAS: 1

## 2023-02-21 NOTE — PROGRESS NOTES
SUBJECTIVE:   CC: Chandu is an 62 year old who presents for preventative health visit.      Patient has been advised of split billing requirements and indicates understanding: Yes  Healthy Habits:     Getting at least 3 servings of Calcium per day:  NO    Bi-annual eye exam:  Yes    Dental care twice a year:  NO    Sleep apnea or symptoms of sleep apnea:  Sleep apnea    Diet:  Regular (no restrictions)    Frequency of exercise:  None    Taking medications regularly:  Yes    Medication side effects:  None    PHQ-2 Total Score: 0    Additional concerns today:  No  shingles vaccine was at [facility] pharmacy and 3rd covid vaccine, 08/2022            Today's PHQ-2 Score:   PHQ-2 ( 1999 [employer]) 2/15/2023   Q1: Little interest or pleasure in doing things 0   Q2: Feeling down, depressed or hopeless 0   PHQ-2 Score 0   PHQ-2 Total Score (12-17 Years)- Positive if 3 or more points; Administer PHQ-A if positive -   Q1: Little interest or pleasure in doing things Not at all   Q2: Feeling down, depressed or hopeless Not at all   PHQ-2 Score 0       Have you ever done Advance Care Planning? (For example, a Health Directive, POLST, or a discussion with a medical provider or your loved ones about your wishes): No, advance care planning information given to patient to review.  Patient declined advance care planning discussion at this time.    Social History     Tobacco Use     Smoking status: Former     Types: Cigars     Smokeless tobacco: Never     Tobacco comments:     occasional cigar   Substance Use Topics     Alcohol use: Yes     Comment: occas      If you drink alcohol do you typically have >3 drinks per day or >7 drinks per week? No    Alcohol Use 2/22/2023   Prescreen: >3 drinks/day or >7 drinks/week? -   Prescreen: >3 drinks/day or >7 drinks/week? No   AUDIT SCORE  -       Last PSA:   PSA   Date Value Ref Range Status   11/15/2019 2.07 0 - 4 ug/L Final     Comment:     Assay Method:  Chemiluminescence using Siemens Vista analyzer        Reviewed orders with patient. Reviewed health maintenance and updated orders accordingly - Yes      Reviewed and updated as needed this visit by clinical staff   Tobacco  Allergies  Meds              Reviewed and updated as needed this visit by Provider                     Review of Systems   Constitutional: Negative for chills and fever.   HENT: Positive for ear pain. Negative for congestion, hearing loss and sore throat.    Eyes: Negative for pain and visual disturbance.   Respiratory: Positive for shortness of breath. Negative for cough.    Cardiovascular: Negative for chest pain, palpitations and peripheral edema.   Gastrointestinal: Positive for heartburn. Negative for abdominal pain, constipation, diarrhea, hematochezia and nausea.   Genitourinary: Negative for dysuria, frequency, genital sores, hematuria, impotence, penile discharge and urgency.   Musculoskeletal: Positive for arthralgias and myalgias. Negative for joint swelling.   Skin: Negative for rash.   Neurological: Positive for headaches. Negative for dizziness, weakness and paresthesias.   Psychiatric/Behavioral: Negative for mood changes. The patient is not nervous/anxious.      Headaches are attributed sinus and helped with Zyrtec.  Cause headaches.     OBJECTIVE:   /76   Pulse 85   Temp 97.9  F (36.6  C) (Tympanic)   Resp 20   Ht 1.829 m (6')   Wt 109.8 kg (242 lb)   SpO2 100%   BMI 32.82 kg/m      Physical Exam  GENERAL: healthy, alert and no distress  EYES: Eyes grossly normal to inspection, PERRL and conjunctivae and sclerae normal  HENT: ear canals and TM's normal, nose and mouth without ulcers or lesions  NECK: no adenopathy, no asymmetry, masses, or scars and thyroid normal to palpation  RESP: lungs clear to auscultation - no rales, rhonchi or wheezes  CV: irregular rate and rhythm, normal S1 S2, no S3 or S4, no murmur, click or rub, no peripheral edema and peripheral pulses strong  ABDOMEN: soft, nontender, no  hepatosplenomegaly, no masses and bowel sounds normal  MS: no gross musculoskeletal defects noted, no edema  SKIN: no suspicious lesions or rashes  NEURO: Normal strength and tone, mentation intact and speech normal  PSYCH: mentation appears normal, affect normal/bright    Diagnostic Test Results:  Labs reviewed in Epic  pending    ASSESSMENT/PLAN:       ICD-10-CM    1. Routine general medical examination at a health care facility  Z00.00       2. Benign essential hypertension  I10           Patient has been advised of split billing requirements and indicates understanding: Yes      COUNSELING:   Reviewed preventive health counseling, as reflected in patient instructions       Regular exercise       Healthy diet/nutrition      BMI:   Estimated body mass index is 32.82 kg/m  as calculated from the following:    Height as of this encounter: 1.829 m (6').    Weight as of this encounter: 109.8 kg (242 lb).   Weight management plan: less calories      He reports that he has quit smoking. His smoking use included cigars. He has never used smokeless tobacco.         Singh Valencia MD  [facility]

## 2023-02-22 ENCOUNTER — OFFICE VISIT (OUTPATIENT)
Dept: FAMILY MEDICINE | Facility: CLINIC | Age: 63
End: 2023-02-22
Payer: COMMERCIAL

## 2023-02-22 VITALS
BODY MASS INDEX: 32.78 KG/M2 | TEMPERATURE: 97.9 F | DIASTOLIC BLOOD PRESSURE: 76 MMHG | HEIGHT: 72 IN | HEART RATE: 85 BPM | SYSTOLIC BLOOD PRESSURE: 137 MMHG | WEIGHT: 242 LBS | OXYGEN SATURATION: 100 % | RESPIRATION RATE: 20 BRPM

## 2023-02-22 DIAGNOSIS — R19.5 POSITIVE COLORECTAL CANCER SCREENING USING COLOGUARD TEST: ICD-10-CM

## 2023-02-22 DIAGNOSIS — I10 ESSENTIAL HYPERTENSION: ICD-10-CM

## 2023-02-22 DIAGNOSIS — Z00.00 ROUTINE GENERAL MEDICAL EXAMINATION AT A HEALTH CARE FACILITY: Primary | ICD-10-CM

## 2023-02-22 DIAGNOSIS — Z12.5 SCREENING FOR PROSTATE CANCER: ICD-10-CM

## 2023-02-22 LAB
ALBUMIN SERPL-MCNC: 4.3 G/DL (ref 3.4–5)
ALP SERPL-CCNC: 73 U/L (ref 40–150)
ALT SERPL W P-5'-P-CCNC: 41 U/L (ref 0–70)
ANION GAP SERPL CALCULATED.3IONS-SCNC: 5 MMOL/L (ref 3–14)
AST SERPL W P-5'-P-CCNC: 25 U/L (ref 0–45)
BILIRUB SERPL-MCNC: 1.2 MG/DL (ref 0.2–1.3)
BUN SERPL-MCNC: 11 MG/DL (ref 7–30)
CALCIUM SERPL-MCNC: 9.7 MG/DL (ref 8.5–10.1)
CHLORIDE BLD-SCNC: 105 MMOL/L (ref 94–109)
CHOLEST SERPL-MCNC: 199 MG/DL
CO2 SERPL-SCNC: 29 MMOL/L (ref 20–32)
CREAT SERPL-MCNC: 0.9 MG/DL (ref 0.66–1.25)
FASTING STATUS PATIENT QL REPORTED: NO
GFR SERPL CREATININE-BSD FRML MDRD: >90 ML/MIN/1.73M2
GLUCOSE BLD-MCNC: 108 MG/DL (ref 70–99)
HDLC SERPL-MCNC: 53 MG/DL
LDLC SERPL CALC-MCNC: 117 MG/DL
NONHDLC SERPL-MCNC: 146 MG/DL
POTASSIUM BLD-SCNC: 4.5 MMOL/L (ref 3.4–5.3)
PROT SERPL-MCNC: 8.1 G/DL (ref 6.8–8.8)
PSA SERPL-MCNC: 4.18 UG/L (ref 0–4)
SODIUM SERPL-SCNC: 139 MMOL/L (ref 133–144)
TRIGL SERPL-MCNC: 145 MG/DL

## 2023-02-22 PROCEDURE — 36415 COLL VENOUS BLD VENIPUNCTURE: CPT | Performed by: FAMILY MEDICINE

## 2023-02-22 PROCEDURE — G0103 PSA SCREENING: HCPCS | Performed by: FAMILY MEDICINE

## 2023-02-22 PROCEDURE — 80061 LIPID PANEL: CPT | Performed by: FAMILY MEDICINE

## 2023-02-22 PROCEDURE — 80053 COMPREHEN METABOLIC PANEL: CPT | Performed by: FAMILY MEDICINE

## 2023-02-22 PROCEDURE — 99396 PREV VISIT EST AGE 40-64: CPT | Performed by: FAMILY MEDICINE

## 2023-02-22 ASSESSMENT — PAIN SCALES - GENERAL: PAINLEVEL: NO PAIN (0)

## 2023-03-06 ENCOUNTER — VIRTUAL VISIT (OUTPATIENT)
Dept: CARDIOLOGY | Facility: CLINIC | Age: 63
End: 2023-03-06
Payer: COMMERCIAL

## 2023-03-06 DIAGNOSIS — I48.21 PERMANENT ATRIAL FIBRILLATION (H): ICD-10-CM

## 2023-03-06 DIAGNOSIS — I48.19 PERSISTENT ATRIAL FIBRILLATION (H): ICD-10-CM

## 2023-03-06 DIAGNOSIS — Z98.890 S/P ABLATION OF ATRIAL FIBRILLATION: ICD-10-CM

## 2023-03-06 DIAGNOSIS — Z86.79 S/P ABLATION OF ATRIAL FIBRILLATION: ICD-10-CM

## 2023-03-06 DIAGNOSIS — R06.09 DYSPNEA ON EXERTION: Primary | ICD-10-CM

## 2023-03-06 PROCEDURE — 99214 OFFICE O/P EST MOD 30 MIN: CPT | Mod: VID | Performed by: INTERNAL MEDICINE

## 2023-03-06 NOTE — PROGRESS NOTES
Video-Visit Details     Type of service:  Video Visit     Originating Location (pt. Location): Work  Distant Location (provider location):  Off-site  Platform used for Video Visit: [id number]     Video start time: 1:55 PM  Video end time: 2:08 PM    HPI: Purpose of visit: Follow-up for atrial fibrillation    Mr. Augusto Meeks is a 62-year-old gentleman with a medical history significant for hypertension, obesity and permanent atrial fibrillation. The patient does not have any history of diabetes, coronary artery disease, previous strokes, TIAs or heart failure.     Patient's last visit with me was in March 2022.  In the last 1 year patient has been doing well from an atrial fibrillation standpoint.  He did not report any symptoms of irregular heartbeat sensation or palpitations.  However he did report dyspnea on exertion in climbing a few flights of stairs.  He did not report exertional angina, frequent lightheadedness, presyncope or syncope.    A transthoracic echocardiogram showed preserved ejection fraction of 65 to 70% with evidence of left atrial enlargement.    PAST MEDICAL HISTORY:  Past Medical History:   Diagnosis Date     A-fib (H) 2019     Antiplatelet or antithrombotic long-term use      Arrhythmia      Hypertension 1/20     Irregular heart beat      Sleep apnea        CURRENT MEDICATIONS:  Current Outpatient Medications   Medication Sig Dispense Refill     amLODIPine (NORVASC) 5 MG tablet Take 1 tablet (5 mg) by mouth daily 90 tablet 1     metoprolol succinate ER (TOPROL XL) 50 MG 24 hr tablet Take 1.5 tablets (75 mg) by mouth daily 135 tablet 0     sildenafil (VIAGRA) 100 MG tablet Take 0.5 tablets (50 mg) by mouth daily as needed 30 tablet 4       PAST SURGICAL HISTORY:  Past Surgical History:   Procedure Laterality Date     ANESTHESIA CARDIOVERSION N/A 9/25/2019    Procedure: Anesthesia Coverage Transesophageal Echocardiogram, Cardioversion @0930;  Surgeon: GENERIC ANESTHESIA PROVIDER;  [facility]      ANESTHESIA CARDIOVERSION N/A 10/30/2019    Procedure: ANESTHESIA, FOR CARDIOVERSION @1100;  Surgeon: GENERIC ANESTHESIA PROVIDER;  [facility]     ANESTHESIA CARDIOVERSION N/A 12/5/2019    Procedure: CARDIOVERSION;  Surgeon: GENERIC ANESTHESIA PROVIDER;  [facility]     ANESTHESIA CARDIOVERSION N/A 5/21/2021    Procedure: ANESTHESIA, FOR CARDIOVERSION @1330;  Surgeon: GENERIC ANESTHESIA PROVIDER;  [facility]     CARDIAC SURGERY  2/22/21    Ablaution     CARDIOVERSION  10/30/2019    Patient reported he had a cardioverson on 09/25/2019.     EP ABLATION FOCAL AFIB N/A 1/22/2021    Procedure: EP ABLATION FOCAL AFIB;  Surgeon: Vanessa Clement MD;  Location:  HEART CARDIAC CATH LAB     EP ABLATION FOCAL AFIB N/A 11/24/2021    Procedure: EP ABLATION FOCAL AFIB W/INTRA OP DOT;  Surgeon: Prashanth Polanco MD;  Location:  HEART CARDIAC CATH LAB     VASECTOMY         ALLERGIES:     Allergies   Allergen Reactions     Lisinopril Cough       FAMILY HISTORY:  - Premature coronary artery disease  - Atrial fibrillation  - Sudden cardiac death     SOCIAL HISTORY:  Social History     Tobacco Use     Smoking status: Former     Types: Cigars     Smokeless tobacco: Never     Tobacco comments:     occasional cigar   Vaping Use     Vaping Use: Never used   Substance Use Topics     Alcohol use: Yes     Comment: occas      Drug use: Yes     Types: Marijuana     Comment: occas        ROS:   Constitutional: No fever, chills, or sweats. Weight stable.   ENT: No visual disturbance, ear ache, epistaxis, sore throat.   Cardiovascular: As per HPI.   Respiratory: No cough, hemoptysis.    GI: No nausea, vomiting, hematemesis, melena, or hematochezia.   : No hematuria.   Integument: Negative.   Psychiatric: Negative.   Hematologic:  Easy bruising, no easy bleeding.  Neuro: Negative.   Endocrinology: No significant heat or cold intolerance   Musculoskeletal: No myalgia.    Exam:  There were no vitals taken for this visit.  GENERAL  APPEARANCE: healthy, alert and no distress     Labs:  CBC RESULTS:   Lab Results   Component Value Date    WBC 6.5 11/24/2021    WBC 5.3 01/22/2021    RBC 4.20 (L) 11/24/2021    RBC 3.88 (L) 01/22/2021    HGB 14.4 11/24/2021    HGB 12.9 (L) 01/22/2021    HCT 40.3 11/24/2021    HCT 37.3 (L) 01/22/2021    MCV 96 11/24/2021    MCV 96 01/22/2021    MCH 34.3 (H) 11/24/2021    MCH 33.2 (H) 01/22/2021    MCHC 35.7 11/24/2021    MCHC 34.6 01/22/2021    RDW 13.4 11/24/2021    RDW 14.9 01/22/2021     (L) 11/24/2021     (L) 01/22/2021       BMP RESULTS:  Lab Results   Component Value Date     02/22/2023     01/22/2021    POTASSIUM 4.5 02/22/2023    POTASSIUM 4.3 05/21/2021    CHLORIDE 105 02/22/2023    CHLORIDE 108 01/22/2021    CO2 29 02/22/2023    CO2 25 01/22/2021    ANIONGAP 5 02/22/2023    ANIONGAP 6 01/22/2021     (H) 02/22/2023     (H) 01/22/2021    BUN 11 02/22/2023    BUN 13 01/22/2021    CR 0.90 02/22/2023    CR 0.82 01/22/2021    GFRESTIMATED >90 02/22/2023    GFRESTIMATED >90 01/22/2021    GFRESTBLACK >90 01/22/2021    CHALINO 9.7 02/22/2023    CHALINO 8.7 01/22/2021        INR RESULTS:  Lab Results   Component Value Date    INR 1.26 (H) 11/24/2021    INR 1.31 (H) 11/19/2021    INR 1.39 (H) 09/25/2019       Procedures:      Assessment and Plan:     Permanent atrial fibrillation-ventricular rate well controlled by metoprolol XL 75 mg once daily    Dyspnea on exertion for investigation    I discussed extensively with patient the management steps to evaluate dyspnea on exertion.  I explained the importance of ruling out myocardial ischemia due to coronary artery disease.  I discussed the aims, risks, and alternatives to Lexiscan nuclear stress test.  After an extensive discussion, patient agreed to proceed with a Lexiscan nuclear stress test.    I also discussed the aims, risks, and alternatives to oral anticoagulation to prevent stroke given his risk factor of hypertension.  In  addition, patient has evidence of left atrial enlargement.  After an extensive discussion, patient agreed to start oral anticoagulation.  We will  prescribe a direct oral anticoagulant that s covered by his insurance plan.    I will see patient again by video visit after the Lexiscan nuclear stress test has been completed.  All questions and concerns were addressed and patient is happy with the plan.    CC  Patient Care Team:  Singh Valencia MD as PCP - General (Family Medicine)  Vanessa Clement MD as Assigned Heart and Vascular Provider  Hiram Covarrubias MD as MD (Critical Care)  Vanessa Clement MD as Referring Physician (Clinical Cardiac Electrophysiology)  Masoud Chavez MD as Assigned Surgical Provider  Singh Valencia MD as Assigned PCP  Hiram Covarrubias MD as Assigned Pulmonology Provider

## 2023-03-06 NOTE — NURSING NOTE
Chandu is a 62 year old who is being evaluated via a billable video visit.       How would you like to obtain your AVS? MyChart  If the video visit is dropped, the invitation should be resent by: Text to cell phone: [phone]  Will anyone else be joining your video visit? No       Video-Visit Details     Originating Location (pt. Location): Home       Distant Location (provider location):  Off-site     Platform used for Video Visit: Doximity    Chest Pain/Tightness/Pressure: No  SOB or CADE: CADE mostly with stairs  Heart Palpitations or fluttering: Occasionally  Lightheadedness or Dizziness: Occasionally  Stamina: No issues    Weight: 230 lbs  Height: 5 ft 11 in    Reema Niño CMA (AAMA)

## 2023-03-06 NOTE — LETTER
3/6/2023      RE: Augusto Meeks  [address] [facility] [address]       Dear Colleague,    Thank you for the opportunity to participate in the care of your patient, Augusto Meeks, at the [facility]. Please see a copy of my visit note below.    Video-Visit Details     Type of service:  Video Visit     Originating Location (pt. Location): Work  Distant Location (provider location):  Off-site  Platform used for Video Visit: Gutenbergz     Video start time: 1:55 PM  Video end time: 2:08 PM    HPI: Purpose of visit: Follow-up for atrial fibrillation    Mr. Augusto Meeks is a 62-year-old gentleman with a medical history significant for hypertension, obesity and permanent atrial fibrillation. The patient does not have any history of diabetes, coronary artery disease, previous strokes, TIAs or heart failure.     Patient's last visit with me was in March 2022.  In the last 1 year patient has been doing well from an atrial fibrillation standpoint.  He did not report any symptoms of irregular heartbeat sensation or palpitations.  However he did report dyspnea on exertion in climbing a few flights of stairs.  He did not report exertional angina, frequent lightheadedness, presyncope or syncope.    A transthoracic echocardiogram showed preserved ejection fraction of 65 to 70% with evidence of left atrial enlargement.    PAST MEDICAL HISTORY:  Past Medical History:   Diagnosis Date     A-fib (H) 2019     Antiplatelet or antithrombotic long-term use      Arrhythmia      Hypertension 1/20     Irregular heart beat      Sleep apnea        CURRENT MEDICATIONS:  Current Outpatient Medications   Medication Sig Dispense Refill     amLODIPine (NORVASC) 5 MG tablet Take 1 tablet (5 mg) by mouth daily 90 tablet 1     metoprolol succinate ER (TOPROL XL) 50 MG 24 hr tablet Take 1.5 tablets (75 mg) by mouth daily 135 tablet 0     sildenafil (VIAGRA)  100 MG tablet Take 0.5 tablets (50 mg) by mouth daily as needed 30 tablet 4       PAST SURGICAL HISTORY:  Past Surgical History:   Procedure Laterality Date     ANESTHESIA CARDIOVERSION N/A 9/25/2019    Procedure: Anesthesia Coverage Transesophageal Echocardiogram, Cardioversion @0930;  Surgeon: GENERIC ANESTHESIA PROVIDER;  [facility]     ANESTHESIA CARDIOVERSION N/A 10/30/2019    Procedure: ANESTHESIA, FOR CARDIOVERSION @1100;  Surgeon: GENERIC ANESTHESIA PROVIDER;  [facility]     ANESTHESIA CARDIOVERSION N/A 12/5/2019    Procedure: CARDIOVERSION;  Surgeon: GENERIC ANESTHESIA PROVIDER;  [facility]     ANESTHESIA CARDIOVERSION N/A 5/21/2021    Procedure: ANESTHESIA, FOR CARDIOVERSION @1330;  Surgeon: GENERIC ANESTHESIA PROVIDER;  Location:  OR     CARDIAC SURGERY  2/22/21    Ablaution     CARDIOVERSION  10/30/2019    Patient reported he had a cardioverson on 09/25/2019.     EP ABLATION FOCAL AFIB N/A 1/22/2021    Procedure: EP ABLATION FOCAL AFIB;  Surgeon: Vanessa Clement MD;  Location:  HEART CARDIAC CATH LAB     EP ABLATION FOCAL AFIB N/A 11/24/2021    Procedure: EP ABLATION FOCAL AFIB W/INTRA OP DOT;  Surgeon: Prashanth Polanco MD;  Location:  HEART CARDIAC CATH LAB     VASECTOMY         ALLERGIES:     Allergies   Allergen Reactions     Lisinopril Cough       FAMILY HISTORY:  - Premature coronary artery disease  - Atrial fibrillation  - Sudden cardiac death     SOCIAL HISTORY:  Social History     Tobacco Use     Smoking status: Former     Types: Cigars     Smokeless tobacco: Never     Tobacco comments:     occasional cigar   Vaping Use     Vaping Use: Never used   Substance Use Topics     Alcohol use: Yes     Comment: occas      Drug use: Yes     Types: Marijuana     Comment: occas        ROS:   Constitutional: No fever, chills, or sweats. Weight stable.   ENT: No visual disturbance, ear ache, epistaxis, sore throat.   Cardiovascular: As per HPI.   Respiratory: No cough, hemoptysis.    GI: No  nausea, vomiting, hematemesis, melena, or hematochezia.   : No hematuria.   Integument: Negative.   Psychiatric: Negative.   Hematologic:  Easy bruising, no easy bleeding.  Neuro: Negative.   Endocrinology: No significant heat or cold intolerance   Musculoskeletal: No myalgia.    Exam:  There were no vitals taken for this visit.  GENERAL APPEARANCE: healthy, alert and no distress     Labs:  CBC RESULTS:   Lab Results   Component Value Date    WBC 6.5 11/24/2021    WBC 5.3 01/22/2021    RBC 4.20 (L) 11/24/2021    RBC 3.88 (L) 01/22/2021    HGB 14.4 11/24/2021    HGB 12.9 (L) 01/22/2021    HCT 40.3 11/24/2021    HCT 37.3 (L) 01/22/2021    MCV 96 11/24/2021    MCV 96 01/22/2021    MCH 34.3 (H) 11/24/2021    MCH 33.2 (H) 01/22/2021    MCHC 35.7 11/24/2021    MCHC 34.6 01/22/2021    RDW 13.4 11/24/2021    RDW 14.9 01/22/2021     (L) 11/24/2021     (L) 01/22/2021       BMP RESULTS:  Lab Results   Component Value Date     02/22/2023     01/22/2021    POTASSIUM 4.5 02/22/2023    POTASSIUM 4.3 05/21/2021    CHLORIDE 105 02/22/2023    CHLORIDE 108 01/22/2021    CO2 29 02/22/2023    CO2 25 01/22/2021    ANIONGAP 5 02/22/2023    ANIONGAP 6 01/22/2021     (H) 02/22/2023     (H) 01/22/2021    BUN 11 02/22/2023    BUN 13 01/22/2021    CR 0.90 02/22/2023    CR 0.82 01/22/2021    GFRESTIMATED >90 02/22/2023    GFRESTIMATED >90 01/22/2021    GFRESTBLACK >90 01/22/2021    CHALINO 9.7 02/22/2023    CHALINO 8.7 01/22/2021        INR RESULTS:  Lab Results   Component Value Date    INR 1.26 (H) 11/24/2021    INR 1.31 (H) 11/19/2021    INR 1.39 (H) 09/25/2019       Procedures:      Assessment and Plan:     Permanent atrial fibrillation-ventricular rate well controlled by metoprolol XL 75 mg once daily    Dyspnea on exertion for investigation    I discussed extensively with patient the management steps to evaluate dyspnea on exertion.  I explained the importance of ruling out myocardial ischemia due to  coronary artery disease.  I discussed the aims, risks, and alternatives to Lexiscan nuclear stress test.  After an extensive discussion, patient agreed to proceed with a Lexiscan nuclear stress test.    I also discussed the aims, risks, and alternatives to oral anticoagulation to prevent stroke given his risk factor of hypertension.  In addition, patient has evidence of left atrial enlargement.  After an extensive discussion, patient agreed to start oral anticoagulation.  We will  prescribe a direct oral anticoagulant that s covered by his insurance plan.    I will see patient again by video visit after the Lexiscan nuclear stress test has been completed.  All questions and concerns were addressed and patient is happy with the plan.    Please do not hesitate to contact me if you have any questions/concerns.     Sincerely,     Vanessa Clement MD    CC  Patient Care Team:  Singh Valencia MD as PCP - General (Family Medicine)  Vanessa Clement MD as Referring Physician (Clinical Cardiac Electrophysiology)  Masoud Chavez MD as Assigned Surgical Provider  Singh Valencia MD as Assigned PCP  Hiram Covarrubias MD as Assigned Pulmonology Provider

## 2023-03-06 NOTE — PATIENT INSTRUCTIONS
"Thank you for coming to the [facility] at [place]; please note the following instructions:    1.  Call for Lexiscan - [phone]    2. Video visit with Dr. Clement after results of lexiscan    3. Consider Xarelto or Apixban depending on cost and coverage     Information for Lexiscan Test    Test can take up to 4 hours.  Nothing to eat 3 hours prior.  Water is permitted.  No caffiene 12 hours prior.  Wear comfortable clothing.    Cardiac nuclear imaging measures the flow of blood in your heart at rest and then during exercise. The images are compared to determine whether there are any blockages in the arteries, changes in blood flow or oxygen supply from resting to the stressed state, areas of scar tissue, or if there has been a prior heart attack. It also measures how well the heart muscle squeezes and pumps. The test is also sometimes called a \"perfusion scan\" or a \"SPECT MPI\" (single photon emission computed tomography myocardial perfusion imaging). For the scan, a small amount of radioactive material called \"tracer\" is delivered into the bloodstream. A special camera then scans the tracer in the blood as it flows through the heart muscle. Areas of the heart that have good blood flow absorb the tracer. Areas that are not getting enough blood will not absorb the tracer. This can be a sign of a blocked artery, vessel narrowing, or any area of the heart not receiving blood, perhaps as a result of damage from a heart attack. The tracer leaves your body within hours. This test can be done in a hospital or test center.    During your test  Here is what to expect during your test:    You may be asked to change into a hospital gown from the waist up.    You will be attached to EKG, which monitors your heart rhythm, and blood pressure monitors. An IV (intravenous) line will be started in your arm.    At some point, scanning pictures will be taken while you rest. This may be done before you exercise or " you may be asked to return for resting scans later that day or the next.    You will exercise on a treadmill or stationary bike for a few minutes. This increases the rate of blood flow to your heart muscle.    Speak up when you feel that you cannot exercise for even 1 more minute. At this point, the tracer is given to you through the IV, as this is considered the point of maximum stress.     If you cannot exercise by using a treadmill or bicycle, special medicines can be used to artificially increase heart rate while you are resting. This is done to put your heart under stress.     After you have received the tracer, you will be positioned on the scanning bed.    You must lie very still for up to 30 minutes. During this time, a scanning camera will be taking pictures of your heart. The images will show where blood flows through your heart muscle.  After your test  Before going home, ask when you may eat. Also, find out when to resume taking any medicines you were told to skip before the test. If you need to return for resting scans, follow any instructions. Most people can go back to their normal routine as soon as all parts of the test are finished. Drink plenty of water to help flush the tracer from your body.  Let the technologist know  Inform the technologist about the following:    What medicines you take    If you have diabetes, knee or hip problems, arthritis, asthma, or chronic lung disease    Recent chest pain since your last appointment    Inability to participate in exercise     If you have had a stroke or have vascular disease of the leg    If you are pregnant, think you might be, or are nursing  Report any symptoms  Be sure to tell the healthcare provider if you feel any of the following during the test:    Chest, arm, or jaw discomfort    Severe shortness of breath    Dizziness or lightheadedness    Feelings of panic    Inability to participate in exercise     Palpitations    Leg cramps or  pain       If you have any questions regarding your visit, please contact your care team:     CARDIOLOGY  TELEPHONE NUMBER   Beronica HINKLE, Registered Nurse  Delisa TIRADO, Registered Nurse  Dora GONZALEZ, Registered Nurse  Yoselin SOLIS, Registered Medical Assistant  Reema HSIEH, Certified Medical Assistant  Earlene CASTREJON, Visit Facilitator [phone] (select option 1)    *After hours: [phone]   For Scheduling Appts:     [phone] (select option 1)    *After hours: [phone]   For the Device Clinic (Pacemakers and ICD's)  Arleth CHAVARRIA Registered Nurse   During business hours: [phone]    *After business hours:  [phone] (select option 4)      Normal test result notifications will be released via Planet[employer] or mailed within 7 business days.  All other test results, will be communicated via telephone once reviewed by your cardiologist.    If you need a medication refill, please contact your pharmacy.  Please allow 3 business days for your refill to be completed.    As always, thank you for trusting us with your health care needs!

## 2023-03-06 NOTE — NURSING NOTE
Cardiac Testing: Patient given instructions regarding  stress echocardiogram . Discussed purpose, preparation, procedure and when to expect results reported back to the patient. Patient demonstrated understanding of this information and agreed to call with further questions or concerns. Discussed lexiscan preparation and procedure.     Patient stated he understood all health information given and agreed to call with further questions or concerns.    Dora Andersen RN, BSN  Cardiology RN Care Coordinator   [place]   Phone: [phone]  Fax: [phone] ([place]) [phone] ([place])

## 2023-03-09 ENCOUNTER — MYC MEDICAL ADVICE (OUTPATIENT)
Dept: CARDIOLOGY | Facility: CLINIC | Age: 63
End: 2023-03-09
Payer: COMMERCIAL

## 2023-03-09 DIAGNOSIS — I48.19 PERSISTENT ATRIAL FIBRILLATION (H): Primary | ICD-10-CM

## 2023-03-15 ENCOUNTER — ANCILLARY PROCEDURE (OUTPATIENT)
Dept: NUCLEAR MEDICINE | Facility: CLINIC | Age: 63
End: 2023-03-15
Attending: INTERNAL MEDICINE
Payer: COMMERCIAL

## 2023-03-15 DIAGNOSIS — R06.09 DYSPNEA ON EXERTION: ICD-10-CM

## 2023-03-15 LAB
CV STRESS MAX HR HE: 77
RATE PRESSURE PRODUCT: NORMAL
STRESS ECHO BASELINE DIASTOLIC HE: 88
STRESS ECHO BASELINE HR: 76 BPM
STRESS ECHO BASELINE SYSTOLIC BP: 151
STRESS ECHO CALCULATED PERCENT HR: 48 %
STRESS ECHO LAST STRESS DIASTOLIC BP: 78
STRESS ECHO LAST STRESS SYSTOLIC BP: 136
STRESS ECHO TARGET HR: 159

## 2023-03-15 PROCEDURE — 78452 HT MUSCLE IMAGE SPECT MULT: CPT | Performed by: INTERNAL MEDICINE

## 2023-03-15 PROCEDURE — 93016 CV STRESS TEST SUPVJ ONLY: CPT | Performed by: INTERNAL MEDICINE

## 2023-03-15 PROCEDURE — 93017 CV STRESS TEST TRACING ONLY: CPT | Performed by: INTERNAL MEDICINE

## 2023-03-15 PROCEDURE — A9502 TC99M TETROFOSMIN: HCPCS | Performed by: INTERNAL MEDICINE

## 2023-03-15 PROCEDURE — 93018 CV STRESS TEST I&R ONLY: CPT | Performed by: INTERNAL MEDICINE

## 2023-03-15 RX ORDER — REGADENOSON 0.08 MG/ML
0.4 INJECTION, SOLUTION INTRAVENOUS ONCE
Status: COMPLETED | OUTPATIENT
Start: 2023-03-15 | End: 2023-03-15

## 2023-03-15 RX ADMIN — REGADENOSON 0.4 MG: 0.08 INJECTION, SOLUTION INTRAVENOUS at 10:56

## 2023-03-20 ENCOUNTER — MYC MEDICAL ADVICE (OUTPATIENT)
Dept: CARDIOLOGY | Facility: CLINIC | Age: 63
End: 2023-03-20
Payer: COMMERCIAL

## 2023-03-20 DIAGNOSIS — I48.19 PERSISTENT ATRIAL FIBRILLATION (H): Primary | ICD-10-CM

## 2023-03-20 DIAGNOSIS — R06.09 DYSPNEA ON EXERTION: ICD-10-CM

## 2023-04-04 ENCOUNTER — MYC MEDICAL ADVICE (OUTPATIENT)
Dept: FAMILY MEDICINE | Facility: CLINIC | Age: 63
End: 2023-04-04
Payer: COMMERCIAL

## 2023-04-04 DIAGNOSIS — N52.9 ERECTILE DYSFUNCTION, UNSPECIFIED ERECTILE DYSFUNCTION TYPE: ICD-10-CM

## 2023-04-04 RX ORDER — SILDENAFIL 100 MG/1
50 TABLET, FILM COATED ORAL DAILY PRN
Qty: 30 TABLET | Refills: 4 | Status: SHIPPED | OUTPATIENT
Start: 2023-04-04 | End: 2023-09-25

## 2023-04-07 ENCOUNTER — MYC MEDICAL ADVICE (OUTPATIENT)
Dept: FAMILY MEDICINE | Facility: CLINIC | Age: 63
End: 2023-04-07
Payer: COMMERCIAL

## 2023-04-07 DIAGNOSIS — N52.9 ERECTILE DYSFUNCTION, UNSPECIFIED ERECTILE DYSFUNCTION TYPE: ICD-10-CM

## 2023-04-07 RX ORDER — SILDENAFIL 100 MG/1
50 TABLET, FILM COATED ORAL DAILY PRN
Qty: 30 TABLET | Refills: 4 | OUTPATIENT
Start: 2023-04-07

## 2023-04-20 DIAGNOSIS — R91.8 PULMONARY NODULES: Primary | ICD-10-CM

## 2023-04-23 ENCOUNTER — HEALTH MAINTENANCE LETTER (OUTPATIENT)
Age: 63
End: 2023-04-23

## 2023-05-05 ENCOUNTER — MYC MEDICAL ADVICE (OUTPATIENT)
Dept: CARDIOLOGY | Facility: CLINIC | Age: 63
End: 2023-05-05
Payer: COMMERCIAL

## 2023-05-05 DIAGNOSIS — I48.19 PERSISTENT ATRIAL FIBRILLATION (H): Primary | ICD-10-CM

## 2023-05-08 NOTE — TELEPHONE ENCOUNTER
Spoke to patient.  Patient reports that Eliquis does not agree with him, dry hacking cough and tenderness in kidneys symptoms have subsided since he stopped Eliquis.  Wants to see if Xarelto is covered-has been on this in the past and he tolerated it fine.  He does not want to start warfarin at this time.  Writer explained that this will be discussed with Dr. Clement in clinic today and we will go from there.  Patient verbalized understanding.    Beronica Montoya RN  Cardiology Care Coordinator  [facility]  [phone] option 1

## 2023-05-18 DIAGNOSIS — I10 BENIGN ESSENTIAL HYPERTENSION: ICD-10-CM

## 2023-05-22 RX ORDER — AMLODIPINE BESYLATE 5 MG/1
5 TABLET ORAL DAILY
Qty: 90 TABLET | Refills: 1 | Status: SHIPPED | OUTPATIENT
Start: 2023-05-22 | End: 2023-11-02

## 2023-05-22 RX ORDER — METOPROLOL SUCCINATE 50 MG/1
75 TABLET, EXTENDED RELEASE ORAL DAILY
Qty: 135 TABLET | Refills: 2 | Status: SHIPPED | OUTPATIENT
Start: 2023-05-22 | End: 2024-02-01

## 2023-05-22 NOTE — TELEPHONE ENCOUNTER
metoprolol succinate ER (TOPROL XL) 50 MG 24 hr tablet    3/6/2023  [facility] Vanessa Rodriguez MD  Cardiovascular Disease

## 2023-05-23 ENCOUNTER — TELEPHONE (OUTPATIENT)
Dept: PULMONOLOGY | Facility: CLINIC | Age: 63
End: 2023-05-23
Payer: COMMERCIAL

## 2023-05-23 NOTE — TELEPHONE ENCOUNTER
3 voicemails were left for this pt to schedule a f/up with Dr. Covarrubias. He has not returned any calls. Calls made on 4/10, 4/20, 5/8. Letter sent.

## 2023-05-24 ENCOUNTER — MYC MEDICAL ADVICE (OUTPATIENT)
Dept: PULMONOLOGY | Facility: CLINIC | Age: 63
End: 2023-05-24
Payer: COMMERCIAL

## 2023-07-06 ENCOUNTER — TELEPHONE (OUTPATIENT)
Dept: FAMILY MEDICINE | Facility: CLINIC | Age: 63
End: 2023-07-06
Payer: COMMERCIAL

## 2023-07-06 NOTE — TELEPHONE ENCOUNTER
Patient Quality Outreach    Patient is due for the following:   Colon Cancer Screening  Lung cancer screening        Topic Date Due     Pneumococcal Vaccine (1 - PCV) Never done     Diptheria Tetanus Pertussis (DTAP/TDAP/TD) Vaccine (2 - Td or Tdap) 02/02/2015     Zoster (Shingles) Vaccine (2 of 2) 01/02/2023       Next Steps:   Schedule a nurse only visit for immunizations.    Type of outreach:    Sent [employer] message.      Questions for provider review:    None           HIWOT WOO MA

## 2023-08-24 DIAGNOSIS — I48.21 PERMANENT ATRIAL FIBRILLATION (H): ICD-10-CM

## 2023-08-28 NOTE — TELEPHONE ENCOUNTER
ELIQUIS 5 MG TABLET       Last Written Prescription Date:  3-9-23  Last Fill Quantity: 180,   # refills: 1  Last Office Visit : 3-6-23  Future Office visit:  none    Platelet Count   Date Value Ref Range Status   11/24/2021 138 (L) 150 - 450 10e3/uL Final   01/22/2021 137 (L) 150 - 450 10e9/L Final          Also on current med list:   rivaroxaban ANTICOAGULANT (XARELTO) 20 MG TABS tablet   30 tablet 0 5/8/2023  --  Sig - Route: Take 1 tablet (20 mg) by mouth daily (with dinner) - Oral        Routing refill request to provider for review/approval because:  Overdue PLT

## 2023-08-30 RX ORDER — APIXABAN 5 MG/1
5 TABLET, FILM COATED ORAL 2 TIMES DAILY
Qty: 180 TABLET | Refills: 3 | Status: SHIPPED | OUTPATIENT
Start: 2023-08-30 | End: 2024-04-03

## 2023-08-30 NOTE — TELEPHONE ENCOUNTER
Signed Prescriptions:                        Disp   Refills    apixaban ANTICOAGULANT (ELIQUIS ANTICOAGUL*180 ta*3        Sig: TAKE 1 TABLET BY MOUTH TWICE A DAY  Authorizing Provider: GIORGI QUILES  Ordering User: BERONICA MONTOYA    Labs ordered    Beronica Montoya RN  Cardiology Care Coordinator  [facility]  [phone] option 1  Last Comprehensive Metabolic Panel:  Lab Results   Component Value Date     02/22/2023    POTASSIUM 4.5 02/22/2023    CHLORIDE 105 02/22/2023    CO2 29 02/22/2023    ANIONGAP 5 02/22/2023     (H) 02/22/2023    BUN 11 02/22/2023    CR 0.90 02/22/2023    GFRESTIMATED >90 02/22/2023    CHALINO 9.7 02/22/2023

## 2023-09-25 ENCOUNTER — MYC MEDICAL ADVICE (OUTPATIENT)
Dept: FAMILY MEDICINE | Facility: CLINIC | Age: 63
End: 2023-09-25
Payer: COMMERCIAL

## 2023-09-25 DIAGNOSIS — N52.9 ERECTILE DYSFUNCTION, UNSPECIFIED ERECTILE DYSFUNCTION TYPE: ICD-10-CM

## 2023-09-25 RX ORDER — SILDENAFIL 100 MG/1
100 TABLET, FILM COATED ORAL DAILY PRN
Qty: 120 TABLET | Refills: 1 | Status: SHIPPED | OUTPATIENT
Start: 2023-09-25

## 2023-09-26 ENCOUNTER — LAB (OUTPATIENT)
Dept: LAB | Facility: CLINIC | Age: 63
End: 2023-09-26
Payer: COMMERCIAL

## 2023-09-26 ENCOUNTER — ANCILLARY PROCEDURE (OUTPATIENT)
Dept: CT IMAGING | Facility: CLINIC | Age: 63
End: 2023-09-26
Attending: INTERNAL MEDICINE
Payer: COMMERCIAL

## 2023-09-26 DIAGNOSIS — R91.8 PULMONARY NODULES: ICD-10-CM

## 2023-09-26 DIAGNOSIS — I48.21 PERMANENT ATRIAL FIBRILLATION (H): ICD-10-CM

## 2023-09-26 LAB
ANION GAP SERPL CALCULATED.3IONS-SCNC: 8 MMOL/L (ref 7–15)
BUN SERPL-MCNC: 13 MG/DL (ref 8–23)
CALCIUM SERPL-MCNC: 9.4 MG/DL (ref 8.8–10.2)
CHLORIDE SERPL-SCNC: 103 MMOL/L (ref 98–107)
CREAT SERPL-MCNC: 1.04 MG/DL (ref 0.67–1.17)
DEPRECATED HCO3 PLAS-SCNC: 25 MMOL/L (ref 22–29)
EGFRCR SERPLBLD CKD-EPI 2021: 82 ML/MIN/1.73M2
ERYTHROCYTE [DISTWIDTH] IN BLOOD BY AUTOMATED COUNT: 12.9 % (ref 10–15)
GLUCOSE SERPL-MCNC: 129 MG/DL (ref 70–99)
HCT VFR BLD AUTO: 43.3 % (ref 40–53)
HGB BLD-MCNC: 15.7 G/DL (ref 13.3–17.7)
MCH RBC QN AUTO: 33.4 PG (ref 26.5–33)
MCHC RBC AUTO-ENTMCNC: 36.3 G/DL (ref 31.5–36.5)
MCV RBC AUTO: 92 FL (ref 78–100)
PLATELET # BLD AUTO: 143 10E3/UL (ref 150–450)
POTASSIUM SERPL-SCNC: 5 MMOL/L (ref 3.4–5.3)
RBC # BLD AUTO: 4.7 10E6/UL (ref 4.4–5.9)
SODIUM SERPL-SCNC: 136 MMOL/L (ref 136–145)
WBC # BLD AUTO: 6.4 10E3/UL (ref 4–11)

## 2023-09-26 PROCEDURE — 85027 COMPLETE CBC AUTOMATED: CPT

## 2023-09-26 PROCEDURE — 36415 COLL VENOUS BLD VENIPUNCTURE: CPT

## 2023-09-26 PROCEDURE — 80048 BASIC METABOLIC PNL TOTAL CA: CPT

## 2023-09-26 PROCEDURE — 71250 CT THORAX DX C-: CPT | Performed by: RADIOLOGY

## 2023-10-30 ENCOUNTER — OFFICE VISIT (OUTPATIENT)
Dept: CARDIOLOGY | Facility: CLINIC | Age: 63
End: 2023-10-30
Attending: INTERNAL MEDICINE
Payer: COMMERCIAL

## 2023-10-30 VITALS
DIASTOLIC BLOOD PRESSURE: 94 MMHG | BODY MASS INDEX: 33.77 KG/M2 | OXYGEN SATURATION: 100 % | WEIGHT: 249 LBS | HEART RATE: 70 BPM | SYSTOLIC BLOOD PRESSURE: 143 MMHG

## 2023-10-30 DIAGNOSIS — I48.21 PERMANENT ATRIAL FIBRILLATION (H): Primary | ICD-10-CM

## 2023-10-30 PROCEDURE — 99213 OFFICE O/P EST LOW 20 MIN: CPT | Performed by: INTERNAL MEDICINE

## 2023-10-30 NOTE — PATIENT INSTRUCTIONS
Thank you for coming to the [facility]; please note the following instructions:    1. Cardiology Providers: Dr. Vanessa Clement would like you to return for a cardiac follow up in 1 year  (October) and would like to see you in person.  We will contact you regarding your appointment when the time draws closer or you may call [phone] option #1 to arrange an appointment.  Mean while, if you should have any questions or concerns regarding your heart health, please contact us.  Thank you for choosing [facility] for your care.      If you have any questions regarding your visit please contact your care team:     Cardiology  Telephone Number   Beronica JORDAN., RN  Dora GONZALEZ, RN  Lyric PARRISH, RMKATIA SOLIS, MAYRA CASTREJON, Visit Facilitator  Reema HSIEH [facility] [phone] (option 1)   For scheduling appts:     [phone] (select option 1)       For the Device Clinic (Pacemakers and ICD's)  RN's :  Arleth Scanlon   During business hours: [phone]    *After business hours:  [phone] (select option 4)      Normal test result notifications will be released via evly or mailed within 7 business days.  All other test results, will be communicated via telephone once reviewed by your cardiologist.    If you need a medication refill please contact your pharmacy.  Please allow 3 business days for your refill to be completed.    As always, thank you for trusting us with your health care needs!

## 2023-10-30 NOTE — LETTER
10/30/2023      RE: Augusto Meeks  [address] [facility] [address]       Dear Colleague,    Thank you for the opportunity to participate in the care of your patient, Augusto Meeks, at the [facility]. Please see a copy of my visit note below.    HPI: Purpose of visit: Patient comes back for follow-up of atrial fibrillation.    Mr. Augusto Meeks is a 62-year-old gentleman with a medical history significant for hypertension, obesity and permanent atrial fibrillation. The patient does not have any history of diabetes, coronary artery disease, previous strokes, TIAs or heart failure.      Patient's last visit with me was in March 2023.  Patient underwent a Lexiscan nuclear stress test because of dyspnea on exertion. The stress test did not show any evidence of myocardial ischemia. In the last 7 months, patient has been doing well from an atrial fibrillation standpoint.  He did not report any symptoms of irregular heartbeat sensation or palpitations.  However he did report dyspnea on exertion in climbing a few flights of stairs.  He did not report exertional angina, frequent lightheadedness, presyncope or syncope.       PAST MEDICAL HISTORY:  Past Medical History:   Diagnosis Date     A-fib (H) 2019     Antiplatelet or antithrombotic long-term use      Arrhythmia      Hypertension 1/20     Irregular heart beat      Sleep apnea        CURRENT MEDICATIONS:  Current Outpatient Medications   Medication Sig Dispense Refill     amLODIPine (NORVASC) 5 MG tablet Take 1 tablet (5 mg) by mouth daily 90 tablet 1     apixaban ANTICOAGULANT (ELIQUIS ANTICOAGULANT) 5 MG tablet TAKE 1 TABLET BY MOUTH TWICE A  tablet 3     metoprolol succinate ER (TOPROL XL) 50 MG 24 hr tablet Take 1.5 tablets (75 mg) by mouth daily 135 tablet 2     sildenafil (VIAGRA) 100 MG tablet Take 1 tablet (100 mg) by mouth daily as needed 120 tablet 1        PAST SURGICAL HISTORY:  Past Surgical History:   Procedure Laterality Date     ANESTHESIA CARDIOVERSION N/A 9/25/2019    Procedure: Anesthesia Coverage Transesophageal Echocardiogram, Cardioversion @0930;  Surgeon: GENERIC ANESTHESIA PROVIDER;  [facility]     ANESTHESIA CARDIOVERSION N/A 10/30/2019    Procedure: ANESTHESIA, FOR CARDIOVERSION @1100;  Surgeon: GENERIC ANESTHESIA PROVIDER;  Location:  OR     ANESTHESIA CARDIOVERSION N/A 12/5/2019    Procedure: CARDIOVERSION;  Surgeon: GENERIC ANESTHESIA PROVIDER;  [facility]     ANESTHESIA CARDIOVERSION N/A 5/21/2021    Procedure: ANESTHESIA, FOR CARDIOVERSION @1330;  Surgeon: GENERIC ANESTHESIA PROVIDER;  Location:  OR     CARDIAC SURGERY  2/22/21    Ablaution     CARDIOVERSION  10/30/2019    Patient reported he had a cardioverson on 09/25/2019.     EP ABLATION FOCAL AFIB N/A 1/22/2021    Procedure: EP ABLATION FOCAL AFIB;  Surgeon: Vanessa Clement MD;  Location:  HEART CARDIAC CATH LAB     EP ABLATION FOCAL AFIB N/A 11/24/2021    Procedure: EP ABLATION FOCAL AFIB W/INTRA OP DOT;  Surgeon: Prashanth Polanco MD;  Location:  HEART CARDIAC CATH LAB     VASECTOMY         ALLERGIES:     Allergies   Allergen Reactions     Lisinopril Cough       FAMILY HISTORY:  - Premature coronary artery disease  - Atrial fibrillation  - Sudden cardiac death     SOCIAL HISTORY:  Social History     Tobacco Use     Smoking status: Former     Types: Cigars     Smokeless tobacco: Never     Tobacco comments:     occasional cigar   Vaping Use     Vaping Use: Never used   Substance Use Topics     Alcohol use: Yes     Comment: occas      Drug use: Yes     Types: Marijuana     Comment: occas        ROS:   Constitutional: No fever, chills, or sweats. Weight stable.   ENT: No visual disturbance, ear ache, epistaxis, sore throat.   Cardiovascular: As per HPI.   Respiratory: No cough, hemoptysis.    GI: No nausea, vomiting, hematemesis, melena, or hematochezia.   : No hematuria.    Integument: Negative.   Psychiatric: Negative.   Hematologic:  Easy bruising, no easy bleeding.  Neuro: Negative.   Endocrinology: No significant heat or cold intolerance   Musculoskeletal: No myalgia.    Exam:  BP (!) 143/94 (BP Location: Left arm, Patient Position: Sitting, Cuff Size: Adult Large)   Pulse 70   Wt 112.9 kg (249 lb)   SpO2 100%   BMI 33.77 kg/m    GENERAL APPEARANCE: healthy, alert and no distress  HEENT: no icterus, no xanthelasmas, normal pupil size and reaction, normal palate, mucosa moist, no central cyanosis  NECK: no adenopathy, no asymmetry, masses, or scars, thyroid normal to palpation and no bruits, JVP not elevated  RESPIRATORY: lungs clear to auscultation - no rales, rhonchi or wheezes, no use of accessory muscles, no retractions, respirations are unlabored, normal respiratory rate  CARDIOVASCULAR: irregular rhythm, normal S1 with physiologic split S2, no S3 or S4 and no murmur, click or rub, precordium quiet with normal PMI.  ABDOMEN: soft, non tender, without hepatosplenomegaly, no masses palpable, bowel sounds normal, aorta not enlarged by palpation, no abdominal bruits  EXTREMITIES: peripheral pulses normal, no edema, no bruits  NEURO: alert and oriented to person/place/time, normal speech, gait and affect  VASC: Radial, femoral, dorsalis pedis and posterior tibialis pulses are normal in volumes and symmetric bilaterally. No bruits are heard.  SKIN: no ecchymoses, no rashes    Labs:  CBC RESULTS:   Lab Results   Component Value Date    WBC 6.4 09/26/2023    WBC 5.3 01/22/2021    RBC 4.70 09/26/2023    RBC 3.88 (L) 01/22/2021    HGB 15.7 09/26/2023    HGB 12.9 (L) 01/22/2021    HCT 43.3 09/26/2023    HCT 37.3 (L) 01/22/2021    MCV 92 09/26/2023    MCV 96 01/22/2021    MCH 33.4 (H) 09/26/2023    MCH 33.2 (H) 01/22/2021    MCHC 36.3 09/26/2023    MCHC 34.6 01/22/2021    RDW 12.9 09/26/2023    RDW 14.9 01/22/2021     (L) 09/26/2023     (L) 01/22/2021       BMP  RESULTS:  Lab Results   Component Value Date     09/26/2023     01/22/2021    POTASSIUM 5.0 09/26/2023    POTASSIUM 4.5 02/22/2023    POTASSIUM 4.3 05/21/2021    CHLORIDE 103 09/26/2023    CHLORIDE 105 02/22/2023    CHLORIDE 108 01/22/2021    CO2 25 09/26/2023    CO2 29 02/22/2023    CO2 25 01/22/2021    ANIONGAP 8 09/26/2023    ANIONGAP 5 02/22/2023    ANIONGAP 6 01/22/2021     (H) 09/26/2023     (H) 02/22/2023     (H) 01/22/2021    BUN 13.0 09/26/2023    BUN 11 02/22/2023    BUN 13 01/22/2021    CR 1.04 09/26/2023    CR 0.82 01/22/2021    GFRESTIMATED 82 09/26/2023    GFRESTIMATED >90 01/22/2021    GFRESTBLACK >90 01/22/2021    CHALINO 9.4 09/26/2023    CHALINO 8.7 01/22/2021        INR RESULTS:  Lab Results   Component Value Date    INR 1.26 (H) 11/24/2021    INR 1.31 (H) 11/19/2021    INR 1.39 (H) 09/25/2019       Procedures:      Assessment and Plan:     Permanent atrial fibrillation-ventricular rate well controlled by metoprolol XL 75 mg once daily    It is encouraging that patient's ventricular rate is well controlled by Toprol-XL 75 mg once daily.  We will continue current medications and see patient in follow-up in 1 year.  All questions and concerns were addressed and patient and his wife were happy with the plan.    CC  Patient Care Team:  Singh Valencia MD as PCP - General (Family Medicine)  Giorgi Quiles MD as Assigned Heart and Vascular Provider  Hiram Covarrubias MD as MD (Critical Care)  Giorgi Quiles MD as Referring Physician (Clinical Cardiac Electrophysiology)  Singh Valencia MD as Assigned PCP  Hiram Covarrubias MD as Assigned Pulmonology Provider  GIORGI QUILES      Please do not hesitate to contact me if you have any questions/concerns.     Sincerely,     Giorgi Quiles MD

## 2023-10-31 ENCOUNTER — VIRTUAL VISIT (OUTPATIENT)
Dept: PULMONOLOGY | Facility: CLINIC | Age: 63
End: 2023-10-31
Attending: INTERNAL MEDICINE
Payer: COMMERCIAL

## 2023-10-31 VITALS — HEIGHT: 71 IN | WEIGHT: 240 LBS | BODY MASS INDEX: 33.6 KG/M2

## 2023-10-31 DIAGNOSIS — I10 BENIGN ESSENTIAL HYPERTENSION: Primary | ICD-10-CM

## 2023-10-31 DIAGNOSIS — F17.211 CIGARETTE NICOTINE DEPENDENCE IN REMISSION: Primary | ICD-10-CM

## 2023-10-31 PROCEDURE — 99215 OFFICE O/P EST HI 40 MIN: CPT | Mod: VID | Performed by: INTERNAL MEDICINE

## 2023-10-31 ASSESSMENT — PAIN SCALES - GENERAL: PAINLEVEL: NO PAIN (0)

## 2023-10-31 NOTE — PROGRESS NOTES
"LUNG NODULE & INTERVENTIONAL PULMONARY CLINIC  CLINICS & SURGERY CENTER, [facility], [facility]   VIDEO VISIT    Augusto Meeks MRN# [id number]   Age: 62 year old YOB: 1960     Reason for Consultation: Lung Nodule    Requesting Physician: Karli Yo MD  No address on file       The patient has been notified of following:   \"This video visit will be conducted via a call between you and your physician/provider. We have found that certain health care needs can be provided without the need for an in-person physical exam.  This service lets us provide the care you need with a video conversation.  If a prescription is necessary we can send it directly to your pharmacy.  If lab work is needed we can place an order for that and you can then stop by our lab to have the test done at a later time.  Video visits are billed at different rates depending on your insurance coverage.  Please reach out to your insurance provider with any questions.  If during the course of the call the physician/provider feels a video visit is not appropriate, you will not be charged for this service.\"  Patient has given verbal consent for Video visit? Yes  How would you like to obtain your AVS? Please refer to rooming staff note  Patient would like the video invitation sent by: Please refer to rooming staff note   Will anyone else be joining your video visit? Please refer to rooming staff note       Video-Visit Details     Type of service:  Video Visit  Video Start Time: 353  Video End Time: 410  Provider Name: Hiram Covarrubias MD, MHA   Originating Location (pt. Location): Home  Provider Location: Off campus   Distant Location (provider location): Home/Clinic  Platform used for Video Visit: AmWell/Doximity    Assessment and Plan:    1. LCS and h/o previously stable nodules. No new interval changes. Cont annual LDCT    Billing: I spent a total of 45min spent on date of encounter which includes prep time, " visit with the patient and post visit work including documentation and nursing communication     Hiram Covarrubias MD, MHA  Associate Professor of Medicine  Section of Interventional Pulmonology   Division of Pulmonary, Allergy, Critical Care and Sleep Medicine   [facility]  Pager: [phone]   Office: [phone]  Email: [email or web address]    Sheri Whalen RN   Interventional Pulmonary Care Coordinator   Office: [phone]  Email: [email or web address]     Oskar Camejo  Interventional Pulmonary Surgery Scheduler   Office: [phone]  Email: [email or web address]         History:     Augusto Meeks is a 60 year old male with sig h/o for afib, GABRIEL who is here for evaluation/followup of lung nodule(s).     - No new resp sx or complaints. Denies dyspnea or cough.   - here for eval of nodules  - Personal hx of cancer: na  - Family hx of cancer: na  - Exposure hx: Denies asbestos or radon exposure   - Tobacco hx: Past Smoker: 1ppd for 30years. Quit 10yrs ago. Has an occasional cigar.  - My interpretation of the images relevant for this visit includes: ggo   - My interpretation of the PFT's relevant for this visit includes: None      Culprit Nodule(s):   1. sub6mm ggo bilaterally. No interval change in ggo's.      Other active medical problems include:   - has afib. Stable on BB and anticoagulation   - has GABRIEL. On CPAP           Past Medical History:      Past Medical History:   Diagnosis Date    A-fib (H) 2019    Antiplatelet or antithrombotic long-term use     Arrhythmia     Hypertension 1/20    Irregular heart beat     Sleep apnea              Past Surgical History:      Past Surgical History:   Procedure Laterality Date    ANESTHESIA CARDIOVERSION N/A 9/25/2019    Procedure: Anesthesia Coverage Transesophageal Echocardiogram, Cardioversion @0930;  Surgeon: GENERIC ANESTHESIA PROVIDER;  [facility]    ANESTHESIA CARDIOVERSION N/A 10/30/2019    Procedure:  ANESTHESIA, FOR CARDIOVERSION @1100;  Surgeon: GENERIC ANESTHESIA PROVIDER;  [facility]    ANESTHESIA CARDIOVERSION N/A 12/5/2019    Procedure: CARDIOVERSION;  Surgeon: GENERIC ANESTHESIA PROVIDER;  [facility]    ANESTHESIA CARDIOVERSION N/A 5/21/2021    Procedure: ANESTHESIA, FOR CARDIOVERSION @1330;  Surgeon: GENERIC ANESTHESIA PROVIDER;  Location:  OR    CARDIAC SURGERY  2/22/21    Ablaution    CARDIOVERSION  10/30/2019    Patient reported he had a cardioverson on 09/25/2019.    EP ABLATION FOCAL AFIB N/A 1/22/2021    Procedure: EP ABLATION FOCAL AFIB;  Surgeon: Vanessa Clement MD;  Location:  HEART CARDIAC CATH LAB    EP ABLATION FOCAL AFIB N/A 11/24/2021    Procedure: EP ABLATION FOCAL AFIB W/INTRA OP DOT;  Surgeon: Prashanth Polanco MD;  Location:  HEART CARDIAC CATH LAB    VASECTOMY              Social History:     Social History     Tobacco Use    Smoking status: Former     Types: Cigars    Smokeless tobacco: Never    Tobacco comments:     occasional cigar   Substance Use Topics    Alcohol use: Yes     Comment: occas             Family History:     Family History   Problem Relation Age of Onset    Lung Cancer Mother     Cancer Father     Prostate Cancer Father              Allergies:      Allergies   Allergen Reactions    Lisinopril Cough            Medications:     Current Outpatient Medications   Medication Sig    amLODIPine (NORVASC) 5 MG tablet Take 1 tablet (5 mg) by mouth daily    apixaban ANTICOAGULANT (ELIQUIS ANTICOAGULANT) 5 MG tablet TAKE 1 TABLET BY MOUTH TWICE A DAY    metoprolol succinate ER (TOPROL XL) 50 MG 24 hr tablet Take 1.5 tablets (75 mg) by mouth daily    sildenafil (VIAGRA) 100 MG tablet Take 1 tablet (100 mg) by mouth daily as needed     No current facility-administered medications for this visit.            Review of Systems:     12-point ROS reviewed and abnormalities stated in the history.         Physical Exam:     Constitutional - looks well, in no apparent  distress  Eyes - no redness or discharge  Respiratory -breathing appears comfortable.  No cough.  Skin - No appreciable discoloration or lesions (very limited exam)  Neurological - No apparent tremors. Speech fluent and articlate  Psychiatric - no signs of delirium or anxiety     Exam limited to that easily identified on a virtual visit. The rest of a comprehensive physical examination is deferred due to PHE (public health emergency) video visit restrictions.         Current Laboratory Data:   All laboratory and imaging data reviewed.           No data to display

## 2023-10-31 NOTE — NURSING NOTE
No other vitals to report today, taken yest. at appt/in chart per pt       Is the patient currently in the state of [place]? YES    Visit mode:VIDEO    If the visit is dropped, the patient can be reconnected by: VIDEO VISIT: Text to cell phone:   Telephone Information:   Mobile [phone]       Will anyone else be joining the visit? NO  (If patient encounters technical issues they should call [phone]    How would you like to obtain your AVS? MyChart    Are changes needed to the allergy or medication list? Pt declined allergy review and Pt declined med review    Patient declined individual allergy and medication review by support staff because patient denies any changes since echeck-in completion and states all information entered during echeck-in remains accurate.    Reason for visit: RECHDARINEL Mackenzie MA VVF

## 2023-10-31 NOTE — LETTER
"10/31/2023       RE: Augusto Meeks  [address] [place] [facility] [address]     Dear Colleague,    Thank you for referring your patient, [facility]ks[facility]. Please see a copy of my visit note below.    Virtual Visit Details    Type of service:  Video Visit     See note    LUNG NODULE & INTERVENTIONAL PULMONARY CLINIC  [facility] [place], [facility]   VIDEO VISIT    Augusto Meeks MRN# [id number]   Age: 62 year old YOB: 1960     Reason for Consultation: Lung Nodule    Requesting Physician: Referred Self, MD  No address on file       The patient has been notified of following:   \"This video visit will be conducted via a call between you and your physician/provider. We have found that certain health care needs can be provided without the need for an in-person physical exam.  This service lets us provide the care you need with a video conversation.  If a prescription is necessary we can send it directly to your pharmacy.  If lab work is needed we can place an order for that and you can then stop by our lab to have the test done at a later time.  Video visits are billed at different rates depending on your insurance coverage.  Please reach out to your insurance provider with any questions.  If during the course of the call the physician/provider feels a video visit is not appropriate, you will not be charged for this service.\"  Patient has given verbal consent for Video visit? Yes  How would you like to obtain your AVS? Please refer to rooming staff note  Patient would like the video invitation sent by: Please refer to rooming staff note   Will anyone else be joining your video visit? Please refer to rooming staff note       Video-Visit Details     Type of service:  Video Visit  Video Start Time: 353  Video End Time: 410  Provider Name: Hiram Covarrubias MD, " CORI   Originating Location (pt. Location): Home  Provider Location: Off campus   Distant Location (provider location): Home/Clinic  Platform used for Video Visit: AmWell/Doximity    Assessment and Plan:    1. LCS and h/o previously stable nodules. No new interval changes. Cont annual LDCT    Billing: I spent a total of 45min spent on date of encounter which includes prep time, visit with the patient and post visit work including documentation and nursing communication     Hiram Covarrubias MD, NORMA  Associate Professor of Medicine  Section of Interventional Pulmonology   Division of Pulmonary, Allergy, Critical Care and Sleep Medicine   [facility]  Pager: [phone]   Office: [phone]  Email: [email or web address]    Sheri Whalen RN   Interventional Pulmonary Care Coordinator   Office: [phone]  Email: [email or web address]     Oskar Camejo  Interventional Pulmonary Surgery Scheduler   Office: [phone]  Email: [email or web address]         History:     Augusto Meeks is a 60 year old male with sig h/o for afib, GABRIEL who is here for evaluation/followup of lung nodule(s).     - No new resp sx or complaints. Denies dyspnea or cough.   - here for eval of nodules  - Personal hx of cancer: na  - Family hx of cancer: na  - Exposure hx: Denies asbestos or radon exposure   - Tobacco hx: Past Smoker: 1ppd for 30years. Quit 10yrs ago. Has an occasional cigar.  - My interpretation of the images relevant for this visit includes: ggo   - My interpretation of the PFT's relevant for this visit includes: None      Culprit Nodule(s):   1. sub6mm ggo bilaterally. No interval change in ggo's.      Other active medical problems include:   - has afib. Stable on BB and anticoagulation   - has GABRIEL. On CPAP           Past Medical History:      Past Medical History:   Diagnosis Date    A-fib (H) 2019    Antiplatelet or antithrombotic long-term use     Arrhythmia     Hypertension 1/20     Irregular heart beat     Sleep apnea              Past Surgical History:      Past Surgical History:   Procedure Laterality Date    ANESTHESIA CARDIOVERSION N/A 9/25/2019    Procedure: Anesthesia Coverage Transesophageal Echocardiogram, Cardioversion @0930;  Surgeon: GENERIC ANESTHESIA PROVIDER;  [facility]    ANESTHESIA CARDIOVERSION N/A 10/30/2019    Procedure: ANESTHESIA, FOR CARDIOVERSION @1100;  Surgeon: GENERIC ANESTHESIA PROVIDER;  [facility]    ANESTHESIA CARDIOVERSION N/A 12/5/2019    Procedure: CARDIOVERSION;  Surgeon: GENERIC ANESTHESIA PROVIDER;  [facility]    ANESTHESIA CARDIOVERSION N/A 5/21/2021    Procedure: ANESTHESIA, FOR CARDIOVERSION @1330;  Surgeon: GENERIC ANESTHESIA PROVIDER;  Location:  OR    CARDIAC SURGERY  2/22/21    Ablaution    CARDIOVERSION  10/30/2019    Patient reported he had a cardioverson on 09/25/2019.    EP ABLATION FOCAL AFIB N/A 1/22/2021    Procedure: EP ABLATION FOCAL AFIB;  Surgeon: Vanessa Clement MD;  Location:  HEART CARDIAC CATH LAB    EP ABLATION FOCAL AFIB N/A 11/24/2021    Procedure: EP ABLATION FOCAL AFIB W/INTRA OP DOT;  Surgeon: Prashanth Polanco MD;  Location:  HEART CARDIAC CATH LAB    VASECTOMY              Social History:     Social History     Tobacco Use    Smoking status: Former     Types: Cigars    Smokeless tobacco: Never    Tobacco comments:     occasional cigar   Substance Use Topics    Alcohol use: Yes     Comment: occas             Family History:     Family History   Problem Relation Age of Onset    Lung Cancer Mother     Cancer Father     Prostate Cancer Father              Allergies:      Allergies   Allergen Reactions    Lisinopril Cough            Medications:     Current Outpatient Medications   Medication Sig    amLODIPine (NORVASC) 5 MG tablet Take 1 tablet (5 mg) by mouth daily    apixaban ANTICOAGULANT (ELIQUIS ANTICOAGULANT) 5 MG tablet TAKE 1 TABLET BY MOUTH TWICE A DAY    metoprolol succinate ER (TOPROL XL) 50 MG 24 hr  tablet Take 1.5 tablets (75 mg) by mouth daily    sildenafil (VIAGRA) 100 MG tablet Take 1 tablet (100 mg) by mouth daily as needed     No current facility-administered medications for this visit.            Review of Systems:     12-point ROS reviewed and abnormalities stated in the history.         Physical Exam:     Constitutional - looks well, in no apparent distress  Eyes - no redness or discharge  Respiratory -breathing appears comfortable.  No cough.  Skin - No appreciable discoloration or lesions (very limited exam)  Neurological - No apparent tremors. Speech fluent and articlate  Psychiatric - no signs of delirium or anxiety     Exam limited to that easily identified on a virtual visit. The rest of a comprehensive physical examination is deferred due to PHE (public health emergency) video visit restrictions.         Current Laboratory Data:   All laboratory and imaging data reviewed.           No data to display

## 2023-10-31 NOTE — PROGRESS NOTES
HPI: Purpose of visit: Patient comes back for follow-up of atrial fibrillation.    Mr. Augusto Meeks is a 62-year-old gentleman with a medical history significant for hypertension, obesity and permanent atrial fibrillation. The patient does not have any history of diabetes, coronary artery disease, previous strokes, TIAs or heart failure.      Patient's last visit with me was in March 2023.  Patient underwent a Lexiscan nuclear stress test because of dyspnea on exertion. The stress test did not show any evidence of myocardial ischemia. In the last 7 months, patient has been doing well from an atrial fibrillation standpoint.  He did not report any symptoms of irregular heartbeat sensation or palpitations.  However he did report dyspnea on exertion in climbing a few flights of stairs.  He did not report exertional angina, frequent lightheadedness, presyncope or syncope.       PAST MEDICAL HISTORY:  Past Medical History:   Diagnosis Date     A-fib (H) 2019     Antiplatelet or antithrombotic long-term use      Arrhythmia      Hypertension 1/20     Irregular heart beat      Sleep apnea        CURRENT MEDICATIONS:  Current Outpatient Medications   Medication Sig Dispense Refill     amLODIPine (NORVASC) 5 MG tablet Take 1 tablet (5 mg) by mouth daily 90 tablet 1     apixaban ANTICOAGULANT (ELIQUIS ANTICOAGULANT) 5 MG tablet TAKE 1 TABLET BY MOUTH TWICE A  tablet 3     metoprolol succinate ER (TOPROL XL) 50 MG 24 hr tablet Take 1.5 tablets (75 mg) by mouth daily 135 tablet 2     sildenafil (VIAGRA) 100 MG tablet Take 1 tablet (100 mg) by mouth daily as needed 120 tablet 1       PAST SURGICAL HISTORY:  Past Surgical History:   Procedure Laterality Date     ANESTHESIA CARDIOVERSION N/A 9/25/2019    Procedure: Anesthesia Coverage Transesophageal Echocardiogram, Cardioversion @0930;  Surgeon: GENERIC ANESTHESIA PROVIDER;  [facility]     ANESTHESIA CARDIOVERSION N/A 10/30/2019    Procedure: ANESTHESIA, FOR  CARDIOVERSION @1100;  Surgeon: GENERIC ANESTHESIA PROVIDER;  [facility]     ANESTHESIA CARDIOVERSION N/A 12/5/2019    Procedure: CARDIOVERSION;  Surgeon: GENERIC ANESTHESIA PROVIDER;  [facility]     ANESTHESIA CARDIOVERSION N/A 5/21/2021    Procedure: ANESTHESIA, FOR CARDIOVERSION @1330;  Surgeon: GENERIC ANESTHESIA PROVIDER;  Location:  OR     CARDIAC SURGERY  2/22/21    Ablaution     CARDIOVERSION  10/30/2019    Patient reported he had a cardioverson on 09/25/2019.     EP ABLATION FOCAL AFIB N/A 1/22/2021    Procedure: EP ABLATION FOCAL AFIB;  Surgeon: Vanessa Clement MD;  Location:  HEART CARDIAC CATH LAB     EP ABLATION FOCAL AFIB N/A 11/24/2021    Procedure: EP ABLATION FOCAL AFIB W/INTRA OP DOT;  Surgeon: Prashanth Polanco MD;  Location:  HEART CARDIAC CATH LAB     VASECTOMY         ALLERGIES:     Allergies   Allergen Reactions     Lisinopril Cough       FAMILY HISTORY:  - Premature coronary artery disease  - Atrial fibrillation  - Sudden cardiac death     SOCIAL HISTORY:  Social History     Tobacco Use     Smoking status: Former     Types: Cigars     Smokeless tobacco: Never     Tobacco comments:     occasional cigar   Vaping Use     Vaping Use: Never used   Substance Use Topics     Alcohol use: Yes     Comment: occas      Drug use: Yes     Types: Marijuana     Comment: occas        ROS:   Constitutional: No fever, chills, or sweats. Weight stable.   ENT: No visual disturbance, ear ache, epistaxis, sore throat.   Cardiovascular: As per HPI.   Respiratory: No cough, hemoptysis.    GI: No nausea, vomiting, hematemesis, melena, or hematochezia.   : No hematuria.   Integument: Negative.   Psychiatric: Negative.   Hematologic:  Easy bruising, no easy bleeding.  Neuro: Negative.   Endocrinology: No significant heat or cold intolerance   Musculoskeletal: No myalgia.    Exam:  BP (!) 143/94 (BP Location: Left arm, Patient Position: Sitting, Cuff Size: Adult Large)   Pulse 70   Wt 112.9 kg (249 lb)    SpO2 100%   BMI 33.77 kg/m    GENERAL APPEARANCE: healthy, alert and no distress  HEENT: no icterus, no xanthelasmas, normal pupil size and reaction, normal palate, mucosa moist, no central cyanosis  NECK: no adenopathy, no asymmetry, masses, or scars, thyroid normal to palpation and no bruits, JVP not elevated  RESPIRATORY: lungs clear to auscultation - no rales, rhonchi or wheezes, no use of accessory muscles, no retractions, respirations are unlabored, normal respiratory rate  CARDIOVASCULAR: irregular rhythm, normal S1 with physiologic split S2, no S3 or S4 and no murmur, click or rub, precordium quiet with normal PMI.  ABDOMEN: soft, non tender, without hepatosplenomegaly, no masses palpable, bowel sounds normal, aorta not enlarged by palpation, no abdominal bruits  EXTREMITIES: peripheral pulses normal, no edema, no bruits  NEURO: alert and oriented to person/place/time, normal speech, gait and affect  VASC: Radial, femoral, dorsalis pedis and posterior tibialis pulses are normal in volumes and symmetric bilaterally. No bruits are heard.  SKIN: no ecchymoses, no rashes    Labs:  CBC RESULTS:   Lab Results   Component Value Date    WBC 6.4 09/26/2023    WBC 5.3 01/22/2021    RBC 4.70 09/26/2023    RBC 3.88 (L) 01/22/2021    HGB 15.7 09/26/2023    HGB 12.9 (L) 01/22/2021    HCT 43.3 09/26/2023    HCT 37.3 (L) 01/22/2021    MCV 92 09/26/2023    MCV 96 01/22/2021    MCH 33.4 (H) 09/26/2023    MCH 33.2 (H) 01/22/2021    MCHC 36.3 09/26/2023    MCHC 34.6 01/22/2021    RDW 12.9 09/26/2023    RDW 14.9 01/22/2021     (L) 09/26/2023     (L) 01/22/2021       BMP RESULTS:  Lab Results   Component Value Date     09/26/2023     01/22/2021    POTASSIUM 5.0 09/26/2023    POTASSIUM 4.5 02/22/2023    POTASSIUM 4.3 05/21/2021    CHLORIDE 103 09/26/2023    CHLORIDE 105 02/22/2023    CHLORIDE 108 01/22/2021    CO2 25 09/26/2023    CO2 29 02/22/2023    CO2 25 01/22/2021    ANIONGAP 8 09/26/2023     ANIONGAP 5 02/22/2023    ANIONGAP 6 01/22/2021     (H) 09/26/2023     (H) 02/22/2023     (H) 01/22/2021    BUN 13.0 09/26/2023    BUN 11 02/22/2023    BUN 13 01/22/2021    CR 1.04 09/26/2023    CR 0.82 01/22/2021    GFRESTIMATED 82 09/26/2023    GFRESTIMATED >90 01/22/2021    GFRESTBLACK >90 01/22/2021    CHALINO 9.4 09/26/2023    CHALINO 8.7 01/22/2021        INR RESULTS:  Lab Results   Component Value Date    INR 1.26 (H) 11/24/2021    INR 1.31 (H) 11/19/2021    INR 1.39 (H) 09/25/2019       Procedures:      Assessment and Plan:     Permanent atrial fibrillation-ventricular rate well controlled by metoprolol XL 75 mg once daily    It is encouraging that patient's ventricular rate is well controlled by Toprol-XL 75 mg once daily.  We will continue current medications and see patient in follow-up in 1 year.  All questions and concerns were addressed and patient and his wife were happy with the plan.    CC  Patient Care Team:  Singh Valencia MD as PCP - General (Family Medicine)  Giorgi Quiles MD as Assigned Heart and Vascular Provider  Hiram Covarrubias MD as MD (Critical Care)  Giorgi Quiles MD as Referring Physician (Clinical Cardiac Electrophysiology)  Singh Valencia MD as Assigned PCP  Hiram Covarrubias MD as Assigned Pulmonology Provider  GIORGI QUILES

## 2023-11-02 RX ORDER — AMLODIPINE BESYLATE 5 MG/1
5 TABLET ORAL DAILY
Qty: 90 TABLET | Refills: 0 | Status: SHIPPED | OUTPATIENT
Start: 2023-11-02 | End: 2023-11-07

## 2023-11-02 NOTE — TELEPHONE ENCOUNTER
amLODIPine (NORVASC) 5 MG tablet 90 tablet 1 5/22/2023     Last Office Visit: 10/30/23  Future Office visit:   none    BP Readings from Last 3 Encounters:   10/30/23 (!) 143/94   02/22/23 137/76   04/12/22 137/89       Refill protocol passed  FYI sent to clinic for high BP    Danay Looney RN

## 2023-11-07 ENCOUNTER — MYC MEDICAL ADVICE (OUTPATIENT)
Dept: CARDIOLOGY | Facility: CLINIC | Age: 63
End: 2023-11-07
Payer: COMMERCIAL

## 2023-11-07 RX ORDER — AMLODIPINE BESYLATE 5 MG/1
5 TABLET ORAL DAILY
Qty: 90 TABLET | Refills: 3 | Status: SHIPPED | OUTPATIENT
Start: 2023-11-07

## 2023-11-07 NOTE — TELEPHONE ENCOUNTER
Signed Prescriptions:                        Disp   Refills    amLODIPine (NORVASC) 5 MG tablet           90 tab*3        Sig: Take 1 tablet (5 mg) by mouth daily  Authorizing Provider: GIORGI QUILES  Ordering User: BERONICA MONTOYA message sent regarding BP readings.    Beronica Montoya RN  Cardiology Care Coordinator  [facility]  [phone] option 1

## 2024-01-23 ENCOUNTER — PATIENT OUTREACH (OUTPATIENT)
Dept: CARE COORDINATION | Facility: CLINIC | Age: 64
End: 2024-01-23
Payer: COMMERCIAL

## 2024-01-28 DIAGNOSIS — I10 BENIGN ESSENTIAL HYPERTENSION: ICD-10-CM

## 2024-02-01 RX ORDER — METOPROLOL SUCCINATE 50 MG/1
75 TABLET, EXTENDED RELEASE ORAL DAILY
Qty: 135 TABLET | Refills: 0 | Status: SHIPPED | OUTPATIENT
Start: 2024-02-01 | End: 2024-05-03

## 2024-02-01 NOTE — TELEPHONE ENCOUNTER
LCV:10/30/2023  [facility]  BP above protocol, FYI to clinic  RF 90 day    BP Readings from Last 3 Encounters:   10/30/23 (!) 143/94   02/22/23 137/76   04/12/22 137/89

## 2024-02-06 ENCOUNTER — PATIENT OUTREACH (OUTPATIENT)
Dept: CARE COORDINATION | Facility: CLINIC | Age: 64
End: 2024-02-06
Payer: COMMERCIAL

## 2024-02-09 NOTE — TELEPHONE ENCOUNTER
Pt mleani response to ISREAL Loco in November 2023 stated blood pressure in 130's over 80's.     Has upcoming visit with PCP- should recheck at that time    Masha Dockery RN

## 2024-03-17 SDOH — HEALTH STABILITY: PHYSICAL HEALTH: ON AVERAGE, HOW MANY MINUTES DO YOU ENGAGE IN EXERCISE AT THIS LEVEL?: 20 MIN

## 2024-03-17 SDOH — HEALTH STABILITY: PHYSICAL HEALTH: ON AVERAGE, HOW MANY DAYS PER WEEK DO YOU ENGAGE IN MODERATE TO STRENUOUS EXERCISE (LIKE A BRISK WALK)?: 2 DAYS

## 2024-03-17 ASSESSMENT — SOCIAL DETERMINANTS OF HEALTH (SDOH): HOW OFTEN DO YOU GET TOGETHER WITH FRIENDS OR RELATIVES?: MORE THAN THREE TIMES A WEEK

## 2024-03-19 NOTE — PATIENT INSTRUCTIONS
Preventive Care Advice   This is general advice given by our system to help you stay healthy. However, your care team may have specific advice just for you. Please talk to your care team about your preventive care needs.  Nutrition  Eat 5 or more servings of fruits and vegetables each day.  Try wheat bread, brown rice and whole grain pasta (instead of white bread, rice, and pasta).  Get enough calcium and vitamin D. Check the label on foods and aim for 100% of the RDA (recommended daily allowance).  Lifestyle  Exercise at least 150 minutes each week   (30 minutes a day, 5 days a week).  Do muscle strengthening activities 2 days a week. These help control your weight and prevent disease.  No smoking.  Wear sunscreen to prevent skin cancer.  Have a dental exam and cleaning every 6 months.  Yearly exams  See your health care team every year to talk about:  Any changes in your health.  Any medicines your care team has prescribed.  Preventive care, family planning, and ways to prevent chronic diseases.  Shots (vaccines)   HPV shots (up to age 26), if you've never had them before.  Hepatitis B shots (up to age 59), if you've never had them before.  COVID-19 shot: Get this shot when it's due.  Flu shot: Get a flu shot every year.  Tetanus shot: Get a tetanus shot every 10 years.  Pneumococcal, hepatitis A, and RSV shots: Ask your care team if you need these based on your risk.  Shingles shot (for age 50 and up).  General health tests  Diabetes screening:  Starting at age 35, Get screened for diabetes at least every 3 years.  If you are younger than age 35, ask your care team if you should be screened for diabetes.  Cholesterol test: At age 39, start having a cholesterol test every 5 years, or more often if advised.  Bone density scan (DEXA): At age 50, ask your care team if you should have this scan for osteoporosis (brittle bones).  Hepatitis C: Get tested at least once in your life.  STIs (sexually transmitted  infections)  Before age 24: Ask your care team if you should be screened for STIs.  After age 24: Get screened for STIs if you're at risk. You are at risk for STIs (including HIV) if:  You are sexually active with more than one person.  You don't use condoms every time.  You or a partner was diagnosed with a sexually transmitted infection.  If you are at risk for HIV, ask about PrEP medicine to prevent HIV.  Get tested for HIV at least once in your life, whether you are at risk for HIV or not.  Cancer screening tests  Cervical cancer screening: If you have a cervix, begin getting regular cervical cancer screening tests at age 21. Most people who have regular screenings with normal results can stop after age 65. Talk about this with your provider.  Breast cancer scan (mammogram): If you've ever had breasts, begin having regular mammograms starting at age 40. This is a scan to check for breast cancer.  Colon cancer screening: It is important to start screening for colon cancer at age 45.  Have a colonoscopy test every 10 years (or more often if you're at risk) Or, ask your provider about stool tests like a FIT test every year or Cologuard test every 3 years.  To learn more about your testing options, visit: https://www.[employer]/407370.pdf.  For help making a decision, visit: https://bit.ly/md70542.  Prostate cancer screening test: If you have a prostate and are age 55 to 69, ask your provider if you would benefit from a yearly prostate cancer screening test.  Lung cancer screening: If you are a current or former smoker age 50 to 80, ask your care team if ongoing lung cancer screenings are right for you.  For informational purposes only. Not to replace the advice of your health care provider. Copyright   2023 [employer]. All rights reserved. Clinically reviewed by the [facility]. [employer] [id number] - REV 01/24.    Learning About Stress  What is stress?     Stress is your  body's response to a hard situation. Your body can have a physical, emotional, or mental response. Stress is a fact of life for most people, and it affects everyone differently. What causes stress for you may not be stressful for someone else.  A lot of things can cause stress. You may feel stress when you go on a job interview, take a test, or run a race. This kind of short-term stress is normal and even useful. It can help you if you need to work hard or react quickly. For example, stress can help you finish an important job on time.  Long-term stress is caused by ongoing stressful situations or events. Examples of long-term stress include long-term health problems, ongoing problems at work, or conflicts in your family. Long-term stress can harm your health.  How does stress affect your health?  When you are stressed, your body responds as though you are in danger. It makes hormones that speed up your heart, make you breathe faster, and give you a burst of energy. This is called the fight-or-flight stress response. If the stress is over quickly, your body goes back to normal and no harm is done.  But if stress happens too often or lasts too long, it can have bad effects. Long-term stress can make you more likely to get sick, and it can make symptoms of some diseases worse. If you tense up when you are stressed, you may develop neck, shoulder, or low back pain. Stress is linked to high blood pressure and heart disease.  Stress also harms your emotional health. It can make you rizo, tense, or depressed. Your relationships may suffer, and you may not do well at work or school.  What can you do to manage stress?  You can try these things to help manage stress:   Do something active. Exercise or activity can help reduce stress. Walking is a great way to get started. Even everyday activities such as housecleaning or yard work can help.  Try yoga or sarah chi. These techniques combine exercise and meditation. You may need  some training at first to learn them.  Do something you enjoy. For example, listen to music or go to a movie. Practice your hobby or do volunteer work.  Meditate. This can help you relax, because you are not worrying about what happened before or what may happen in the future.  Do guided imagery. Imagine yourself in any setting that helps you feel calm. You can use online videos, books, or a teacher to guide you.  Do breathing exercises. For example:  From a standing position, bend forward from the waist with your knees slightly bent. Let your arms dangle close to the floor.  Breathe in slowly and deeply as you return to a standing position. Roll up slowly and lift your head last.  Hold your breath for just a few seconds in the standing position.  Breathe out slowly and bend forward from the waist.  Let your feelings out. Talk, laugh, cry, and express anger when you need to. Talking with supportive friends or family, a counselor, or a susan leader about your feelings is a healthy way to relieve stress. Avoid discussing your feelings with people who make you feel worse.  Write. It may help to write about things that are bothering you. This helps you find out how much stress you feel and what is causing it. When you know this, you can find better ways to cope.  What can you do to prevent stress?  You might try some of these things to help prevent stress:  Manage your time. This helps you find time to do the things you want and need to do.  Get enough sleep. Your body recovers from the stresses of the day while you are sleeping.  Get support. Your family, friends, and community can make a difference in how you experience stress.  Limit your news feed. Avoid or limit time on social media or news that may make you feel stressed.  Do something active. Exercise or activity can help reduce stress. Walking is a great way to get started.  Where can you learn more?  Go to https://www.healthwise.net/patiented  Enter N032 in the  "search box to learn more about \"Learning About Stress.\"  Current as of: October 24, 2023               Content Version: 14.0    6886-1524 [employer].   Care instructions adapted under license by your healthcare professional. If you have questions about a medical condition or this instruction, always ask your healthcare professional. [employer] disclaims any warranty or liability for your use of this information.      Learning About Alcohol Use Disorder  What is alcohol use disorder?  Alcohol use disorder means that a person drinks alcohol even though it causes harm to themselves or others. It can range from mild to severe. The more symptoms of this disorder you have, the more severe it may be. People who have it may find it hard to control their use of alcohol.  People who have this disorder may argue with others about how much they're drinking. Their job may be affected because of drinking. They may drink when it's dangerous or illegal, such as when they drive. They also may have a strong need, or craving, to drink. They may feel like they must drink just to get by. Their drinking may increase their risk of getting hurt or being in a car crash.  Over time, drinking too much alcohol may cause health problems. These may include high blood pressure, liver problems, or problems with digestion.  What are the symptoms?  Maybe you've wondered about your alcohol habits or how to tell if your drinking is becoming a problem.  Here are some of the symptoms of alcohol use disorder. You may have it if you have two or more of the following symptoms:  You drink larger amounts of alcohol than you ever meant to. Or you've been drinking for a longer time than you ever meant to.  You can't cut down or control your use. Or you constantly wish you could cut down.  You spend a lot of time getting or drinking alcohol or recovering from its effects.  You have strong cravings for alcohol.  You can no longer do " your main jobs at work, at school, or at home.  You keep drinking alcohol, even though your use hurts your relationships.  You have stopped doing important activities because of your alcohol use.  You drink alcohol in situations where doing so is dangerous.  You keep drinking alcohol even though you know it's causing health problems.  You need more and more alcohol to get the same effect, or you get less effect from the same amount over time. This is called tolerance.  You have uncomfortable symptoms when you stop drinking alcohol or use less. This is called withdrawal.  Alcohol use disorder can range from mild to severe. The more symptoms you have, the more severe the disorder may be.  You might not realize that your drinking is a problem. You might not drink large amounts when you drink. Or you might go for days or weeks between drinking episodes. But even if you don't drink very often, your drinking could still be harmful and put you at risk.  How is alcohol use disorder treated?  Getting help is up to you. But you don't have to do it alone. There are many people and kinds of treatments that can help.  Treatment for alcohol use disorder can include:  Group therapy, one or more types of counseling, and alcohol education.  Medicines that help to:  Reduce withdrawal symptoms and help you safely stop drinking.  Reduce cravings for alcohol.  Support groups. These groups include Alcoholics Anonymous and Urgent Career (Self-Management and Recovery Training).  Some people are able to stop or cut back on drinking with help from a counselor. People who have moderate to severe alcohol use disorder may need medical treatment. They may need to stay in a hospital or treatment center.  You may have a treatment team to help you. This team may include a psychologist or psychiatrist, counselors, doctors, social workers, nurses, and a . A  helps plan and manage your treatment.  Follow-up care is a key part  "of your treatment and safety. Be sure to make and go to all appointments, and call your doctor if you are having problems. It's also a good idea to know your test results and keep a list of the medicines you take.  Where can you learn more?  Go to https://www.[employer].net/patiented  Enter H758 in the search box to learn more about \"Learning About Alcohol Use Disorder.\"  Current as of: November 15, 2023               Content Version: 14.0    6395-7165 [employer].   Care instructions adapted under license by your healthcare professional. If you have questions about a medical condition or this instruction, always ask your healthcare professional. [employer] disclaims any warranty or liability for your use of this information.      Substance Use Disorder: Care Instructions  Overview     You can improve your life and health by stopping your use of alcohol or drugs. When you don't drink or use drugs, you may feel and sleep better. You may get along better with your family, friends, and coworkers. There are medicines and programs that can help with substance use disorder.  How can you care for yourself at home?  Here are some ways to help you stay sober and prevent relapse.  If you have been given medicine to help keep you sober or reduce your cravings, be sure to take it exactly as prescribed.  Talk to your doctor about programs that can help you stop using drugs or drinking alcohol.  Do not keep alcohol or drugs in your home.  Plan ahead. Think about what you'll say if other people ask you to drink or use drugs. Try not to spend time with people who drink or use drugs.  Use the time and money spent on drinking or drugs to do something that's important to you.  Preventing a relapse  Have a plan to deal with relapse. Learn to recognize changes in your thinking that lead you to drink or use drugs. Get help before you start to drink or use drugs again.  Try to stay away from situations, friends, " or places that may lead you to drink or use drugs.  If you feel the need to drink alcohol or use drugs again, seek help right away. Call a trusted friend or family member. Some people get support from organizations such as Narcotics Anonymous or [employer] or from treatment facilities.  If you relapse, get help as soon as you can. Some people make a plan with another person that outlines what they want that person to do for them if they relapse. The plan usually includes how to handle the relapse and who to notify in case of relapse.  Don't give up. Remember that a relapse doesn't mean that you have failed. Use the experience to learn the triggers that lead you to drink or use drugs. Then quit again. Recovery is a lifelong process. Many people have several relapses before they are able to quit for good.  Follow-up care is a key part of your treatment and safety. Be sure to make and go to all appointments, and call your doctor if you are having problems. It's also a good idea to know your test results and keep a list of the medicines you take.  When should you call for help?   Call [phone]  anytime you think you may need emergency care. For example, call if you or someone else:    Has overdosed or has withdrawal signs. Be sure to tell the emergency workers that you are or someone else is using or trying to quit using drugs. Overdose or withdrawal signs may include:  Losing consciousness.  Seizure.  Seeing or hearing things that aren't there (hallucinations).     Is thinking or talking about suicide or harming others.   Where to get help 24 hours a day, 7 days a week   If you or someone you know talks about suicide, self-harm, a mental health crisis, a substance use crisis, or any other kind of emotional distress, get help right away. You can:    Call the Suicide and Crisis Lifeline at 573.     Call 4-809-555-TALK (1-291.446.5515).     Text HOME to 055368 to access the Crisis Text Line.   Consider saving these numbers  "in your phone.  Go to [employer] for more information or to chat online.  Call your doctor now or seek immediate medical care if:    You are having withdrawal symptoms. These may include nausea or vomiting, sweating, shakiness, and anxiety.   Watch closely for changes in your health, and be sure to contact your doctor if:    You have a relapse.     You need more help or support to stop.   Where can you learn more?  Go to https://www.[employer].net/patiented  Enter H573 in the search box to learn more about \"Substance Use Disorder: Care Instructions.\"  Current as of: November 15, 2023               Content Version: 14.0    7511-0953 [employer].   Care instructions adapted under license by your healthcare professional. If you have questions about a medical condition or this instruction, always ask your healthcare professional. [employer] disclaims any warranty or liability for your use of this information.      "

## 2024-03-20 ENCOUNTER — OFFICE VISIT (OUTPATIENT)
Dept: FAMILY MEDICINE | Facility: CLINIC | Age: 64
End: 2024-03-20
Payer: COMMERCIAL

## 2024-03-20 VITALS
OXYGEN SATURATION: 100 % | TEMPERATURE: 97.7 F | HEIGHT: 71 IN | WEIGHT: 244 LBS | HEART RATE: 64 BPM | BODY MASS INDEX: 34.16 KG/M2 | RESPIRATION RATE: 16 BRPM | SYSTOLIC BLOOD PRESSURE: 126 MMHG | DIASTOLIC BLOOD PRESSURE: 74 MMHG

## 2024-03-20 DIAGNOSIS — M79.601 PAIN OF RIGHT UPPER EXTREMITY: ICD-10-CM

## 2024-03-20 DIAGNOSIS — R19.5 POSITIVE COLORECTAL CANCER SCREENING USING COLOGUARD TEST: ICD-10-CM

## 2024-03-20 DIAGNOSIS — Z00.00 ROUTINE GENERAL MEDICAL EXAMINATION AT A HEALTH CARE FACILITY: Primary | ICD-10-CM

## 2024-03-20 DIAGNOSIS — I48.21 PERMANENT ATRIAL FIBRILLATION (H): ICD-10-CM

## 2024-03-20 PROCEDURE — 99396 PREV VISIT EST AGE 40-64: CPT | Performed by: FAMILY MEDICINE

## 2024-03-20 ASSESSMENT — PAIN SCALES - GENERAL: PAINLEVEL: NO PAIN (1)

## 2024-03-20 NOTE — PROGRESS NOTES
Preventive Care Visit  [facility]  Singh Valencia MD, Family Medicine  Mar 20, 2024          Subjective   Chandu is a 63 year old, presenting for the following:  Physical        3/20/2024     1:26 PM   Additional Questions   Roomed by Louann   Accompanied by self         3/20/2024     1:26 PM   Patient Reported Additional Medications   Patient reports taking the following new medications n/a        Health Care Directive  Patient does not have a Health Care Directive or Living Will: Discussed advance care planning with patient; however, patient declined at this time.    HPI  right shoulder and elbow pain          3/17/2024   General Health   How would you rate your overall physical health? Good   Feel stress (tense, anxious, or unable to sleep) Only a little   (!) STRESS CONCERN      3/17/2024   Nutrition   Three or more servings of calcium each day? (!) NO   Diet: I don't know   How many servings of fruit and vegetables per day? (!) 0-1   How many sweetened beverages each day? 0-1         3/17/2024   Exercise   Days per week of moderate/strenous exercise 2 days   Average minutes spent exercising at this level 20 min   (!) EXERCISE CONCERN      3/17/2024   Social Factors   Frequency of gathering with friends or relatives More than three times a week   Worry food won't last until get money to buy more No   Food not last or not have enough money for food? No   Do you have housing?  Yes   Are you worried about losing your housing? No   Lack of transportation? No   Unable to get utilities (heat,electricity)? No         3/17/2024   Fall Risk   Fallen 2 or more times in the past year? No   Trouble with walking or balance? No          3/17/2024   Dental   Dentist two times every year? (!) NO         3/17/2024   TB Screening   Were you born outside of the [place]? No         Today's PHQ-2 Score:       3/20/2024    12:56 PM   PHQ-2 ( 1999 [employer])   Q1: Little interest or pleasure in doing things 0   Q2:  Feeling down, depressed or hopeless 0   PHQ-2 Score 0   Q1: Little interest or pleasure in doing things Not at all   Q2: Feeling down, depressed or hopeless Not at all   PHQ-2 Score 0           3/17/2024   Substance Use   Alcohol more than 3/day or more than 7/wk Yes   How often do you have a drink containing alcohol 2 to 3 times a week   How many alcohol drinks on typical day 1 or 2   How often do you have 5+ drinks at one occasion Monthly   Audit 2/3 Score 2   How often not able to stop drinking once started Never   How often failed to do what normally expected Never   How often needed first drink in am after a heavy drinking session Never   How often feeling of guilt or remorse after drinking Never   How often unable to remember what happened the night before Less than monthly   Have you or someone else been injured because of your drinking No   Has anyone been concerned or suggested you cut down on drinking No   TOTAL SCORE - AUDIT 6   Do you use any other substances recreationally? (!) CANNABIS PRODUCTS     Social History     Tobacco Use    Smoking status: Former     Types: Cigars    Smokeless tobacco: Never    Tobacco comments:     occasional cigar   Vaping Use    Vaping Use: Never used   Substance Use Topics    Alcohol use: Yes     Comment: occas     Drug use: Yes     Types: Marijuana     Comment: occas            3/17/2024   STI Screening   New sexual partner(s) since last STI/HIV test? No   Last PSA:   PSA   Date Value Ref Range Status   11/15/2019 2.07 0 - 4 ug/L Final     Comment:     Assay Method:  Chemiluminescence using Siemens Vista analyzer     Prostate Specific Antigen Screen   Date Value Ref Range Status   02/22/2023 4.18 (H) 0.00 - 4.00 ug/L Final     ASCVD Risk   The 10-year ASCVD risk score (Boone WEBB, et al., 2019) is: 11%    Values used to calculate the score:      Age: 63 years      Sex: Male      Is Non- : No      Diabetic: No      Tobacco smoker: No       "Systolic Blood Pressure: 126 mmHg      Is BP treated: Yes      HDL Cholesterol: 53 mg/dL      Total Cholesterol: 199 mg/dL           Reviewed and updated as needed this visit by Provider                    Past Medical History:   Diagnosis Date    A-fib (H) 2019    Antiplatelet or antithrombotic long-term use     Arrhythmia     Hypertension 1/20    Irregular heart beat     Sleep apnea          Review of Systems  CONSTITUTIONAL: NEGATIVE for fever, chills, change in weight  INTEGUMENTARY/SKIN: NEGATIVE for worrisome rashes, moles or lesions  EYES: NEGATIVE for vision changes or irritation  ENT/MOUTH: NEGATIVE for ear, mouth and throat problems  RESP: NEGATIVE for significant cough or SOB  BREAST: NEGATIVE for masses, tenderness or discharge  CV: NEGATIVE for chest pain, palpitations or peripheral edema  GI: NEGATIVE for nausea, abdominal pain, heartburn, or change in bowel habits  : NEGATIVE for frequency, dysuria, or hematuria  MUSCULOSKELETAL: NEGATIVE for significant arthralgias or myalgia  NEURO: NEGATIVE for weakness, dizziness or paresthesias  ENDOCRINE: NEGATIVE for temperature intolerance, skin/hair changes  HEME: NEGATIVE for bleeding problems  PSYCHIATRIC: NEGATIVE for changes in mood or affect     Objective    Exam  /74   Pulse 64   Temp 97.7  F (36.5  C) (Tympanic)   Resp 16   Ht 1.803 m (5' 11\")   Wt 110.7 kg (244 lb)   SpO2 100%   BMI 34.03 kg/m     Estimated body mass index is 34.03 kg/m  as calculated from the following:    Height as of this encounter: 1.803 m (5' 11\").    Weight as of this encounter: 110.7 kg (244 lb).    Physical Exam           Signed Electronically by: Singh Valencia MD    SUBJECTIVE:  63 year old.The patient has a complaint of right upper extremity.  This started 5 months ago. Location neck shoulder elbow and forearm quality sharp Associated symptoms are none.  Brought on by wrist action or shoulder or elbow .   Reviewed health maintenance  Patient Active " "Problem List   Diagnosis    Advanced directives, counseling/discussion    Hyperlipidemia    New onset atrial fibrillation (H)    Tobacco use disorder    Benign essential hypertension    Persistent atrial fibrillation (H)    Fatigue    COVID-19     Past Medical History:   Diagnosis Date    A-fib (H) 2019    Antiplatelet or antithrombotic long-term use     Arrhythmia     Hypertension 1/20    Irregular heart beat     Sleep apnea        OBJECTIVE:  no apparent distress  /74   Pulse 64   Temp 97.7  F (36.5  C) (Tympanic)   Resp 16   Ht 1.803 m (5' 11\")   Wt 110.7 kg (244 lb)   SpO2 100%   BMI 34.03 kg/m    Full  range of motion neck, shoulder, elbow and wrist.  Slight tenderness at lateral epicondylar region   negative tunel  No wrist tenderness     ICD-10-CM    1. Routine general medical examination at a health care facility  Z00.00       2. Positive colorectal cancer screening using Cologuard test  R19.5 Colonoscopy Screening  Referral      3. Pain of right upper extremity  M79.601 EMG       PLAN: await emg        "

## 2024-03-26 ENCOUNTER — TELEPHONE (OUTPATIENT)
Dept: GASTROENTEROLOGY | Facility: CLINIC | Age: 64
End: 2024-03-26
Payer: COMMERCIAL

## 2024-03-26 NOTE — TELEPHONE ENCOUNTER
"Endoscopy Scheduling Screen    Have you had a positive Covid test in the last 14 days?  No    What is your communication preference for Instructions and/or Bowel Prep?   MyChart    What insurance is in the chart?  Other:  [facility]    Ordering/Referring Provider:     WILLIAM RUIZ      (If ordering provider performs procedure, schedule with ordering provider unless otherwise instructed. )    BMI: Estimated body mass index is 34.03 kg/m  as calculated from the following:    Height as of 3/20/24: 1.803 m (5' 11\").    Weight as of 3/20/24: 110.7 kg (244 lb).     Sedation Ordered  moderate sedation.   If patient BMI > 50 do not schedule in ASC.    If patient BMI > 45 do not schedule at [facility].    Are you taking methadone or Suboxone?  No    Have you had difficulties, pain, or discomfort during past endoscopy procedures?  No    Are you taking any prescription medications for pain 3 or more times per week?   NO, No RN review required.    Do you have a history of malignant hyperthermia?  No    (Females) Are you currently pregnant?        Have you been diagnosed or told you have pulmonary hypertension?   No    Do you have an LVAD?  No    Have you been told you have moderate to severe sleep apnea?  No    Have you been told you have COPD, asthma, or any other lung disease?  No    Do you have any heart conditions?  Yes     In the past year, have you had any hospitalizations for heart related issues including cardiomyopathy, heart attack, or stent placement?  No    Do you have any implantable devices in your body (pacemaker, ICD)?  No    Do you take nitroglycerine?  No    Have you ever had or are you waiting for an organ transplant?  No. Continue scheduling, no site restrictions.    Have you had a stroke or transient ischemic attack (TIA aka \"mini stroke\" in the last 6 months?   No    Have you been diagnosed with or been told you have cirrhosis of the liver?   No    Are you currently on dialysis?   No    Do you need " "assistance transferring?   No    BMI: Estimated body mass index is 34.03 kg/m  as calculated from the following:    Height as of 3/20/24: 1.803 m (5' 11\").    Weight as of 3/20/24: 110.7 kg (244 lb).     Is patients BMI > 40 and scheduling location [facility]?  No    Do you take an injectable medication for weight loss or diabetes (excluding insulin)?  No    Do you take the medication Naltrexone?  No    Do you take blood thinners?  Yes     Are you taking Effient/Prasugrel?  No, you must contact your prescribing provider for direction on holding or bridging with a different medication.       Prep   Are you currently on dialysis or do you have chronic kidney disease?  No    Do you have a diagnosis of diabetes?  No    Do you have a diagnosis of cystic fibrosis (CF)?  No    On a regular basis do you go 3 -5 days between bowel movements?  No    BMI > 40?  No    Preferred Pharmacy:    [facility]/pharm[facility] #[id number] - [address] AT CORNER OF [address] [address]  Phone: [phone] Fax: [phone]    Final Scheduling Details     Procedure scheduled  Colonoscopy    Surgeon:  ARNAUD     Date of procedure:  6/17     Pre-OP / PAC:   No - Not required for this site.    Location  [facility] - Per order.    Sedation   Moderate Sedation - Per order.      Patient Reminders:   You will receive a call from a Nurse to review instructions and health history.  This assessment must be completed prior to your procedure.  Failure to complete the Nurse assessment may result in the procedure being cancelled.      On the day of your procedure, please designate an adult(s) who can drive you home stay with you for the next 24 hours. The medicines used in the exam will make you sleepy. You will not be able to drive.      You cannot take public transportation, ride share services, or non-medical taxi service without a responsible caregiver.  Medical transport services are allowed with the " requirement that a responsible caregiver will receive you at your destination.  We require that drivers and caregivers are confirmed prior to your procedure.

## 2024-04-28 DIAGNOSIS — I10 BENIGN ESSENTIAL HYPERTENSION: ICD-10-CM

## 2024-05-03 ENCOUNTER — MYC REFILL (OUTPATIENT)
Dept: CARDIOLOGY | Facility: CLINIC | Age: 64
End: 2024-05-03
Payer: COMMERCIAL

## 2024-05-03 DIAGNOSIS — I10 BENIGN ESSENTIAL HYPERTENSION: ICD-10-CM

## 2024-05-04 RX ORDER — METOPROLOL SUCCINATE 50 MG/1
75 TABLET, EXTENDED RELEASE ORAL DAILY
Qty: 135 TABLET | Refills: 1 | Status: SHIPPED | OUTPATIENT
Start: 2024-05-04

## 2024-05-04 NOTE — TELEPHONE ENCOUNTER
"metoprolol succinate ER (TOPROL XL) 50 MG 24 hr tablet   Disp-135 tablet, R-0   Start: 02/01/2024     10/30/2023  [facility] Vanessa Rodriguez MD  Cardiovascular Disease    \"We will continue current medications and see patient in follow-up in 1 year \"    "

## 2024-05-07 RX ORDER — METOPROLOL SUCCINATE 50 MG/1
75 TABLET, EXTENDED RELEASE ORAL DAILY
Qty: 135 TABLET | Refills: 0 | OUTPATIENT
Start: 2024-05-07

## 2024-06-06 ENCOUNTER — TELEPHONE (OUTPATIENT)
Dept: GASTROENTEROLOGY | Facility: CLINIC | Age: 64
End: 2024-06-06
Payer: COMMERCIAL

## 2024-06-06 NOTE — TELEPHONE ENCOUNTER
Staff messages sent over to both Dr. Anaya and Dr. Hankins (Alcohol consumption and sedation, and GABRIEL hx)      Procedure details:    Patient scheduled for Colonoscopy  on 6/17/24.     Arrival time: 0730. Procedure time 0815    Facility location: [facility] [place]; [address], [place] [address]. Check in location: 2nd Floor at Surgery desk. -- has hx of GABRIEL, unable to locate AHI. Uses C-pap. Will send for review.     Sedation type: Conscious sedation  -- Per annual Physical on 3/20/24, Pt flagged for excessive alcohol consumption. Will need to send to scoping provider to review.     Pre op exam needed? N/A    Indication for procedure:   Positive colorectal cancer screening using Cologuard test [R19.5]            Chart review:     Electronic implanted devices? No    Recent diagnosis of diverticulitis within the last 6 weeks? No    Diabetic? No      Medication review:    Anticoagulants? Yes Apixaban (Eliquis): Recommended HOLD 2 days before procedure.  Consult with your managing provider.    NSAIDS? No NSAID medications per patient's medication list.  RN will verify with pre-assessment call.    Other medication HOLDING recommendations:  N/A      Prep for procedure:     Bowel prep recommendation: Standard Miralax  Due to: standard bowel prep.    Prep instructions sent via [employer]         Lary Avila RN  Endoscopy Procedure Pre Assessment RN  [phone] option 4

## 2024-06-07 ENCOUNTER — TELEPHONE (OUTPATIENT)
Dept: GASTROENTEROLOGY | Facility: CLINIC | Age: 64
End: 2024-06-07
Payer: COMMERCIAL

## 2024-06-07 NOTE — TELEPHONE ENCOUNTER
Pt ok for CS per Dr. Anaya. Waiting on reply from Dr. Hankins on GABRIEL.     Lary Avila, RN   Endoscopy Procedure Pre Assessment RN

## 2024-06-07 NOTE — TELEPHONE ENCOUNTER
Caller: Writer to patient    Reason for Reschedule/Cancellation   (please be detailed, any staff messages or encounters to note?): Patient request via "[employer]t.       Prior to reschedule please review:  Ordering Provider: Singh Valencia  Sedation Determined: Moderate  Does patient have any ASC Exclusions, please identify?: No      Notes on Cancelled Procedure:  Procedure: Lower Endoscopy [Colonoscopy]   Date: 6/17/2024  Location: [facility] [place]; [address] [place] [address] N, [address]nd Floor, [place] [address]   Surgeon: Jaclyn      Rescheduled: No, LVM to call back.        Did you cancel or rescheduled an EUS procedure? No.     CASE IN DEPOT

## 2024-06-07 NOTE — TELEPHONE ENCOUNTER
----- Message from Lary Avila RN sent at 6/7/2024  9:38 AM CDT -----  Regarding: please call the Pt  This pt is requesting a call to reschedule his procedure. Thank you. (He sent us a Mychart message). Thank you.     ISREAL Barth

## 2024-06-12 NOTE — TELEPHONE ENCOUNTER
EDIT:    Per Dr. Major's and Dr. Hankins, Pt ideally should have MAC sedation, but should also be seen in the next 1-2 months as the Pt's Cologuard test is from 2019.     Will send message to scheduling to see if this Pt can be scheduled within that time frame or not.     Lary Avila, RN   Endoscopy Procedure Pre Assessment RN

## 2024-06-13 NOTE — TELEPHONE ENCOUNTER
2nd attempt LVM for patient to call back. Per inbasket message from Dr. Anaya and Dr. Hankins if the patient cannot get in with MAC within the next 2 months then CS is ok to schedule him under. If MAC is available that should be where the patient is scheduled.     If patient calls back please reschedule with MAC if possible and if not then CS is fine.

## 2024-06-17 PROBLEM — Z71.89 ADVANCED DIRECTIVES, COUNSELING/DISCUSSION: Status: RESOLVED | Noted: 2018-02-27 | Resolved: 2024-06-17

## 2024-06-17 NOTE — TELEPHONE ENCOUNTER
Rescheduled: Yes,   Procedure: Lower Endoscopy [Colonoscopy]    Date: 08/12/2024   Location: [facility]; [address] [facility] [address], [place] [address]    Surgeon: Jaclyn   Sedation Level Scheduled  moderate ,  Reason for Sedation Level opening avaialble    Instructions updated and sent: y     Does patient need PAC or Pre -Op Rescheduled? : no       Did you cancel or rescheduled an EUS procedure? No.

## 2024-07-10 ENCOUNTER — OFFICE VISIT (OUTPATIENT)
Dept: FAMILY MEDICINE | Facility: CLINIC | Age: 64
End: 2024-07-10
Payer: COMMERCIAL

## 2024-07-10 VITALS
DIASTOLIC BLOOD PRESSURE: 86 MMHG | BODY MASS INDEX: 33.6 KG/M2 | HEIGHT: 71 IN | RESPIRATION RATE: 17 BRPM | WEIGHT: 240 LBS | OXYGEN SATURATION: 100 % | HEART RATE: 76 BPM | SYSTOLIC BLOOD PRESSURE: 134 MMHG | TEMPERATURE: 97.8 F

## 2024-07-10 DIAGNOSIS — L57.0 ACTINIC KERATOSIS: Primary | ICD-10-CM

## 2024-07-10 DIAGNOSIS — L98.9 BENIGN SKIN LESION: ICD-10-CM

## 2024-07-10 PROCEDURE — 99207 PR NO CHARGE LOS: CPT | Performed by: FAMILY MEDICINE

## 2024-07-10 PROCEDURE — 17110 DESTRUCTION B9 LES UP TO 14: CPT | Performed by: FAMILY MEDICINE

## 2024-07-10 ASSESSMENT — PAIN SCALES - GENERAL: PAINLEVEL: NO PAIN (0)

## 2024-07-10 NOTE — PROGRESS NOTES
"    ICD-10-CM    1. Actinic keratosis  L57.0 DESTRUCT BENIGN LESION, UP TO 14      2. Benign skin lesion  L98.9 EXC BENIGN SKIN LESION FACE/EARS <=0.5 CM       PLAN:keep dry and may use vaseline     Subjective   Chandu is a 63 year old, presenting for the following health issues:  Derm Problem (Skin moles, tag/s )        7/10/2024     3:33 PM   Additional Questions   Roomed by Kirby MONTALVO LPN   Accompanied by Self         7/10/2024     3:33 PM   Patient Reported Additional Medications   Patient reports taking the following new medications BENZONATATE 100 MG CAPSULE     History of Present Illness       Reason for visit:  Remove moles  Symptom onset:  More than a month  Symptoms include:  Mole spots on torso  Symptom intensity:  Mild  Symptom progression:  Worsening  Had these symptoms before:  YesHe exercises with enough effort to increase his heart rate 9 or less minutes per day.  He exercises with enough effort to increase his heart rate 3 or less days per week.   He is taking medications regularly.         Objective    /86   Pulse 76   Temp 97.8  F (36.6  C) (Tympanic)   Resp 17   Ht 1.803 m (5' 11\")   Wt 108.9 kg (240 lb)   SpO2 100%   BMI 33.47 kg/m    Body mass index is 33.47 kg/m .  Physical Exam     Several lesions with dry and crusty and N2 applied  Red raised 4 mm lesion xylocaoine/cauery    Signed Electronically by: Singh Valencia MD    "

## 2024-07-16 ENCOUNTER — MYC REFILL (OUTPATIENT)
Dept: FAMILY MEDICINE | Facility: CLINIC | Age: 64
End: 2024-07-16
Payer: COMMERCIAL

## 2024-07-16 ENCOUNTER — MYC MEDICAL ADVICE (OUTPATIENT)
Dept: CARDIOLOGY | Facility: CLINIC | Age: 64
End: 2024-07-16
Payer: COMMERCIAL

## 2024-07-16 DIAGNOSIS — I48.21 PERMANENT ATRIAL FIBRILLATION (H): ICD-10-CM

## 2024-07-24 NOTE — TELEPHONE ENCOUNTER
apixaban ANTICOAGULANT (ELIQUIS ANTICOAGULANT) 5 MG tablet          Possible duplicate: Lalitha to review recent actions on this medication    Sig: Take 1 tablet (5 mg) by mouth 2 times daily    Disp: 180 tablet    Refills: 0    Start: 7/17/2024    Class: E-Prescribe    For: Permanent atrial fibrillation (H)    Last ordered: 3 months ago (4/3/2024) by Singh Valencia MD    Anticoagulant Agents [place]1107/23/2024 04:13 PM   Protocol Details Normal Platelets on file in past 12 months    Recent (6 mo) or future (90 days) visit within the authorizing provider's specialty    Medication is active on med list    Patient is 18-79 years of age    Serum creatinine less than or equal to 1.4 on file in past 12 mos    Weight is greater than 60 kg for the past year    GFR on file in the past 12 months and most recent GFR is normal    Medication indicated for associated diagnosis      To be filled at: [facility]pharmacy #[id number] - [place], [place] - [address] AT CORNER OF [facility]          Patient saw Dr. Clement on 10/30/23. No changes were made to Eliquis. Patient Patient scheduled for follow up with Dr. Clement on 10/28/24. Patient had CBC completed on 9/26/23. Prescription sent in for patient. [employer] message sent to patient.    Dora Andersen, RN, BSN  Cardiology RN Care Coordinator   [place]   Phone: [phone]  Fax: [phone] ([place]) [phone] ([place])

## 2024-07-26 NOTE — TELEPHONE ENCOUNTER
Patient saw [employer] message. No questions at this time.    Dora Andersen, RN, BSN  Cardiology RN Care Coordinator   [place]   Phone: [phone]  Fax: [phone] ([place]) [phone] ([place])

## 2024-07-30 NOTE — PROGRESS NOTES
"  ICD-10-CM    1. Actinic keratosis  L57.0 DESTRUCT BENIGN LESION, UP TO 14       PLAN:re check 2 weeks as needed        Subjective   Chandu is a 63 year old, presenting for the following health issues:  Derm Problem (Mole/lesion treatment)        7/31/2024     8:20 AM   Additional Questions   Roomed by Kirby MONTALVO LPN   Accompanied by Self         7/31/2024     8:20 AM   Patient Reported Additional Medications   Patient reports taking the following new medications No changes     History of Present Illness       Reason for visit:  Remove more mole age spots under arms    He eats 0-1 servings of fruits and vegetables daily.He consumes 0 sweetened beverage(s) daily.He exercises with enough effort to increase his heart rate 10 to 19 minutes per day.  He exercises with enough effort to increase his heart rate 3 or less days per week.   He is taking medications regularly.     SUBJECTIVE:  63 year oldenters  With flat dry scaly lesions on thorax. Pt has tried otc med and has failed.  OBJECTIVE:/70   Pulse 68   Temp 98.2  F (36.8  C) (Tympanic)   Resp 16   Ht 1.803 m (5' 11\")   Wt 108 kg (238 lb)   SpO2 99%   BMI 33.19 kg/m     Flat scaly lesions on thorax .  N2 applied to lesions  ASSESSMENT:wart  PLAN:rtc in two week or continue otc treatment.  Singh Valencia MD          Signed Electronically by: Singh Valencia MD    "

## 2024-07-31 ENCOUNTER — OFFICE VISIT (OUTPATIENT)
Dept: FAMILY MEDICINE | Facility: CLINIC | Age: 64
End: 2024-07-31
Payer: COMMERCIAL

## 2024-07-31 VITALS
WEIGHT: 238 LBS | HEART RATE: 68 BPM | BODY MASS INDEX: 33.32 KG/M2 | OXYGEN SATURATION: 99 % | HEIGHT: 71 IN | SYSTOLIC BLOOD PRESSURE: 118 MMHG | TEMPERATURE: 98.2 F | DIASTOLIC BLOOD PRESSURE: 70 MMHG | RESPIRATION RATE: 16 BRPM

## 2024-07-31 DIAGNOSIS — L57.0 ACTINIC KERATOSIS: Primary | ICD-10-CM

## 2024-07-31 PROCEDURE — 17110 DESTRUCTION B9 LES UP TO 14: CPT | Performed by: FAMILY MEDICINE

## 2024-07-31 PROCEDURE — 99207 PR NO CHARGE LOS: CPT | Performed by: FAMILY MEDICINE

## 2024-07-31 ASSESSMENT — PAIN SCALES - GENERAL: PAINLEVEL: NO PAIN (0)

## 2024-08-04 NOTE — TELEPHONE ENCOUNTER
Rescheduled Colonoscopy    Pre visit planning completed.    NOTE: Please see previous notes below. MAC sedation recommended however, due to +cologuard and needing to expedite colonoscopy, case ok to proceed with CS per Dr. Anaya.       Procedure details:    Patient scheduled for Colonoscopy on 8/12/24.     Arrival time: 0835. Procedure time 0920    Facility location: [facility] [place]; [address] Floor, [place] [address]. Check in location: 2nd Floor at Surgery desk.    Sedation type: Conscious sedation  (see messages below)    Pre op exam needed? No.    Indication for procedure: +cologurad      Medication review:    Anticoagulants? Yes Apixaban (Eliquis): Recommended HOLD 2 days before procedure.  Consult with your managing provider.      Prep for procedure:     Bowel prep recommendation: Standard Miralax  Due to: standard bowel prep.    Prep instructions sent via [employer]         Amber Portillo RN  Endoscopy Procedure Pre Assessment   [phone] option 4

## 2024-08-05 NOTE — TELEPHONE ENCOUNTER
"[employer] message received from patient:    \"Please call me to reschedule. I have a unexpected out of town business trip i have to take next week\"    ---------------------------------------------------------------------------------------  Writer forwarded [employer] message to endoscopy scheduling team to assist with rescheduling.     Amber Portillo RN  Endoscopy Procedure Pre Assessment   [phone] option 4   "

## 2024-08-05 NOTE — TELEPHONE ENCOUNTER
Caller: Writer to pt     Reason for Reschedule/Cancellation   (please be detailed, any staff messages or encounters to note?): pt requested to reschedule d/t work trip      Prior to reschedule please review:  Ordering Provider: Singh Valencia MD  Sedation Determined: Moderate but if MAC is available Stesarabjitens would prefer MAC  Does patient have any ASC Exclusions, please identify?: No      Notes on Cancelled Procedure:  Procedure: Lower Endoscopy [Colonoscopy]   Date: 08/12/2024  Location: [facility]; [address], [place] [address]   Surgeon: JACLYN      Rescheduled: Yes,   Procedure: Lower Endoscopy [Colonoscopy]    Date: 09/30/2024   Location: [facility]; [address] Floor, [place] [address]    Surgeon: JACLYN   Sedation Level Scheduled  Moderate ,  Reason for Sedation Level Per Order / Okayed per Jaclyn   Instructions updated and sent: Yes, mychart     Does patient need PAC or Pre -Op Rescheduled? : No       Did you cancel or rescheduled an EUS procedure? No.

## 2024-09-13 ENCOUNTER — TELEPHONE (OUTPATIENT)
Dept: CARDIOLOGY | Facility: CLINIC | Age: 64
End: 2024-09-13
Payer: COMMERCIAL

## 2024-09-13 NOTE — TELEPHONE ENCOUNTER
Called and LVM for patient rescheduling 11/4 follow-up with Dr. Clement to 12/9 (next available in-person opening, new patient slot approved by management). Asked patient to return call if appointment will not work.    SUSANNE Spence

## 2024-09-17 ENCOUNTER — MYC MEDICAL ADVICE (OUTPATIENT)
Dept: CARDIOLOGY | Facility: CLINIC | Age: 64
End: 2024-09-17
Payer: COMMERCIAL

## 2024-09-17 NOTE — TELEPHONE ENCOUNTER
Called patient could not LVM so I sent patient a [employer] message regarding scheduling him on October 14 at 12:45

## 2024-09-19 ENCOUNTER — MYC MEDICAL ADVICE (OUTPATIENT)
Dept: CARDIOLOGY | Facility: CLINIC | Age: 64
End: 2024-09-19
Payer: COMMERCIAL

## 2024-09-19 NOTE — TELEPHONE ENCOUNTER
Pre assessment completed for upcoming procedure.   (Please see previous telephone encounter notes for complete details)    Procedure details:    Arrival time and facility location reviewed.    Pre op exam needed? No.    Designated  policy reviewed. Instructed to have someone stay 6  hours post procedure.       Medication review:    Medications reviewed. Please see supporting documentation below. Holding recommendations discussed (if applicable).       Prep for procedure:     Procedure prep instructions reviewed.        Any additional information needed:  N/A      Patient  verbalized understanding and had no questions or concerns at this time.      Amber Portillo RN  Endoscopy Procedure Pre Assessment   [phone] option 2

## 2024-09-19 NOTE — TELEPHONE ENCOUNTER
Rescheduled procedure    Pt rescheduled due to work conflict.     Patient scheduled for Colonoscopy on 9/30/24.    Arrival time: 0725. Procedure time 0810    Facility location: [facility] [place]; [address], [place] [address]    Sedation type: Conscious sedation  -- Ok per below.     Pre op exam needed? No.    Resent prep instructions via [employer] Inchart.    See below for additional information.     Anticoagulants? Yes Apixaban (Eliquis): Recommended HOLD 2 days before procedure.  Consult with your managing provider.     Lary Avila RN  Endoscopy Procedure Pre Assessment

## 2024-09-30 ENCOUNTER — HOSPITAL ENCOUNTER (OUTPATIENT)
Facility: AMBULATORY SURGERY CENTER | Age: 64
Discharge: HOME | End: 2024-09-30
Attending: INTERNAL MEDICINE | Admitting: INTERNAL MEDICINE
Payer: COMMERCIAL

## 2024-09-30 VITALS
OXYGEN SATURATION: 95 % | TEMPERATURE: 96.9 F | RESPIRATION RATE: 16 BRPM | DIASTOLIC BLOOD PRESSURE: 74 MMHG | HEART RATE: 65 BPM | SYSTOLIC BLOOD PRESSURE: 147 MMHG

## 2024-09-30 DIAGNOSIS — K62.1 RECTAL POLYP: Primary | ICD-10-CM

## 2024-09-30 LAB — COLONOSCOPY: NORMAL

## 2024-09-30 PROCEDURE — 45385 COLONOSCOPY W/LESION REMOVAL: CPT | Mod: PT

## 2024-09-30 PROCEDURE — G8907 PT DOC NO EVENTS ON DISCHARG: HCPCS

## 2024-09-30 PROCEDURE — G8918 PT W/O PREOP ORDER IV AB PRO: HCPCS

## 2024-09-30 PROCEDURE — 88305 TISSUE EXAM BY PATHOLOGIST: CPT | Performed by: PATHOLOGY

## 2024-09-30 RX ORDER — NALOXONE HYDROCHLORIDE 0.4 MG/ML
0.4 INJECTION, SOLUTION INTRAMUSCULAR; INTRAVENOUS; SUBCUTANEOUS
Status: DISCONTINUED | OUTPATIENT
Start: 2024-09-30 | End: 2024-10-01 | Stop reason: HOSPADM

## 2024-09-30 RX ORDER — LIDOCAINE 40 MG/G
CREAM TOPICAL
Status: DISCONTINUED | OUTPATIENT
Start: 2024-09-30 | End: 2024-10-01 | Stop reason: HOSPADM

## 2024-09-30 RX ORDER — NALOXONE HYDROCHLORIDE 0.4 MG/ML
0.2 INJECTION, SOLUTION INTRAMUSCULAR; INTRAVENOUS; SUBCUTANEOUS
Status: DISCONTINUED | OUTPATIENT
Start: 2024-09-30 | End: 2024-10-01 | Stop reason: HOSPADM

## 2024-09-30 RX ORDER — ONDANSETRON 2 MG/ML
4 INJECTION INTRAMUSCULAR; INTRAVENOUS EVERY 6 HOURS PRN
Status: DISCONTINUED | OUTPATIENT
Start: 2024-09-30 | End: 2024-10-01 | Stop reason: HOSPADM

## 2024-09-30 RX ORDER — FLUMAZENIL 0.1 MG/ML
0.2 INJECTION, SOLUTION INTRAVENOUS
Status: ACTIVE | OUTPATIENT
Start: 2024-09-30 | End: 2024-09-30

## 2024-09-30 RX ORDER — PROCHLORPERAZINE MALEATE 10 MG
10 TABLET ORAL EVERY 6 HOURS PRN
Status: DISCONTINUED | OUTPATIENT
Start: 2024-09-30 | End: 2024-10-01 | Stop reason: HOSPADM

## 2024-09-30 RX ORDER — ONDANSETRON 4 MG/1
4 TABLET, ORALLY DISINTEGRATING ORAL EVERY 6 HOURS PRN
Status: DISCONTINUED | OUTPATIENT
Start: 2024-09-30 | End: 2024-10-01 | Stop reason: HOSPADM

## 2024-09-30 RX ORDER — FENTANYL CITRATE 50 UG/ML
INJECTION, SOLUTION INTRAMUSCULAR; INTRAVENOUS PRN
Status: DISCONTINUED | OUTPATIENT
Start: 2024-09-30 | End: 2024-09-30 | Stop reason: HOSPADM

## 2024-09-30 RX ORDER — ONDANSETRON 2 MG/ML
4 INJECTION INTRAMUSCULAR; INTRAVENOUS
Status: DISCONTINUED | OUTPATIENT
Start: 2024-09-30 | End: 2024-10-01 | Stop reason: HOSPADM

## 2024-09-30 NOTE — H&P
ENDOSCOPY PRE-SEDATION H&P FOR OUTPATIENT PROCEDURES    Augusto Meeks  [id number]  1960    Procedure: colonoscopy    Pre-procedure diagnosis: positive cologuard test    Past medical history:   Past Medical History:   Diagnosis Date    A-fib (H) 2019    Antiplatelet or antithrombotic long-term use     Arrhythmia     Hypertension 1/20    Irregular heart beat     Sleep apnea        Past surgical history:   Past Surgical History:   Procedure Laterality Date    ANESTHESIA CARDIOVERSION N/A 9/25/2019    Procedure: Anesthesia Coverage Transesophageal Echocardiogram, Cardioversion @0930;  Surgeon: GENERIC ANESTHESIA PROVIDER;  Location: [facility] OR    ANESTHESIA CARDIOVERSION N/A 10/30/2019    Procedure: ANESTHESIA, FOR CARDIOVERSION @1100;  Surgeon: GENERIC ANESTHESIA PROVIDER;  Location: [facility] OR    ANESTHESIA CARDIOVERSION N/A 12/5/2019    Procedure: CARDIOVERSION;  Surgeon: GENERIC ANESTHESIA PROVIDER;  Location: [facility] OR    ANESTHESIA CARDIOVERSION N/A 5/21/2021    Procedure: ANESTHESIA, FOR CARDIOVERSION @1330;  Surgeon: GENERIC ANESTHESIA PROVIDER;  Location:  OR    CARDIAC SURGERY  2/22/21    Ablaution    CARDIOVERSION  10/30/2019    Patient reported he had a cardioverson on 09/25/2019.    EP ABLATION FOCAL AFIB N/A 1/22/2021    Procedure: EP ABLATION FOCAL AFIB;  Surgeon: Vanessa Clement MD;  Location:  [facility] CATH LAB    EP ABLATION FOCAL AFIB N/A 11/24/2021    Procedure: EP ABLATION FOCAL AFIB W/INTRA OP DOT;  Surgeon: Prashanth Polanco MD;  Location:  HEART [facility] CATH LAB    VASECTOMY         Current Outpatient Medications   Medication Sig Dispense Refill    amLODIPine (NORVASC) 5 MG tablet Take 1 tablet (5 mg) by mouth daily 90 tablet 3    apixaban ANTICOAGULANT (ELIQUIS ANTICOAGULANT) 5 MG tablet Take 1 tablet (5 mg) by mouth 2 times daily 180 tablet 1    metoprolol succinate ER (TOPROL XL) 50 MG 24 hr tablet Take 1.5 tablets (75 mg) by mouth daily 135 tablet 1    sildenafil (VIAGRA) 100 MG tablet Take  1 tablet (100 mg) by mouth daily as needed 120 tablet 1     Current Facility-Administered Medications   Medication Dose Route Frequency Provider Last Rate Last Admin    lidocaine (LMX4) kit   Topical Q1H PRN Aakash Anaya MD        lidocaine 1 % 0.1-1 mL  0.1-1 mL Other Q1H PRN Aakash Anaya MD        ondansetron (ZOFRAN) injection 4 mg  4 mg Intravenous Once PRN Aakash Anaya MD        sodium chloride (PF) 0.9% PF flush 3 mL  3 mL Intracatheter Q8H Aakash Anaya MD        sodium chloride (PF) 0.9% PF flush 3 mL  3 mL Intracatheter q1 min prn Aakash Anaya MD           Allergies   Allergen Reactions    Lisinopril Cough       History of Anesthesia/Sedation Problems: no    PHYSICAL EXAMINATION:  Constitutional: aaox3, cooperative, pleasant  Vitals reviewed: BP (!) 146/83   Temp 96.8  F (36  C) (Temporal)   Resp 16   SpO2 99%   Wt:   Wt Readings from Last 2 Encounters:   07/31/24 108 kg (238 lb)   07/10/24 108.9 kg (240 lb)      Eyes: Sclera anicteric/injected  Ears/nose/mouth/throat: Normal oropharynx without ulcers or exudate, mucus membranes moist, hearing intact  Neck: supple, thyroid normal size  CV: No edema  Respiratory: Unlabored breathing  Lymph: No submandibular, supraclavicular or inguinal lymphadenopathy  Abd: Nondistended, no masses, nontender  Skin: warm, perfused, no jaundice  Psych: Normal affect  MSK: normal movement on limited exam.    ASA Score: See Provation note    Assessment/Plan:     The patient is an appropriate candidate to receive sedation.    Informed consent was discussed with the patient/family, including the risks, benefits, potential complications and any alternative options associated with sedation.    Patient assessment completed just prior to sedation and while under constant observation by the provider. Condition determined to be adequate for proceeding with sedation.    The specific risks for the procedure were  discussed with the patient at the time of informed consent and include but are not limited to perforation which could require surgery, missing significant neoplasm or lesion, hemorrhage and adverse sedative complication.      Aakash Anaya MD

## 2024-10-02 LAB
PATH REPORT.COMMENTS IMP SPEC: NORMAL
PATH REPORT.COMMENTS IMP SPEC: NORMAL
PATH REPORT.FINAL DX SPEC: NORMAL
PATH REPORT.GROSS SPEC: NORMAL
PATH REPORT.MICROSCOPIC SPEC OTHER STN: NORMAL
PATH REPORT.RELEVANT HX SPEC: NORMAL
PHOTO IMAGE: NORMAL

## 2024-10-14 ENCOUNTER — OFFICE VISIT (OUTPATIENT)
Dept: CARDIOLOGY | Facility: CLINIC | Age: 64
End: 2024-10-14
Payer: COMMERCIAL

## 2024-10-14 ENCOUNTER — PATIENT OUTREACH (OUTPATIENT)
Dept: GASTROENTEROLOGY | Facility: CLINIC | Age: 64
End: 2024-10-14

## 2024-10-14 VITALS
BODY MASS INDEX: 34.59 KG/M2 | SYSTOLIC BLOOD PRESSURE: 146 MMHG | OXYGEN SATURATION: 99 % | HEART RATE: 65 BPM | DIASTOLIC BLOOD PRESSURE: 93 MMHG | WEIGHT: 248 LBS

## 2024-10-14 DIAGNOSIS — I48.21 PERMANENT ATRIAL FIBRILLATION (H): ICD-10-CM

## 2024-10-14 DIAGNOSIS — I10 BENIGN ESSENTIAL HYPERTENSION: ICD-10-CM

## 2024-10-14 LAB
ERYTHROCYTE [DISTWIDTH] IN BLOOD BY AUTOMATED COUNT: 13.1 % (ref 10–15)
HCT VFR BLD AUTO: 43 % (ref 40–53)
HGB BLD-MCNC: 15.5 G/DL (ref 13.3–17.7)
MCH RBC QN AUTO: 33 PG (ref 26.5–33)
MCHC RBC AUTO-ENTMCNC: 36 G/DL (ref 31.5–36.5)
MCV RBC AUTO: 92 FL (ref 78–100)
PLATELET # BLD AUTO: 138 10E3/UL (ref 150–450)
RBC # BLD AUTO: 4.69 10E6/UL (ref 4.4–5.9)
WBC # BLD AUTO: 6.9 10E3/UL (ref 4–11)

## 2024-10-14 PROCEDURE — 99214 OFFICE O/P EST MOD 30 MIN: CPT | Performed by: INTERNAL MEDICINE

## 2024-10-14 PROCEDURE — 80048 BASIC METABOLIC PNL TOTAL CA: CPT | Performed by: INTERNAL MEDICINE

## 2024-10-14 PROCEDURE — 85027 COMPLETE CBC AUTOMATED: CPT | Performed by: INTERNAL MEDICINE

## 2024-10-14 PROCEDURE — 36415 COLL VENOUS BLD VENIPUNCTURE: CPT | Performed by: INTERNAL MEDICINE

## 2024-10-14 RX ORDER — AMLODIPINE BESYLATE 10 MG/1
10 TABLET ORAL DAILY
Qty: 90 TABLET | Refills: 3 | Status: SHIPPED | OUTPATIENT
Start: 2024-10-14

## 2024-10-14 NOTE — LETTER
10/14/2024      RE: Augusto Meeks  [address] [facility] [address]       Dear Colleague,    Thank you for the opportunity to participate in the care of your patient, Augusto Meeks, at the [facility]. Please see a copy of my visit note below.    HPI: Purpose of visit: Patient comes back for follow-up of atrial fibrillation.     Mr. Augusto Meeks is a 63-year-old gentleman with a medical history significant for hypertension, obesity and permanent atrial fibrillation. The patient does not have any history of diabetes, coronary artery disease, previous strokes, TIAs or heart failure.     Patient's last visit with me was in October 20, 2023.  In the last 1 year, patient has been doing well without any known recurrence of atrial fibrillation.  He did not report any symptoms of prolonged palpitations, irregular heartbeat sensation, exertional dyspnea, exertional angina, frequent lightheadedness, presyncope or syncope.    He mentioned that his blood pressure has been recorded at 140 systolic on several occasions.     PAST MEDICAL HISTORY:  Past Medical History:   Diagnosis Date     A-fib (H) 2019     Antiplatelet or antithrombotic long-term use      Arrhythmia      Hypertension 1/20     Irregular heart beat      Sleep apnea        CURRENT MEDICATIONS:  Current Outpatient Medications   Medication Sig Dispense Refill     amLODIPine (NORVASC) 10 MG tablet Take 1 tablet (10 mg) by mouth daily. 90 tablet 3     apixaban ANTICOAGULANT (ELIQUIS ANTICOAGULANT) 5 MG tablet Take 1 tablet (5 mg) by mouth 2 times daily 180 tablet 1     metoprolol succinate ER (TOPROL XL) 50 MG 24 hr tablet Take 1.5 tablets (75 mg) by mouth daily 135 tablet 1     sildenafil (VIAGRA) 100 MG tablet Take 1 tablet (100 mg) by mouth daily as needed 120 tablet 1       PAST SURGICAL HISTORY:  Past Surgical History:   Procedure Laterality Date     ANESTHESIA  CARDIOVERSION N/A 9/25/2019    Procedure: Anesthesia Coverage Transesophageal Echocardiogram, Cardioversion @0930;  Surgeon: GENERIC ANESTHESIA PROVIDER;  [facility]     ANESTHESIA CARDIOVERSION N/A 10/30/2019    Procedure: ANESTHESIA, FOR CARDIOVERSION @1100;  Surgeon: GENERIC ANESTHESIA PROVIDER;  [facility]     ANESTHESIA CARDIOVERSION N/A 12/5/2019    Procedure: CARDIOVERSION;  Surgeon: GENERIC ANESTHESIA PROVIDER;  [facility]     ANESTHESIA CARDIOVERSION N/A 5/21/2021    Procedure: ANESTHESIA, FOR CARDIOVERSION @1330;  Surgeon: GENERIC ANESTHESIA PROVIDER;  [facility]     CARDIAC SURGERY  2/22/21    Ablaution     CARDIOVERSION  10/30/2019    Patient reported he had a cardioverson on 09/25/2019.     COLONOSCOPY N/A 9/30/2024    Procedure: COLONOSCOPY, FLEXIBLE, WITH LESION REMOVAL USING SNARE;  Surgeon: Aakash Anaya MD;  [facility]     COLONOSCOPY WITH CO2 INSUFFLATION N/A 9/30/2024    Procedure: Colonoscopy with CO2 insufflation;  Surgeon: Aakash Anaya MD;  Location: [facility] OR     EP ABLATION FOCAL AFIB N/A 1/22/2021    Procedure: EP ABLATION FOCAL AFIB;  Surgeon: Vanessa Clement MD;  Location:  HEART CARDIAC CATH LAB     EP ABLATION FOCAL AFIB N/A 11/24/2021    Procedure: EP ABLATION FOCAL AFIB W/INTRA OP DOT;  Surgeon: Prashanth Polanco MD;  Location:  HEART CARDIAC CATH LAB     VASECTOMY         ALLERGIES:     Allergies   Allergen Reactions     Lisinopril Cough       FAMILY HISTORY:  - Premature coronary artery disease  - Atrial fibrillation  - Sudden cardiac death     SOCIAL HISTORY:  Social History     Tobacco Use     Smoking status: Former     Types: Cigars     Smokeless tobacco: Never     Tobacco comments:     occasional cigar   Vaping Use     Vaping status: Never Used   Substance Use Topics     Alcohol use: Yes     Comment: occas      Drug use: Yes     Types: Marijuana     Comment: occas        ROS:   Constitutional: No fever, chills, or sweats. Weight  stable.   ENT: No visual disturbance, ear ache, epistaxis, sore throat.   Cardiovascular: As per HPI.   Respiratory: No cough, hemoptysis.    GI: No nausea, vomiting, hematemesis, melena, or hematochezia.   : No hematuria.   Integument: Negative.   Psychiatric: Negative.   Hematologic:  Easy bruising, no easy bleeding.  Neuro: Negative.   Endocrinology: No significant heat or cold intolerance   Musculoskeletal: No myalgia.    Exam:  BP (!) 141/85 (BP Location: Right arm, Patient Position: Chair, Cuff Size: Adult Large)   Pulse 65   Wt 112.5 kg (248 lb)   SpO2 99%   BMI 34.59 kg/m    GENERAL APPEARANCE: healthy, alert and no distress  HEENT: no icterus, no xanthelasmas, normal pupil size and reaction, normal palate, mucosa moist, no central cyanosis  NECK: no adenopathy, no asymmetry, masses, or scars, thyroid normal to palpation and no bruits, JVP not elevated  RESPIRATORY: lungs clear to auscultation - no rales, rhonchi or wheezes, no use of accessory muscles, no retractions, respirations are unlabored, normal respiratory rate  CARDIOVASCULAR: irregular rhythm, normal S1 with physiologic split S2, no S3 or S4 and no murmur, click or rub, precordium quiet with normal PMI.  ABDOMEN: soft, non tender, without hepatosplenomegaly, no masses palpable, bowel sounds normal, aorta not enlarged by palpation, no abdominal bruits  EXTREMITIES: peripheral pulses normal, no edema, no bruits  NEURO: alert and oriented to person/place/time, normal speech, gait and affect  VASC: Radial, femoral, dorsalis pedis and posterior tibialis pulses are normal in volumes and symmetric bilaterally. No bruits are heard.  SKIN: no ecchymoses, no rashes    Labs:  CBC RESULTS:   Lab Results   Component Value Date    WBC 6.4 09/26/2023    WBC 5.3 01/22/2021    RBC 4.70 09/26/2023    RBC 3.88 (L) 01/22/2021    HGB 15.7 09/26/2023    HGB 12.9 (L) 01/22/2021    HCT 43.3 09/26/2023    HCT 37.3 (L) 01/22/2021    MCV 92 09/26/2023    MCV 96  01/22/2021    MCH 33.4 (H) 09/26/2023    MCH 33.2 (H) 01/22/2021    MCHC 36.3 09/26/2023    MCHC 34.6 01/22/2021    RDW 12.9 09/26/2023    RDW 14.9 01/22/2021     (L) 09/26/2023     (L) 01/22/2021       BMP RESULTS:  Lab Results   Component Value Date     09/26/2023     01/22/2021    POTASSIUM 5.0 09/26/2023    POTASSIUM 4.5 02/22/2023    POTASSIUM 4.3 05/21/2021    CHLORIDE 103 09/26/2023    CHLORIDE 105 02/22/2023    CHLORIDE 108 01/22/2021    CO2 25 09/26/2023    CO2 29 02/22/2023    CO2 25 01/22/2021    ANIONGAP 8 09/26/2023    ANIONGAP 5 02/22/2023    ANIONGAP 6 01/22/2021     (H) 09/26/2023     (H) 02/22/2023     (H) 01/22/2021    BUN 13.0 09/26/2023    BUN 11 02/22/2023    BUN 13 01/22/2021    CR 1.04 09/26/2023    CR 0.82 01/22/2021    GFRESTIMATED 82 09/26/2023    GFRESTIMATED >90 01/22/2021    GFRESTBLACK >90 01/22/2021    CHALINO 9.4 09/26/2023    CHALINO 8.7 01/22/2021        INR RESULTS:  Lab Results   Component Value Date    INR 1.26 (H) 11/24/2021    INR 1.31 (H) 11/19/2021    INR 1.39 (H) 09/25/2019       Procedures:      Assessment and Plan:     Permanent atrial fibrillation-ventricular rate well controlled by metoprolol XL 75 mg once daily     It is encouraging that patient's ventricular rate is well controlled by Toprol-XL 75 mg once daily    We will increase amlodipine to 10 mg once daily to provide better blood pressure control..  We will see patient in follow-up in 1 year.  All questions and concerns were addressed and patient and his wife were happy with the plan.    CC  Patient Care Team:  Singh Valencia MD as PCP - General (Family Medicine)  Vanessa Clement MD as Assigned Heart and Vascular Provider  Hiram Covarrubias MD as MD (Critical Care)  Vanessa Clement MD as Referring Physician (Clinical Cardiac Electrophysiology)  Singh Valencia MD as Assigned PCP  Hiram Covarrubias MD as Assigned Pulmonology Provider  SELF, REFERRED        Please do  not hesitate to contact me if you have any questions/concerns.     Sincerely,     Vanessa Clement MD

## 2024-10-14 NOTE — PATIENT INSTRUCTIONS
Thank you for coming to the [facility] @ [place]; please note the following instructions:    1. Increase amlodipine to 10 mg daily.  A new prescription was sent to your  preferred pharmacy    2.  Labs today to check kidney function, electrolytes and blood count    3.  Dr. Vanessa Clement recommends to follow up in 1 year.  The cardiology team will contact you to schedule when the time gets closer.    When checking your blood pressure at home:  ~upper arm cuff is preferred versus wrist cuff due to accuracy  ~Sit in a chair  ~Rest for 5 minutes  ~Avoid alcohol, nicotine, caffeine, exercise, food or drink 30 minutes prior  ~urinate prior to checking if needed  ~Elbow is at about heart level  ~Feet flat on the floor, no crossing legs or feet  ~No talking, minimal movement   ~Place cuff on bare skin        If you have any questions regarding your visit please contact your care team:     Cardiology  Telephone Number   Beronica JORDAN., RN  Dora GONZALEZ, RN  Masha VAZQUEZ, RN  Lyric PARRISH, RMA  Yoselin SOLIS, RMA  Earlene CASTREJON, Visit Facilitator  [facility] [phone] (option 1)   For scheduling appts:     [phone] (select option 1)       For the Device Clinic (Pacemakers and ICD's)  RN's :  Arleth Scanlon   During business hours: [phone]    *After business hours:  [phone] (select option 4)      Normal test result notifications will be released via [employer] or mailed within 7 business days.  All other test results, will be communicated via telephone once reviewed by your cardiologist.    If you need a medication refill please contact your pharmacy.  Please allow 3 business days for your refill to be completed.    As always, thank you for trusting us with your health care needs!

## 2024-10-14 NOTE — NURSING NOTE
"Chief Complaint   Patient presents with    RECHECK     Return EP cardiology for annual       Initial BP (!) 141/85 (BP Location: Right arm, Patient Position: Chair, Cuff Size: Adult Large)   Pulse 65   Wt 112.5 kg (248 lb)   SpO2 99%   BMI 34.59 kg/m   Estimated body mass index is 34.59 kg/m  as calculated from the following:    Height as of 7/31/24: 1.803 m (5' 11\").    Weight as of this encounter: 112.5 kg (248 lb)..  BP completed using cuff size: MAYRA Shin    "

## 2024-10-14 NOTE — PROGRESS NOTES
HPI: Purpose of visit: Patient comes back for follow-up of atrial fibrillation.     Mr. Augusto Meeks is a 63-year-old gentleman with a medical history significant for hypertension, obesity and permanent atrial fibrillation. The patient does not have any history of diabetes, coronary artery disease, previous strokes, TIAs or heart failure.     Patient's last visit with me was in October 20, 2023.  In the last 1 year, patient has been doing well without any known recurrence of atrial fibrillation.  He did not report any symptoms of prolonged palpitations, irregular heartbeat sensation, exertional dyspnea, exertional angina, frequent lightheadedness, presyncope or syncope.    He mentioned that his blood pressure has been recorded at 140 systolic on several occasions.     PAST MEDICAL HISTORY:  Past Medical History:   Diagnosis Date    A-fib (H) 2019    Antiplatelet or antithrombotic long-term use     Arrhythmia     Hypertension 1/20    Irregular heart beat     Sleep apnea        CURRENT MEDICATIONS:  Current Outpatient Medications   Medication Sig Dispense Refill    amLODIPine (NORVASC) 10 MG tablet Take 1 tablet (10 mg) by mouth daily. 90 tablet 3    apixaban ANTICOAGULANT (ELIQUIS ANTICOAGULANT) 5 MG tablet Take 1 tablet (5 mg) by mouth 2 times daily 180 tablet 1    metoprolol succinate ER (TOPROL XL) 50 MG 24 hr tablet Take 1.5 tablets (75 mg) by mouth daily 135 tablet 1    sildenafil (VIAGRA) 100 MG tablet Take 1 tablet (100 mg) by mouth daily as needed 120 tablet 1       PAST SURGICAL HISTORY:  Past Surgical History:   Procedure Laterality Date    ANESTHESIA CARDIOVERSION N/A 9/25/2019    Procedure: Anesthesia Coverage Transesophageal Echocardiogram, Cardioversion @0930;  Surgeon: GENERIC ANESTHESIA PROVIDER;  [facility]    ANESTHESIA CARDIOVERSION N/A 10/30/2019    Procedure: ANESTHESIA, FOR CARDIOVERSION @1100;  Surgeon: GENERIC ANESTHESIA PROVIDER;  [facility]    ANESTHESIA CARDIOVERSION N/A  12/5/2019    Procedure: CARDIOVERSION;  Surgeon: GENERIC ANESTHESIA PROVIDER;  [facility]    ANESTHESIA CARDIOVERSION N/A 5/21/2021    Procedure: ANESTHESIA, FOR CARDIOVERSION @1330;  Surgeon: GENERIC ANESTHESIA PROVIDER;  [facility]    CARDIAC SURGERY  2/22/21    Ablaution    CARDIOVERSION  10/30/2019    Patient reported he had a cardioverson on 09/25/2019.    COLONOSCOPY N/A 9/30/2024    Procedure: COLONOSCOPY, FLEXIBLE, WITH LESION REMOVAL USING SNARE;  Surgeon: Aakash Anaya MD;  [facility]    COLONOSCOPY WITH CO2 INSUFFLATION N/A 9/30/2024    Procedure: Colonoscopy with CO2 insufflation;  Surgeon: Aakash Anaya MD;  Location[facility] OR    EP ABLATION FOCAL AFIB N/A 1/22/2021    Procedure: EP ABLATION FOCAL AFIB;  Surgeon: Vanessa Clement MD;  Location:  HEART CARDIAC CATH LAB    EP ABLATION FOCAL AFIB N/A 11/24/2021    Procedure: EP ABLATION FOCAL AFIB W/INTRA OP DOT;  Surgeon: Prashanth Polanco MD;  Location:  HEART CARDIAC CATH LAB    VASECTOMY         ALLERGIES:     Allergies   Allergen Reactions    Lisinopril Cough       FAMILY HISTORY:  - Premature coronary artery disease  - Atrial fibrillation  - Sudden cardiac death     SOCIAL HISTORY:  Social History     Tobacco Use    Smoking status: Former     Types: Cigars    Smokeless tobacco: Never    Tobacco comments:     occasional cigar   Vaping Use    Vaping status: Never Used   Substance Use Topics    Alcohol use: Yes     Comment: occas     Drug use: Yes     Types: Marijuana     Comment: occas        ROS:   Constitutional: No fever, chills, or sweats. Weight stable.   ENT: No visual disturbance, ear ache, epistaxis, sore throat.   Cardiovascular: As per HPI.   Respiratory: No cough, hemoptysis.    GI: No nausea, vomiting, hematemesis, melena, or hematochezia.   : No hematuria.   Integument: Negative.   Psychiatric: Negative.   Hematologic:  Easy bruising, no easy bleeding.  Neuro: Negative.   Endocrinology: No  significant heat or cold intolerance   Musculoskeletal: No myalgia.    Exam:  BP (!) 141/85 (BP Location: Right arm, Patient Position: Chair, Cuff Size: Adult Large)   Pulse 65   Wt 112.5 kg (248 lb)   SpO2 99%   BMI 34.59 kg/m    GENERAL APPEARANCE: healthy, alert and no distress  HEENT: no icterus, no xanthelasmas, normal pupil size and reaction, normal palate, mucosa moist, no central cyanosis  NECK: no adenopathy, no asymmetry, masses, or scars, thyroid normal to palpation and no bruits, JVP not elevated  RESPIRATORY: lungs clear to auscultation - no rales, rhonchi or wheezes, no use of accessory muscles, no retractions, respirations are unlabored, normal respiratory rate  CARDIOVASCULAR: irregular rhythm, normal S1 with physiologic split S2, no S3 or S4 and no murmur, click or rub, precordium quiet with normal PMI.  ABDOMEN: soft, non tender, without hepatosplenomegaly, no masses palpable, bowel sounds normal, aorta not enlarged by palpation, no abdominal bruits  EXTREMITIES: peripheral pulses normal, no edema, no bruits  NEURO: alert and oriented to person/place/time, normal speech, gait and affect  VASC: Radial, femoral, dorsalis pedis and posterior tibialis pulses are normal in volumes and symmetric bilaterally. No bruits are heard.  SKIN: no ecchymoses, no rashes    Labs:  CBC RESULTS:   Lab Results   Component Value Date    WBC 6.4 09/26/2023    WBC 5.3 01/22/2021    RBC 4.70 09/26/2023    RBC 3.88 (L) 01/22/2021    HGB 15.7 09/26/2023    HGB 12.9 (L) 01/22/2021    HCT 43.3 09/26/2023    HCT 37.3 (L) 01/22/2021    MCV 92 09/26/2023    MCV 96 01/22/2021    MCH 33.4 (H) 09/26/2023    MCH 33.2 (H) 01/22/2021    MCHC 36.3 09/26/2023    MCHC 34.6 01/22/2021    RDW 12.9 09/26/2023    RDW 14.9 01/22/2021     (L) 09/26/2023     (L) 01/22/2021       BMP RESULTS:  Lab Results   Component Value Date     09/26/2023     01/22/2021    POTASSIUM 5.0 09/26/2023    POTASSIUM 4.5 02/22/2023     POTASSIUM 4.3 05/21/2021    CHLORIDE 103 09/26/2023    CHLORIDE 105 02/22/2023    CHLORIDE 108 01/22/2021    CO2 25 09/26/2023    CO2 29 02/22/2023    CO2 25 01/22/2021    ANIONGAP 8 09/26/2023    ANIONGAP 5 02/22/2023    ANIONGAP 6 01/22/2021     (H) 09/26/2023     (H) 02/22/2023     (H) 01/22/2021    BUN 13.0 09/26/2023    BUN 11 02/22/2023    BUN 13 01/22/2021    CR 1.04 09/26/2023    CR 0.82 01/22/2021    GFRESTIMATED 82 09/26/2023    GFRESTIMATED >90 01/22/2021    GFRESTBLACK >90 01/22/2021    CHALINO 9.4 09/26/2023    CHALINO 8.7 01/22/2021        INR RESULTS:  Lab Results   Component Value Date    INR 1.26 (H) 11/24/2021    INR 1.31 (H) 11/19/2021    INR 1.39 (H) 09/25/2019       Procedures:      Assessment and Plan:     Permanent atrial fibrillation-ventricular rate well controlled by metoprolol XL 75 mg once daily     It is encouraging that patient's ventricular rate is well controlled by Toprol-XL 75 mg once daily    We will increase amlodipine to 10 mg once daily to provide better blood pressure control..  We will see patient in follow-up in 1 year.  All questions and concerns were addressed and patient and his wife were happy with the plan.    CC  Patient Care Team:  Singh Valencia MD as PCP - General (Family Medicine)  Vanessa Clement MD as Assigned Heart and Vascular Provider  Hiram Covarrubias MD as MD (Critical Care)  Vanessa Clement MD as Referring Physician (Clinical Cardiac Electrophysiology)  Singh Valencia MD as Assigned PCP  Hiram Covarrubias MD as Assigned Pulmonology Provider  SELF, REFERRED       5

## 2024-10-15 LAB
ANION GAP SERPL CALCULATED.3IONS-SCNC: 9 MMOL/L (ref 7–15)
BUN SERPL-MCNC: 7.6 MG/DL (ref 8–23)
CALCIUM SERPL-MCNC: 9.6 MG/DL (ref 8.8–10.4)
CHLORIDE SERPL-SCNC: 102 MMOL/L (ref 98–107)
CREAT SERPL-MCNC: 1.02 MG/DL (ref 0.67–1.17)
EGFRCR SERPLBLD CKD-EPI 2021: 83 ML/MIN/1.73M2
GLUCOSE SERPL-MCNC: 94 MG/DL (ref 70–99)
HCO3 SERPL-SCNC: 27 MMOL/L (ref 22–29)
POTASSIUM SERPL-SCNC: 4.8 MMOL/L (ref 3.4–5.3)
SODIUM SERPL-SCNC: 138 MMOL/L (ref 135–145)

## 2024-10-17 DIAGNOSIS — I10 BENIGN ESSENTIAL HYPERTENSION: ICD-10-CM

## 2024-10-21 ENCOUNTER — MYC MEDICAL ADVICE (OUTPATIENT)
Dept: CARDIOLOGY | Facility: CLINIC | Age: 64
End: 2024-10-21

## 2024-10-22 ENCOUNTER — MYC REFILL (OUTPATIENT)
Dept: CARDIOLOGY | Facility: CLINIC | Age: 64
End: 2024-10-22
Payer: COMMERCIAL

## 2024-10-22 ENCOUNTER — MYC REFILL (OUTPATIENT)
Dept: FAMILY MEDICINE | Facility: CLINIC | Age: 64
End: 2024-10-22
Payer: COMMERCIAL

## 2024-10-22 DIAGNOSIS — I10 BENIGN ESSENTIAL HYPERTENSION: ICD-10-CM

## 2024-10-22 DIAGNOSIS — N52.9 ERECTILE DYSFUNCTION, UNSPECIFIED ERECTILE DYSFUNCTION TYPE: ICD-10-CM

## 2024-10-22 RX ORDER — METOPROLOL SUCCINATE 50 MG/1
75 TABLET, EXTENDED RELEASE ORAL DAILY
Qty: 135 TABLET | Refills: 3 | Status: SHIPPED | OUTPATIENT
Start: 2024-10-22

## 2024-10-23 RX ORDER — METOPROLOL SUCCINATE 50 MG/1
75 TABLET, EXTENDED RELEASE ORAL DAILY
Qty: 135 TABLET | Refills: 1 | OUTPATIENT
Start: 2024-10-23

## 2024-10-23 RX ORDER — SILDENAFIL 100 MG/1
100 TABLET, FILM COATED ORAL DAILY PRN
Qty: 120 TABLET | Refills: 1 | Status: SHIPPED | OUTPATIENT
Start: 2024-10-23

## 2024-10-23 NOTE — TELEPHONE ENCOUNTER
Signed Yesterday (10/22/2024):   metoprolol succinate ER (TOPROL XL) 50 MG 24 hr tablet   Sig: Take 1.5 tablets (75 mg) by mouth daily.   Disp: 135 tablet    Refills: 3   Signed by: Vanessa Clement MD     
independent

## 2024-12-13 ENCOUNTER — MYC REFILL (OUTPATIENT)
Dept: CARDIOLOGY | Facility: CLINIC | Age: 64
End: 2024-12-13
Payer: COMMERCIAL

## 2024-12-13 DIAGNOSIS — I48.21 PERMANENT ATRIAL FIBRILLATION (H): ICD-10-CM

## 2024-12-18 NOTE — TELEPHONE ENCOUNTER
Routed to [facility] inbox on 12/18/24       apixaban ANTICOAGULANT (ELIQUIS ANTICOAGULANT) 5 MG tablet   Last Written Prescription Date:  7/24/24  Last Fill Quantity: 180,   # refills: 1  Last Office Visit : 10/14/24 Vanessa Clement  Future Office visit:  One year       CBC RESULTS:   Vanessa Clement MD  10/16/2024  8:13 PM CDT Back to Top      Test results are acceptable.     Recent Labs   Lab Test 10/14/24  1334   WBC 6.9   RBC 4.69   HGB 15.5   HCT 43.0   MCV 92   MCH 33.0   MCHC 36.0   RDW 13.1   *

## 2025-01-07 ENCOUNTER — TELEPHONE (OUTPATIENT)
Dept: GASTROENTEROLOGY | Facility: CLINIC | Age: 65
End: 2025-01-07
Payer: COMMERCIAL

## 2025-01-07 NOTE — TELEPHONE ENCOUNTER
"Pre Assessment RN Review    Focused Assessments      GABRIEL Hx  Estimated body mass index is 34.59 kg/m  as calculated from the following:    Height as of 7/31/24: 1.803 m (5' 11\").    Weight as of 10/14/24: 112.5 kg (248 lb).     Patient has reported / documented history GABRIEL and reports he does use a a device for sleep.     Device: CPAP    Severity Assessment    No sleep study available for review.      Scheduling Status & Recommendations    Continue scheduling, awaiting clinical input. Message sent to Dr. Hankins and Dr. Anaya.  Patient was previously approved for [facility]    ADDENDUM 01.08.2025: ok for [facility] and [facility] per Dr. Hankins and Dr. Anaya  "

## 2025-01-07 NOTE — TELEPHONE ENCOUNTER
"Endoscopy Scheduling Screen    Have you had any respiratory illness or flu-like symptoms in the last 10 days?  No    What is your communication preference for Instructions and/or Bowel Prep?   MyChart    What insurance is in the chart?  Other:  [facility]    Ordering/Referring Provider: DIONICIO LARES    (If ordering provider performs procedure, schedule with ordering provider unless otherwise instructed. )    BMI: Estimated body mass index is 34.59 kg/m  as calculated from the following:    Height as of 7/31/24: 1.803 m (5' 11\").    Weight as of 10/14/24: 112.5 kg (248 lb).     Sedation Ordered  moderate sedation.   If patient BMI > 50 do not schedule in [facility].    If patient BMI > 45 do not schedule at [facility].    Are you taking methadone or Suboxone?  NO, No RN review required.    Have you been diagnosed and are being treated for severe PTSD or severe anxiety?  NO, No RN review required.    Are you taking any prescription medications for pain 3 or more times per week?   NO, No RN review required.    Do you have a history of malignant hyperthermia?  No    (Females) Are you currently pregnant?        Have you been diagnosed or told you have pulmonary hypertension?   No    Do you have an LVAD?  No    Have you been told you have moderate to severe sleep apnea?  Yes. Do you use a CPAP? Yes Where is the patient located?. (RN Review required for scheduling unless scheduling in Hospital.) 6 yrs ago    Have you been told you have COPD, asthma, or any other lung disease?  No    Do you have any heart conditions?  Yes     In the past year, have you had any hospitalizations for heart related issues including cardiomyopathy, heart attack, or stent placement?  No    Do you have any implantable devices in your body (pacemaker, ICD)?  No    Do you take nitroglycerine?  No    Have you ever had or are you waiting for an organ transplant?  No. Continue scheduling, no site restrictions.    Have you had a stroke or transient ischemic " "attack (TIA aka \"mini stroke\" in the last 6 months?   No    Have you been diagnosed with or been told you have cirrhosis of the liver?   No.    Are you currently on dialysis?   No    Do you need assistance transferring?   No    BMI: Estimated body mass index is 34.59 kg/m  as calculated from the following:    Height as of 7/31/24: 1.803 m (5' 11\").    Weight as of 10/14/24: 112.5 kg (248 lb).     Is patients BMI > 40 and scheduling location [facility]?  No    Do you take an injectable or oral medication for weight loss or diabetes (excluding insulin)?  No    Do you take the medication Naltrexone?  No    Do you take blood thinners?  Yes, you must contact your prescribing provider for directions on holding or bridging with a different medication.       Prep   Are you currently on dialysis or do you have chronic kidney disease?  No    Do you have a diagnosis of diabetes?  No    Do you have a diagnosis of cystic fibrosis (CF)?  No    On a regular basis do you go 3 -5 days between bowel movements?  No    BMI > 40?  No    Preferred Pharmacy:    [id number] - [place] [id number] [facility] [address] [facility]  [address] [address]  Phone: [phone] Fax: [phone]          Final Scheduling Details     Procedure scheduled  Flexible sigmoidoscopy    Surgeon:  ARNAUD     Date of procedure:  3/31/25     Pre-OP / PAC:   No - Not required for this site.    Location  [facility] - [facility] - Patient preference.    Sedation   Moderate Sedation - Per order.      Patient Reminders:   You will receive a call from a Nurse to review instructions and health history.  This assessment must be completed prior to your procedure.  Failure to complete the Nurse assessment may result in the procedure being cancelled.      On the day of your procedure, please designate an adult(s) who can drive you home stay with you for the next 24 hours. The medicines used in the exam will make you sleepy. You will not be " able to drive.      You cannot take public transportation, ride share services, or non-medical taxi service without a responsible caregiver.  Medical transport services are allowed with the requirement that a responsible caregiver will receive you at your destination.  We require that drivers and caregivers are confirmed prior to your procedure.

## 2025-02-13 NOTE — TELEPHONE ENCOUNTER
----- Message from Arlin AMADO sent at 2/12/2025  3:56 PM CST -----  Regarding: BP check per Dr. Lu  Reporting blood pressure reading per Dr. Lu office visit note on 2/5/25.    Blood Pressure today in clinic at 4:00 pm on 2/12/25  is 190/94, right arm, sitting, adult regular cuff. Pulse is 80.   Last Clinic Visit: 5/16/2022  [facility] [place]

## 2025-03-13 ENCOUNTER — TELEPHONE (OUTPATIENT)
Dept: GASTROENTEROLOGY | Facility: CLINIC | Age: 65
End: 2025-03-13
Payer: COMMERCIAL

## 2025-03-13 NOTE — TELEPHONE ENCOUNTER
Pre visit planning completed.      Procedure details:    Patient scheduled for Flexible sigmoidoscopy on 3/31/25.     Approximate arrival time: 0725. Procedure time 0810.   *Ensure patient is aware that endoscopy team will be calling about 2 days prior to procedure date to confirm arrival time as this may change.     Facility location: [facility]; [address], [place] [address]. Check in location: 2nd Floor at Surgery desk.  *Disclaimer: Drivers are to check in with patient and stay on campus during procedure.     Sedation type: Conscious sedation     Pre op exam needed? No.    Indication for procedure: Rectal polyp       Chart review:     Electronic implanted devices? No    Recent diagnosis of diverticulitis within the last 6 weeks? No      Medication review:    Diabetic? No    Anticoagulants? Yes Apixaban (Eliquis): Recommended HOLD 2 days before procedure.  Consult with your managing provider.    Weight loss medication/injectable? No GLP-1 medication per patient's medication list. Nursing to verify with pre-assessment call.    Other medication HOLDING recommendations:  N/A      Prep for procedure:     Bowel prep recommendation: Enemas  Due to: standard bowel prep    Procedure information and instructions sent via [employer]         Alida Chou RN  Endoscopy Procedure Pre Assessment   [phone] option 3

## 2025-03-13 NOTE — TELEPHONE ENCOUNTER
Pre assessment completed for upcoming procedure.   (Please see previous telephone encounter notes for complete details)    I called and spoke with patient      Procedure details:    Approximate time and facility location reviewed.   Patient is aware that endoscopy team will be calling about 2 days prior to confirm arrival time.    Designated  policy reviewed and that site requests drivers to check in and stay on campus. Instructed to have someone stay 6  hours post procedure.   *Disclaimer - please notify the [facility] RN GI staff with any  issues/concerns.    Medication review:    Medications reviewed. Please see supporting documentation below. Holding recommendations discussed (if applicable).       Prep for procedure:     Procedure prep instructions reviewed.        Any additional information needed:  N/A      Patient verbalized understanding and had no questions or concerns at this time.      Sharri Garcia LPN  Endoscopy Procedure Pre Assessment   [phone] option 3

## 2025-03-23 SDOH — HEALTH STABILITY: PHYSICAL HEALTH: ON AVERAGE, HOW MANY DAYS PER WEEK DO YOU ENGAGE IN MODERATE TO STRENUOUS EXERCISE (LIKE A BRISK WALK)?: 2 DAYS

## 2025-03-23 SDOH — HEALTH STABILITY: PHYSICAL HEALTH: ON AVERAGE, HOW MANY MINUTES DO YOU ENGAGE IN EXERCISE AT THIS LEVEL?: 20 MIN

## 2025-03-23 ASSESSMENT — SOCIAL DETERMINANTS OF HEALTH (SDOH): HOW OFTEN DO YOU GET TOGETHER WITH FRIENDS OR RELATIVES?: MORE THAN THREE TIMES A WEEK

## 2025-03-26 ENCOUNTER — OFFICE VISIT (OUTPATIENT)
Dept: FAMILY MEDICINE | Facility: CLINIC | Age: 65
End: 2025-03-26
Attending: FAMILY MEDICINE
Payer: COMMERCIAL

## 2025-03-26 ENCOUNTER — MYC MEDICAL ADVICE (OUTPATIENT)
Dept: CARDIOLOGY | Facility: CLINIC | Age: 65
End: 2025-03-26

## 2025-03-26 VITALS
BODY MASS INDEX: 36.96 KG/M2 | OXYGEN SATURATION: 99 % | SYSTOLIC BLOOD PRESSURE: 120 MMHG | TEMPERATURE: 98.2 F | HEIGHT: 71 IN | DIASTOLIC BLOOD PRESSURE: 80 MMHG | WEIGHT: 264 LBS | RESPIRATION RATE: 20 BRPM | HEART RATE: 70 BPM

## 2025-03-26 DIAGNOSIS — Z00.00 ROUTINE GENERAL MEDICAL EXAMINATION AT A HEALTH CARE FACILITY: Primary | ICD-10-CM

## 2025-03-26 DIAGNOSIS — E78.00 PURE HYPERCHOLESTEROLEMIA: ICD-10-CM

## 2025-03-26 DIAGNOSIS — I48.91 NEW ONSET ATRIAL FIBRILLATION (H): ICD-10-CM

## 2025-03-26 PROCEDURE — 99396 PREV VISIT EST AGE 40-64: CPT | Performed by: FAMILY MEDICINE

## 2025-03-26 PROCEDURE — 1126F AMNT PAIN NOTED NONE PRSNT: CPT | Performed by: FAMILY MEDICINE

## 2025-03-26 PROCEDURE — 3079F DIAST BP 80-89 MM HG: CPT | Performed by: FAMILY MEDICINE

## 2025-03-26 PROCEDURE — 3074F SYST BP LT 130 MM HG: CPT | Performed by: FAMILY MEDICINE

## 2025-03-26 ASSESSMENT — PAIN SCALES - GENERAL: PAINLEVEL_OUTOF10: NO PAIN (0)

## 2025-03-26 NOTE — PATIENT INSTRUCTIONS
Patient Education   Preventive Care Advice   This is general advice given by our system to help you stay healthy. However, your care team may have specific advice just for you. Please talk to your care team about your preventive care needs.  Nutrition  Eat 5 or more servings of fruits and vegetables each day.  Try wheat bread, brown rice and whole grain pasta (instead of white bread, rice, and pasta).  Get enough calcium and vitamin D. Check the label on foods and aim for 100% of the RDA (recommended daily allowance).  Lifestyle  Exercise at least 150 minutes each week  (30 minutes a day, 5 days a week).  Do muscle strengthening activities 2 days a week. These help control your weight and prevent disease.  No smoking.  Wear sunscreen to prevent skin cancer.  Have a dental exam and cleaning every 6 months.  Yearly exams  See your health care team every year to talk about:  Any changes in your health.  Any medicines your care team has prescribed.  Preventive care, family planning, and ways to prevent chronic diseases.  Shots (vaccines)   HPV shots (up to age 26), if you've never had them before.  Hepatitis B shots (up to age 59), if you've never had them before.  COVID-19 shot: Get this shot when it's due.  Flu shot: Get a flu shot every year.  Tetanus shot: Get a tetanus shot every 10 years.  Pneumococcal, hepatitis A, and RSV shots: Ask your care team if you need these based on your risk.  Shingles shot (for age 50 and up)  General health tests  Diabetes screening:  Starting at age 35, Get screened for diabetes at least every 3 years.  If you are younger than age 35, ask your care team if you should be screened for diabetes.  Cholesterol test: At age 39, start having a cholesterol test every 5 years, or more often if advised.  Bone density scan (DEXA): At age 50, ask your care team if you should have this scan for osteoporosis (brittle bones).  Hepatitis C: Get tested at least once in your life.  STIs (sexually  transmitted infections)  Before age 24: Ask your care team if you should be screened for STIs.  After age 24: Get screened for STIs if you're at risk. You are at risk for STIs (including HIV) if:  You are sexually active with more than one person.  You don't use condoms every time.  You or a partner was diagnosed with a sexually transmitted infection.  If you are at risk for HIV, ask about PrEP medicine to prevent HIV.  Get tested for HIV at least once in your life, whether you are at risk for HIV or not.  Cancer screening tests  Cervical cancer screening: If you have a cervix, begin getting regular cervical cancer screening tests starting at age 21.  Breast cancer scan (mammogram): If you've ever had breasts, begin having regular mammograms starting at age 40. This is a scan to check for breast cancer.  Colon cancer screening: It is important to start screening for colon cancer at age 45.  Have a colonoscopy test every 10 years (or more often if you're at risk) Or, ask your provider about stool tests like a FIT test every year or Cologuard test every 3 years.  To learn more about your testing options, visit:   .  For help making a decision, visit:   https://bit.ly/am38501.  Prostate cancer screening test: If you have a prostate, ask your care team if a prostate cancer screening test (PSA) at age 55 is right for you.  Lung cancer screening: If you are a current or former smoker ages 50 to 80, ask your care team if ongoing lung cancer screenings are right for you.  For informational purposes only. Not to replace the advice of your health care provider. Copyright   2023 [facility]. All rights reserved. Clinically reviewed by the [facility]. [employer] / TLL 639960 - REV 01/24.  9 Ways to Cut Back on Drinking  Maybe you've found yourself drinking more alcohol than you'd prefer. If you want to cut back, here are some ideas to try.    Think before you drink.  Do you really want a drink,  "or is it just a habit? If you're used to having a drink at a certain time, try doing something else then.     Look for substitutes.  Find some no-alcohol drinks that you enjoy, like flavored seltzer water, tea with honey, or tonic with a slice of lime. Or try alcohol-free beer or \"virgin\" cocktails (without the alcohol).     Drink more water.  Use water to quench your thirst. Drink a glass of water before you have any alcohol. Have another glass along with every drink or between drinks.     Shrink your drink.  For example, have a bottle of beer instead of a pint. Use a smaller glass for wine. Choose drinks with lower alcohol content (ABV%). Or use less liquor and more mixer in cocktails.     Slow down.  It's easy to drink quickly and without thinking about it. Pay attention, and make each drink last longer.     Do the math.  Total up how much you spend on alcohol each month. How much is that a year? If you cut back, what could you do with the money you save?     Take a break.  Choose a day or two each week when you won't drink at all. Notice how you feel on those days, physically and emotionally. How did you sleep? Do you feel better? Over time, add more break days.     Count calories.  Would you like to lose some weight? For some people that's a good motivator for cutting back. Figure out how many calories are in each drink. How many does that add up to in a day? In a week? In a month?     Practice saying no.  Be ready when someone offers you a drink. Try: \"Thanks, I've had enough.\" Or \"Thanks, but I'm cutting back.\" Or \"No, thanks. I feel better when I drink less.\"   Current as of: August 20, 2024  Content Version: 14.4 2024-2025 [employer].   Care instructions adapted under license by your healthcare professional. If you have questions about a medical condition or this instruction, always ask your healthcare professional. [employer] disclaims any warranty or liability for your use of " this information.  Substance Use Disorder: Care Instructions  Overview     You can improve your life and health by stopping your use of alcohol or drugs. When you don't drink or use drugs, you may feel and sleep better. You may get along better with your family, friends, and coworkers. There are medicines and programs that can help with substance use disorder.  How can you care for yourself at home?  Here are some ways to help you stay sober and prevent relapse.  If you have been given medicine to help keep you sober or reduce your cravings, be sure to take it exactly as prescribed.  Talk to your doctor about programs that can help you stop using drugs or drinking alcohol.  Do not keep alcohol or drugs in your home.  Plan ahead. Think about what you'll say if other people ask you to drink or use drugs. Try not to spend time with people who drink or use drugs.  Use the time and money spent on drinking or drugs to do something that's important to you.  Preventing a relapse  Have a plan to deal with relapse. Learn to recognize changes in your thinking that lead you to drink or use drugs. Get help before you start to drink or use drugs again.  Try to stay away from situations, friends, or places that may lead you to drink or use drugs.  If you feel the need to drink alcohol or use drugs again, seek help right away. Call a trusted friend or family member. Some people get support from organizations such as Narcotics Anonymous or obiwon or from treatment facilities.  If you relapse, get help as soon as you can. Some people make a plan with another person that outlines what they want that person to do for them if they relapse. The plan usually includes how to handle the relapse and who to notify in case of relapse.  Don't give up. Remember that a relapse doesn't mean that you have failed. Use the experience to learn the triggers that lead you to drink or use drugs. Then quit again. Recovery is a lifelong process.  "Many people have several relapses before they are able to quit for good.  Follow-up care is a key part of your treatment and safety. Be sure to make and go to all appointments, and call your doctor if you are having problems. It's also a good idea to know your test results and keep a list of the medicines you take.  When should you call for help?   Call 911  anytime you think you may need emergency care. For example, call if you or someone else:    Has overdosed or has withdrawal signs. Be sure to tell the emergency workers that you are or someone else is using or trying to quit using drugs. Overdose or withdrawal signs may include:  Losing consciousness.  Seizure.  Seeing or hearing things that aren't there (hallucinations).     Is thinking or talking about suicide or harming others.   Where to get help 24 hours a day, 7 days a week   If you or someone you know talks about suicide, self-harm, a mental health crisis, a substance use crisis, or any other kind of emotional distress, get help right away. You can:    Call the Suicide and Crisis Lifeline at 988.     Call 9-855-177-JQYW (1-843.722.1657).     Text HOME to 029613 to access the Crisis Text Line.   Consider saving these numbers in your phone.  Go to [employer] for more information or to chat online.  Call your doctor now or seek immediate medical care if:    You are having withdrawal symptoms. These may include nausea or vomiting, sweating, shakiness, and anxiety.   Watch closely for changes in your health, and be sure to contact your doctor if:    You have a relapse.     You need more help or support to stop.   Where can you learn more?  Go to https://www.healthZee[employer].net/patiented  Enter H573 in the search box to learn more about \"Substance Use Disorder: Care Instructions.\"  Current as of: August 20, 2024  Content Version: 14.4    3438-9363 [employer].   Care instructions adapted under license by your healthcare professional. If you have " questions about a medical condition or this instruction, always ask your healthcare professional. [employer] disclaims any warranty or liability for your use of this information.

## 2025-03-26 NOTE — PROGRESS NOTES
Preventive Care Visit  [facility]  Singh Valencia MD, Family Medicine  Mar 26, 2025        ICD-10-CM    1. Routine general medical examination at a health care facility  Z00.00       2. Pure hypercholesterolemia  E78.00 Lipid panel reflex to direct LDL Non-fasting       PLAN:needs blood drawn fasting     Subjective   Chandu is a 64 year old, presenting for the following:  Physical and Ear Problem (Hearing loss, Possible cerumen buildup )        3/26/2025     4:21 PM   Additional Questions   Roomed by Kirby MONTALVO LPN   Accompanied by Self         3/26/2025     4:21 PM   Patient Reported Additional Medications   Patient reports taking the following new medications None     Patient also would like to discuss phlegm in throat which has not subsided in last month and a noticeable change in hearing which may be due to ear wax buildup       HPI  No concerns        Advance Care Planning  Patient does not have a Health Care Directive: Discussed advance care planning with patient; information given to patient to review.      3/23/2025   General Health   How would you rate your overall physical health? Good   Feel stress (tense, anxious, or unable to sleep) Only a little   (!) STRESS CONCERN      3/23/2025   Nutrition   Three or more servings of calcium each day? (!) NO   Diet: Regular (no restrictions)   How many servings of fruit and vegetables per day? (!) 0-1   How many sweetened beverages each day? 0-1         3/23/2025   Exercise   Days per week of moderate/strenous exercise 2 days   Average minutes spent exercising at this level 20 min   (!) EXERCISE CONCERN      3/23/2025   Social Factors   Frequency of gathering with friends or relatives More than three times a week   Worry food won't last until get money to buy more No   Food not last or not have enough money for food? No   Do you have housing? (Housing is defined as stable permanent housing and does not include staying ouside in a car, in a  tent, in an abandoned building, in an overnight shelter, or couch-surfing.) Yes   Are you worried about losing your housing? No   Lack of transportation? No   Unable to get utilities (heat,electricity)? No         3/23/2025   Fall Risk   Fallen 2 or more times in the past year? No   Trouble with walking or balance? No          3/23/2025   Dental   Dentist two times every year? (!) NO           3/17/2024   TB Screening   Were you born outside of the [place]? No           Today's PHQ-2 Score:       3/26/2025     4:11 PM   PHQ-2 ( 1999 [employer])   Q1: Little interest or pleasure in doing things 0   Q2: Feeling down, depressed or hopeless 0   PHQ-2 Score 0    Q1: Little interest or pleasure in doing things Not at all   Q2: Feeling down, depressed or hopeless Not at all   PHQ-2 Score 0       Patient-reported           3/23/2025   Substance Use   Alcohol more than 3/day or more than 7/wk Yes   How often do you have a drink containing alcohol 2 to 3 times a week   How many alcohol drinks on typical day 3 or 4   How often do you have 5+ drinks at one occasion Monthly   Audit 2/3 Score 3   How often not able to stop drinking once started Never   How often failed to do what normally expected Never   How often needed first drink in am after a heavy drinking session Never   How often feeling of guilt or remorse after drinking Never   How often unable to remember what happened the night before Never   Have you or someone else been injured because of your drinking No   Has anyone been concerned or suggested you cut down on drinking No   TOTAL SCORE - AUDIT 6   Do you use any other substances recreationally? (!) CANNABIS PRODUCTS     Social History     Tobacco Use    Smoking status: Former     Types: Cigars    Smokeless tobacco: Never    Tobacco comments:     occasional cigar   Vaping Use    Vaping status: Never Used   Substance Use Topics    Alcohol use: Yes     Comment: occas     Drug use: Yes     Types: Marijuana     Comment: occas   "          3/23/2025   STI Screening   New sexual partner(s) since last STI/HIV test? No   Last PSA:   PSA   Date Value Ref Range Status   11/15/2019 2.07 0 - 4 ug/L Final     Comment:     Assay Method:  Chemiluminescence using Siemens Vista analyzer     Prostate Specific Antigen Screen   Date Value Ref Range Status   02/22/2023 4.18 (H) 0.00 - 4.00 ug/L Final     ASCVD Risk   The 10-year ASCVD risk score (Boone WEBB, et al., 2019) is: 11%    Values used to calculate the score:      Age: 64 years      Sex: Male      Is Non- : No      Diabetic: No      Tobacco smoker: No      Systolic Blood Pressure: 120 mmHg      Is BP treated: Yes      HDL Cholesterol: 53 mg/dL      Total Cholesterol: 199 mg/dL           Reviewed and updated as needed this visit by Provider                      Review of Systems  CONSTITUTIONAL: NEGATIVE for fever, chills, change in weight  INTEGUMENTARY/SKIN: NEGATIVE for worrisome rashes, moles or lesions  EYES: NEGATIVE for vision changes or irritation  ENT/MOUTH: NEGATIVE for ear, mouth and throat problems  RESP: NEGATIVE for significant cough or SOB  BREAST: NEGATIVE for masses, tenderness or discharge  CV: NEGATIVE for chest pain, palpitations or peripheral edema  GI: NEGATIVE for nausea, abdominal pain, heartburn, or change in bowel habits  : NEGATIVE for frequency, dysuria, or hematuria  MUSCULOSKELETAL: NEGATIVE for significant arthralgias or myalgia  NEURO: NEGATIVE for weakness, dizziness or paresthesias  ENDOCRINE: NEGATIVE for temperature intolerance, skin/hair changes  HEME: NEGATIVE for bleeding problems  PSYCHIATRIC: NEGATIVE for changes in mood or affect     Objective    Exam  /80   Pulse 70   Temp 98.2  F (36.8  C) (Tympanic)   Resp 20   Ht 1.803 m (5' 11\")   Wt 119.7 kg (264 lb)   SpO2 99%   BMI 36.82 kg/m     Estimated body mass index is 36.82 kg/m  as calculated from the following:    Height as of this encounter: 1.803 m (5' 11\").   "  Weight as of this encounter: 119.7 kg (264 lb).    Physical Exam  GENERAL: alert and no distress  EYES: Eyes grossly normal to inspection, PERRL and conjunctivae and sclerae normal  HENT: ear canals and TM's normal, nose and mouth without ulcers or lesions  NECK: no adenopathy, no asymmetry, masses, or scars  RESP: lungs clear to auscultation - no rales, rhonchi or wheezes  CV: irregular rate and rhythm, normal S1 S2, no S3 or S4, no murmur, click or rub, no peripheral edema  ABDOMEN: soft, nontender, no hepatosplenomegaly, no masses and bowel sounds normal  MS: no gross musculoskeletal defects noted, no edema  SKIN: no suspicious lesions or rashes  NEURO: Normal strength and tone, mentation intact and speech normal  PSYCH: mentation appears normal, affect normal/bright        Signed Electronically by: Singh Valencia MD

## 2025-03-27 ENCOUNTER — TELEPHONE (OUTPATIENT)
Dept: GASTROENTEROLOGY | Facility: CLINIC | Age: 65
End: 2025-03-27
Payer: COMMERCIAL

## 2025-03-27 NOTE — TELEPHONE ENCOUNTER
Left voicemail of arrival time of 7:25 AM.     [employer] message sent with updated arrival time.

## 2025-03-28 ENCOUNTER — ANESTHESIA EVENT (OUTPATIENT)
Dept: SURGERY | Facility: AMBULATORY SURGERY CENTER | Age: 65
End: 2025-03-28
Payer: COMMERCIAL

## 2025-03-28 ASSESSMENT — ENCOUNTER SYMPTOMS: DYSRHYTHMIAS: 1

## 2025-03-28 NOTE — ANESTHESIA PREPROCEDURE EVALUATION
Anesthesia Pre-Procedure Evaluation    Patient: Augusto Meeks   MRN: [id number] : 1960        Procedure : Procedure(s):  Sigmoidoscopy flexible          Past Medical History:   Diagnosis Date     ()     Antiplatelet or antithrombotic long-term use     Arrhythmia     Hypertension     Irregular heart beat     Sleep apnea       Past Surgical History:   Procedure Laterality Date    ANESTHESIA CARDIOVERSION N/A 2019    Procedure: Anesthesia Coverage Transesophageal Echocardiogram, Cardioversion @0930;  Surgeon: GENERIC ANESTHESIA PROVIDER;  Location: [facility]    ANESTHESIA CARDIOVERSION N/A 10/30/2019    Procedure: ANESTHESIA, FOR CARDIOVERSION @1100;  Surgeon: GENERIC ANESTHESIA PROVIDER;  Location: [facility]    ANESTHESIA CARDIOVERSION N/A 2019    Procedure: CARDIOVERSION;  Surgeon: GENERIC ANESTHESIA PROVIDER;  Location: [facility]    ANESTHESIA CARDIOVERSION N/A 2021    Procedure: ANESTHESIA, FOR CARDIOVERSION @1330;  Surgeon: GENERIC ANESTHESIA PROVIDER;  [facility]    CARDIAC SURGERY  21    Ablaution    CARDIOVERSION  10/30/2019    Patient reported he had a cardioverson on 2019.    COLONOSCOPY N/A 2024    Procedure: COLONOSCOPY, FLEXIBLE, WITH LESION REMOVAL USING SNARE;  Surgeon: Aakash Anaya MD;  Location: [facility]    COLONOSCOPY WITH CO2 INSUFFLATION N/A 2024    Procedure: Colonoscopy with CO2 insufflation;  Surgeon: Aakash Anaya MD;  Location: [facility]    EP ABLATION FOCAL AFIB N/A 2021    Procedure: EP ABLATION FOCAL AFIB;  Surgeon: Vanessa Clement MD;  [facility]    EP ABLATION FOCAL AFIB N/A 2021    Procedure: EP ABLATION FOCAL AFIB W/INTRA OP DOT;  Surgeon: Prashanth Polanco MD;  Location:  [facility]    VASECTOMY        Allergies   Allergen Reactions    Lisinopril Cough      Social History     Tobacco Use    Smoking status: Former     Types: Cigars    Smokeless tobacco: Never     Tobacco comments:     occasional cigar   Substance Use Topics    Alcohol use: Yes     Comment: occas       Wt Readings from Last 1 Encounters:   03/26/25 119.7 kg (264 lb)        Anesthesia Evaluation   Pt has had prior anesthetic.     No history of anesthetic complications       ROS/MED HX  ENT/Pulmonary:     (+) sleep apnea, uses CPAP,                                      Neurologic:  - neg neurologic ROS     Cardiovascular:     (+) Dyslipidemia hypertension- -   -  - -   Taking blood thinners                     dysrhythmias, a-fib,             METS/Exercise Tolerance:     Hematologic:  - neg hematologic  ROS     Musculoskeletal:  - neg musculoskeletal ROS     GI/Hepatic:  - neg GI/hepatic ROS     Renal/Genitourinary:  - neg Renal ROS     Endo:     (+)               Obesity,       Psychiatric/Substance Use:     (+)     Recreational drug usage: Cannabis.    Infectious Disease:  - neg infectious disease ROS     Malignancy:  - neg malignancy ROS     Other:  - neg other ROS          Physical Exam    Airway        Mallampati: II   TM distance: > 3 FB   Neck ROM: full   Mouth opening: > 3 cm    Respiratory Devices and Support         Dental       (+) Modest Abnormalities - crowns, retainers, 1 or 2 missing teeth      Cardiovascular   cardiovascular exam normal          Pulmonary   pulmonary exam normal                OUTSIDE LABS:  CBC:   Lab Results   Component Value Date    WBC 6.9 10/14/2024    WBC 6.4 09/26/2023    HGB 15.5 10/14/2024    HGB 15.7 09/26/2023    HCT 43.0 10/14/2024    HCT 43.3 09/26/2023     (L) 10/14/2024     (L) 09/26/2023     BMP:   Lab Results   Component Value Date     10/14/2024     09/26/2023    POTASSIUM 4.8 10/14/2024    POTASSIUM 5.0 09/26/2023    CHLORIDE 102 10/14/2024    CHLORIDE 103 09/26/2023    CO2 27 10/14/2024    CO2 25 09/26/2023    BUN 7.6 (L) 10/14/2024    BUN 13.0 09/26/2023    CR 1.02 10/14/2024    CR 1.04 09/26/2023    GLC 94 10/14/2024     (H)  "09/26/2023     COAGS:   Lab Results   Component Value Date    INR 1.26 (H) 11/24/2021     POC:   Lab Results   Component Value Date     (H) 01/22/2021     HEPATIC:   Lab Results   Component Value Date    ALBUMIN 4.3 02/22/2023    PROTTOTAL 8.1 02/22/2023    ALT 41 02/22/2023    AST 25 02/22/2023    ALKPHOS 73 02/22/2023    BILITOTAL 1.2 02/22/2023     OTHER:   Lab Results   Component Value Date    A1C 4.8 11/15/2019    CHALINO 9.6 10/14/2024    MAG 2.2 05/21/2021    TSH 2.25 11/15/2019    T4 1.06 11/15/2019       Anesthesia Plan    ASA Status:  3    NPO Status:  NPO Appropriate    Anesthesia Type: MAC.     - Reason for MAC: immobility needed              Consents    Anesthesia Plan(s) and associated risks, benefits, and realistic alternatives discussed. Questions answered and patient/representative(s) expressed understanding.     - Discussed: Risks, Benefits and Alternatives for BOTH SEDATION and the PROCEDURE were discussed     - Discussed with:  Patient      - Extended Intubation/Ventilatory Support Discussed: No.      - Patient is DNR/DNI Status: No     Use of blood products discussed: No .     Postoperative Care    Pain management: IV analgesics.   PONV prophylaxis: Ondansetron (or other 5HT-3), Background Propofol Infusion     Comments:               Orlando Marquez MD    Clinically Significant Risk Factors Present on Admission                   # Hypertension: Noted on problem list           # Obesity: Estimated body mass index is 36.82 kg/m  as calculated from the following:    Height as of 3/26/25: 1.803 m (5' 11\").    Weight as of 3/26/25: 119.7 kg (264 lb).                "

## 2025-03-31 ENCOUNTER — ANESTHESIA (OUTPATIENT)
Dept: SURGERY | Facility: AMBULATORY SURGERY CENTER | Age: 65
End: 2025-03-31
Payer: COMMERCIAL

## 2025-03-31 ENCOUNTER — HOSPITAL ENCOUNTER (OUTPATIENT)
Facility: AMBULATORY SURGERY CENTER | Age: 65
Discharge: HOME | End: 2025-03-31
Attending: INTERNAL MEDICINE
Payer: COMMERCIAL

## 2025-03-31 VITALS
WEIGHT: 264 LBS | RESPIRATION RATE: 16 BRPM | OXYGEN SATURATION: 98 % | TEMPERATURE: 98.1 F | BODY MASS INDEX: 36.82 KG/M2 | DIASTOLIC BLOOD PRESSURE: 65 MMHG | HEART RATE: 65 BPM | SYSTOLIC BLOOD PRESSURE: 107 MMHG

## 2025-03-31 VITALS — HEART RATE: 67 BPM

## 2025-03-31 LAB — FLEXIBLE SIGMOIDOSCOPY: NORMAL

## 2025-03-31 PROCEDURE — 45331 SIGMOIDOSCOPY AND BIOPSY: CPT | Mod: XS,PT

## 2025-03-31 PROCEDURE — 88305 TISSUE EXAM BY PATHOLOGIST: CPT | Performed by: PATHOLOGY

## 2025-03-31 PROCEDURE — G8918 PT W/O PREOP ORDER IV AB PRO: HCPCS

## 2025-03-31 PROCEDURE — G8907 PT DOC NO EVENTS ON DISCHARG: HCPCS

## 2025-03-31 PROCEDURE — 45338 SIGMOIDOSCOPY W/TUMR REMOVE: CPT | Mod: PT

## 2025-03-31 RX ORDER — PROPOFOL 10 MG/ML
INJECTION, EMULSION INTRAVENOUS CONTINUOUS PRN
Status: DISCONTINUED | OUTPATIENT
Start: 2025-03-31 | End: 2025-03-31

## 2025-03-31 RX ORDER — LABETALOL HYDROCHLORIDE 5 MG/ML
10 INJECTION, SOLUTION INTRAVENOUS
Status: DISCONTINUED | OUTPATIENT
Start: 2025-03-31 | End: 2025-04-01 | Stop reason: HOSPADM

## 2025-03-31 RX ORDER — ACETAMINOPHEN 325 MG/1
975 TABLET ORAL ONCE
Status: DISCONTINUED | OUTPATIENT
Start: 2025-03-31 | End: 2025-04-01 | Stop reason: HOSPADM

## 2025-03-31 RX ORDER — ONDANSETRON 2 MG/ML
4 INJECTION INTRAMUSCULAR; INTRAVENOUS EVERY 30 MIN PRN
Status: DISCONTINUED | OUTPATIENT
Start: 2025-03-31 | End: 2025-04-01 | Stop reason: HOSPADM

## 2025-03-31 RX ORDER — LIDOCAINE HYDROCHLORIDE 20 MG/ML
INJECTION, SOLUTION INFILTRATION; PERINEURAL PRN
Status: DISCONTINUED | OUTPATIENT
Start: 2025-03-31 | End: 2025-03-31

## 2025-03-31 RX ORDER — NALOXONE HYDROCHLORIDE 0.4 MG/ML
0.1 INJECTION, SOLUTION INTRAMUSCULAR; INTRAVENOUS; SUBCUTANEOUS
Status: DISCONTINUED | OUTPATIENT
Start: 2025-03-31 | End: 2025-04-01 | Stop reason: HOSPADM

## 2025-03-31 RX ORDER — OXYCODONE HYDROCHLORIDE 5 MG/1
10 TABLET ORAL
Status: DISCONTINUED | OUTPATIENT
Start: 2025-03-31 | End: 2025-04-01 | Stop reason: HOSPADM

## 2025-03-31 RX ORDER — SODIUM CHLORIDE, SODIUM LACTATE, POTASSIUM CHLORIDE, CALCIUM CHLORIDE 600; 310; 30; 20 MG/100ML; MG/100ML; MG/100ML; MG/100ML
INJECTION, SOLUTION INTRAVENOUS CONTINUOUS
Status: DISCONTINUED | OUTPATIENT
Start: 2025-03-31 | End: 2025-04-01 | Stop reason: HOSPADM

## 2025-03-31 RX ORDER — OXYCODONE HYDROCHLORIDE 5 MG/1
5 TABLET ORAL
Status: DISCONTINUED | OUTPATIENT
Start: 2025-03-31 | End: 2025-04-01 | Stop reason: HOSPADM

## 2025-03-31 RX ORDER — DEXAMETHASONE SODIUM PHOSPHATE 4 MG/ML
4 INJECTION, SOLUTION INTRA-ARTICULAR; INTRALESIONAL; INTRAMUSCULAR; INTRAVENOUS; SOFT TISSUE
Status: DISCONTINUED | OUTPATIENT
Start: 2025-03-31 | End: 2025-04-01 | Stop reason: HOSPADM

## 2025-03-31 RX ORDER — HYDRALAZINE HYDROCHLORIDE 20 MG/ML
2.5-5 INJECTION INTRAMUSCULAR; INTRAVENOUS EVERY 10 MIN PRN
Status: DISCONTINUED | OUTPATIENT
Start: 2025-03-31 | End: 2025-04-01 | Stop reason: HOSPADM

## 2025-03-31 RX ORDER — ONDANSETRON 4 MG/1
4 TABLET, ORALLY DISINTEGRATING ORAL EVERY 30 MIN PRN
Status: DISCONTINUED | OUTPATIENT
Start: 2025-03-31 | End: 2025-04-01 | Stop reason: HOSPADM

## 2025-03-31 RX ORDER — LIDOCAINE 40 MG/G
CREAM TOPICAL
Status: DISCONTINUED | OUTPATIENT
Start: 2025-03-31 | End: 2025-04-01 | Stop reason: HOSPADM

## 2025-03-31 RX ADMIN — PROPOFOL 150 MCG/KG/MIN: 10 INJECTION, EMULSION INTRAVENOUS at 08:16

## 2025-03-31 RX ADMIN — LIDOCAINE HYDROCHLORIDE 80 MG: 20 INJECTION, SOLUTION INFILTRATION; PERINEURAL at 08:16

## 2025-03-31 RX ADMIN — SODIUM CHLORIDE, SODIUM LACTATE, POTASSIUM CHLORIDE, CALCIUM CHLORIDE: 600; 310; 30; 20 INJECTION, SOLUTION INTRAVENOUS at 07:32

## 2025-03-31 RX ADMIN — PROPOFOL 100 MG: 10 INJECTION, EMULSION INTRAVENOUS at 08:19

## 2025-03-31 NOTE — ANESTHESIA CARE TRANSFER NOTE
Patient: Augusto Meeks    Procedure: Procedure(s):  Sigmoidoscopy flexible  SIGMOIDOSCOPY, FLEXIBLE, WITH BIOPSY       Diagnosis: Rectal polyp [K62.1]  Diagnosis Additional Information: No value filed.    Anesthesia Type:   MAC     Note:    Oropharynx: oropharynx clear of all foreign objects and spontaneously breathing  Level of Consciousness: drowsy  Oxygen Supplementation: nasal cannula  Level of Supplemental Oxygen (L/min / FiO2): 2  Independent Airway: airway patency satisfactory and stable  Dentition: dentition unchanged  Vital Signs Stable: post-procedure vital signs reviewed and stable  Report to RN Given: handoff report given  Patient transferred to: Phase II    Handoff Report: Identifed the Patient, Identified the Reponsible Provider, Reviewed the pertinent medical history, Discussed the surgical course, Reviewed Intra-OP anesthesia mangement and issues during anesthesia, Set expectations for post-procedure period and Allowed opportunity for questions and acknowledgement of understanding      Vitals:  Vitals Value Taken Time   BP     Temp     Pulse 67 03/31/25 0827   Resp     SpO2         Electronically Signed By: JELANI Flores CRNA  March 31, 2025  8:35 AM

## 2025-03-31 NOTE — ANESTHESIA POSTPROCEDURE EVALUATION
Patient: Augusto Meeks    Procedure: Procedure(s):  Sigmoidoscopy flexible  SIGMOIDOSCOPY, FLEXIBLE, WITH BIOPSY       Anesthesia Type:  MAC    Note:  Disposition: Outpatient   Postop Pain Control: Uneventful            Sign Out: Well controlled pain   PONV: No   Neuro/Psych: Uneventful            Sign Out: Acceptable/Baseline neuro status   Airway/Respiratory: Uneventful            Sign Out: Acceptable/Baseline resp. status   CV/Hemodynamics: Uneventful            Sign Out: Acceptable CV status; No obvious hypovolemia; No obvious fluid overload   Other NRE: NONE   DID A NON-ROUTINE EVENT OCCUR?            Last vitals:  Vitals Value Taken Time   /65 03/31/25 0901   Temp 98.1  F (36.7  C) 03/31/25 0838   Pulse 65 03/31/25 0901   Resp 16 03/31/25 0901   SpO2 98 % 03/31/25 0901       Electronically Signed By: Orlando Marquez MD  March 31, 2025  11:08 AM

## 2025-03-31 NOTE — H&P
ENDOSCOPY PRE-SEDATION H&P FOR OUTPATIENT PROCEDURES    Augusto Meeks  [id number]  1960    Procedure: colonoscopy    Pre-procedure diagnosis: hx polyp    Past medical history:   Past Medical History:   Diagnosis Date    A-fib (H) 2019    Antiplatelet or antithrombotic long-term use     Arrhythmia     Hypertension 1/20    Irregular heart beat     Sleep apnea        Past surgical history:   Past Surgical History:   Procedure Laterality Date    ANESTHESIA CARDIOVERSION N/A 9/25/2019    Procedure: Anesthesia Coverage Transesophageal Echocardiogram, Cardioversion @0930;  Surgeon: GENERIC ANESTHESIA PROVIDER;  Location: [facility]    ANESTHESIA CARDIOVERSION N/A 10/30/2019    Procedure: ANESTHESIA, FOR CARDIOVERSION @1100;  Surgeon: GENERIC ANESTHESIA PROVIDER;  Location: [facility]    ANESTHESIA CARDIOVERSION N/A 12/5/2019    Procedure: CARDIOVERSION;  Surgeon: GENERIC ANESTHESIA PROVIDER;  Location: [facility]    ANESTHESIA CARDIOVERSION N/A 5/21/2021    Procedure: ANESTHESIA, FOR CARDIOVERSION @1330;  Surgeon: GENERIC ANESTHESIA PROVIDER;  Location: [facility]    CARDIAC SURGERY  2/22/21    Ablaution    CARDIOVERSION  10/30/2019    Patient reported he had a cardioverson on 09/25/2019.    COLONOSCOPY N/A 9/30/2024    Procedure: COLONOSCOPY, FLEXIBLE, WITH LESION REMOVAL USING SNARE;  Surgeon: Aakash Anaya MD;  Location: [facility]    COLONOSCOPY WITH CO2 INSUFFLATION N/A 9/30/2024    Procedure: Colonoscopy with CO2 insufflation;  Surgeon: Aakash Anaya MD;  Location: [facility]    EP ABLATION FOCAL AFIB N/A 1/22/2021    Procedure: EP ABLATION FOCAL AFIB;  Surgeon: Vanessa Clement MD;  [facility]    EP ABLATION FOCAL AFIB N/A 11/24/2021    Procedure: EP ABLATION FOCAL AFIB W/INTRA OP DOT;  Surgeon: Prashanth Polanco MD;  [facility]    VASECTOMY         Current Outpatient Medications   Medication Sig Dispense Refill    amLODIPine (NORVASC) 10 MG tablet Take 1 tablet (10  mg) by mouth daily. 90 tablet 3    apixaban ANTICOAGULANT (ELIQUIS ANTICOAGULANT) 5 MG tablet Take 1 tablet (5 mg) by mouth 2 times daily. 180 tablet 2    metoprolol succinate ER (TOPROL XL) 50 MG 24 hr tablet Take 1.5 tablets (75 mg) by mouth daily. 135 tablet 3    sildenafil (VIAGRA) 100 MG tablet Take 1 tablet (100 mg) by mouth daily as needed. 120 tablet 1     Current Facility-Administered Medications   Medication Dose Route Frequency Provider Last Rate Last Admin    acetaminophen (TYLENOL) tablet 975 mg  975 mg Oral Once Orlando Marquez MD        lactated ringers infusion   Intravenous Continuous Orlando Marquez  mL/hr at 03/31/25 0732 New Bag at 03/31/25 0732    lidocaine (LMX4) kit   Topical Q1H PRN Orlando Marquez MD        lidocaine 1 % 0.1-1 mL  0.1-1 mL Other Q1H PRN Orlando Marquez MD        sodium chloride (PF) 0.9% PF flush 3 mL  3 mL Intracatheter Q8H Orlando Marquez MD        sodium chloride (PF) 0.9% PF flush 3 mL  3 mL Intracatheter q1 min prn Orlando Marquez MD           Allergies   Allergen Reactions    Lisinopril Cough       History of Anesthesia/Sedation Problems: no    PHYSICAL EXAMINATION:  Constitutional: aaox3, cooperative, pleasant  Vitals reviewed: /74   Pulse 58   Temp 97  F (36.1  C) (Temporal)   Resp 20   Wt 119.7 kg (264 lb)   SpO2 99%   BMI 36.82 kg/m    Wt:   Wt Readings from Last 2 Encounters:   03/31/25 119.7 kg (264 lb)   03/26/25 119.7 kg (264 lb)      Eyes: Sclera anicteric/injected  Ears/nose/mouth/throat: Normal oropharynx without ulcers or exudate, mucus membranes moist, hearing intact  Neck: supple, thyroid normal size  CV: No edema  Respiratory: Unlabored breathing  Lymph: No submandibular, supraclavicular or inguinal lymphadenopathy  Abd: Nondistended, no masses, nontender  Skin: warm, perfused, no jaundice  Psych: Normal affect  MSK: normal movement on limited exam.    ASA Score: See Provation note    Assessment/Plan:     The patient is an appropriate  candidate to receive sedation.    Informed consent was discussed with the patient/family, including the risks, benefits, potential complications and any alternative options associated with sedation.    Patient assessment completed just prior to sedation and while under constant observation by the provider. Condition determined to be adequate for proceeding with sedation.    The specific risks for the procedure were discussed with the patient at the time of informed consent and include but are not limited to perforation which could require surgery, missing significant neoplasm or lesion, hemorrhage and adverse sedative complication.      Aakash Anaya MD

## 2025-06-05 ENCOUNTER — LAB (OUTPATIENT)
Dept: LAB | Facility: CLINIC | Age: 65
End: 2025-06-05
Payer: COMMERCIAL

## 2025-06-05 DIAGNOSIS — E78.00 PURE HYPERCHOLESTEROLEMIA: ICD-10-CM

## 2025-07-01 ENCOUNTER — TRANSFERRED RECORDS (OUTPATIENT)
Dept: HEALTH INFORMATION MANAGEMENT | Facility: CLINIC | Age: 65
End: 2025-07-01
Payer: COMMERCIAL

## (undated) DEVICE — INTRODUCER SHEATH FAST-CATH CATH-LOCK 6FRX12CM 406701

## (undated) DEVICE — CATH THERMOCOOL SMARTTOUCH DF CURVE

## (undated) DEVICE — INTRODUCER SHEATH FAST-CATH 9FRX12CM 406116

## (undated) DEVICE — INTRO CATH 12CM 8.5FR FST-CATH

## (undated) DEVICE — KIT ENDO FIRST STEP DISINFECTANT 200ML W/POUCH EP-4

## (undated) DEVICE — NDL TRANSSEPTAL BRK XS 18GA 71CM BRK-1 CVD G407209

## (undated) DEVICE — CLIP HEMOSTASIS ASSURANCE W18 MM 00711885

## (undated) DEVICE — INTRO SHEATH MICRO PLATINUM TIP 4FRX40CM 7274

## (undated) DEVICE — PATCH CARTO 3 EXTERNAL REFERENCE 3D MAPPING CREFP6

## (undated) DEVICE — TUBE SET SMARKABLATE IRRIGATION

## (undated) DEVICE — INTRODUCER SHEATH FAST-CATH SWARTZ 8.5FRX63CM SL1 CVD 406849

## (undated) DEVICE — REPRO BARD EP XT DECAPL STRBL DX EP CATH 6F

## (undated) DEVICE — PAD CHUX UNDERPAD 23X24" 7136

## (undated) DEVICE — PAD DEFIBRILLATOR ELECTRODE 4.25INX6IN ADULT 2001M-C

## (undated) DEVICE — CATHETER EPT POLARIS X 6FR STEERABLE M0047003D0

## (undated) DEVICE — LIFTER SURGICAL ASCENDO SUBMUCOSAL LIFT AGENT BX00712934

## (undated) DEVICE — PACK HEART LEFT CUSTOM

## (undated) DEVICE — CATH THERMOCOOL SMARTTOUCH SF FJ CURVE

## (undated) DEVICE — INTRODUCER SHEATH FAST-CATH CATH-LOCK 7FRX12CM 406702

## (undated) DEVICE — CATH EP REPRO DAIG SPRM FX CRV DX EP C

## (undated) DEVICE — ESU GROUND PAD ADULT W/CORD E7507

## (undated) DEVICE — CATH EP MAP STR OD8FR 125CM 25

## (undated) DEVICE — KIT VENOUS FLUSH 60210177

## (undated) DEVICE — CATH SOUNDSTAR 8FRX90CM 10439011

## (undated) DEVICE — CATH NAV PENTARAY F CURVE

## (undated) DEVICE — SHEATH INTRO SWARTZ 8.5FR SL2 CURVE 63X80CM SUPERSTIFF

## (undated) DEVICE — CATHETER 7FR DEF LASSO NAV SH, 22P, 20MM, D D134904

## (undated) RX ORDER — HEPARIN SODIUM 1000 [USP'U]/ML
INJECTION, SOLUTION INTRAVENOUS; SUBCUTANEOUS
Status: DISPENSED
Start: 2021-11-24

## (undated) RX ORDER — HEPARIN SODIUM 10000 [USP'U]/100ML
INJECTION, SOLUTION INTRAVENOUS
Status: DISPENSED
Start: 2021-01-22

## (undated) RX ORDER — FENTANYL CITRATE 50 UG/ML
INJECTION, SOLUTION INTRAMUSCULAR; INTRAVENOUS
Status: DISPENSED
Start: 2021-11-19

## (undated) RX ORDER — ROCURONIUM BROMIDE 50 MG/5 ML
SYRINGE (ML) INTRAVENOUS
Status: DISPENSED
Start: 2021-11-19

## (undated) RX ORDER — LIDOCAINE HYDROCHLORIDE 20 MG/ML
INJECTION, SOLUTION EPIDURAL; INFILTRATION; INTRACAUDAL; PERINEURAL
Status: DISPENSED
Start: 2021-11-19

## (undated) RX ORDER — ACETAMINOPHEN 325 MG/1
TABLET ORAL
Status: DISPENSED
Start: 2021-11-24

## (undated) RX ORDER — DEXAMETHASONE SODIUM PHOSPHATE 4 MG/ML
INJECTION, SOLUTION INTRA-ARTICULAR; INTRALESIONAL; INTRAMUSCULAR; INTRAVENOUS; SOFT TISSUE
Status: DISPENSED
Start: 2021-01-22

## (undated) RX ORDER — FENTANYL CITRATE 50 UG/ML
INJECTION, SOLUTION INTRAMUSCULAR; INTRAVENOUS
Status: DISPENSED
Start: 2021-11-24

## (undated) RX ORDER — FENTANYL CITRATE 50 UG/ML
INJECTION, SOLUTION INTRAMUSCULAR; INTRAVENOUS
Status: DISPENSED
Start: 2021-01-22

## (undated) RX ORDER — PROTAMINE SULFATE 10 MG/ML
INJECTION, SOLUTION INTRAVENOUS
Status: DISPENSED
Start: 2021-11-24

## (undated) RX ORDER — ONDANSETRON 2 MG/ML
INJECTION INTRAMUSCULAR; INTRAVENOUS
Status: DISPENSED
Start: 2021-11-19

## (undated) RX ORDER — ONDANSETRON 2 MG/ML
INJECTION INTRAMUSCULAR; INTRAVENOUS
Status: DISPENSED
Start: 2021-11-24

## (undated) RX ORDER — GLYCOPYRROLATE 0.2 MG/ML
INJECTION, SOLUTION INTRAMUSCULAR; INTRAVENOUS
Status: DISPENSED
Start: 2021-11-19

## (undated) RX ORDER — FENTANYL CITRATE 50 UG/ML
INJECTION, SOLUTION INTRAMUSCULAR; INTRAVENOUS
Status: DISPENSED
Start: 2021-01-21

## (undated) RX ORDER — HEPARIN SODIUM 1000 [USP'U]/ML
INJECTION, SOLUTION INTRAVENOUS; SUBCUTANEOUS
Status: DISPENSED
Start: 2021-01-22

## (undated) RX ORDER — DEXAMETHASONE SODIUM PHOSPHATE 4 MG/ML
INJECTION, SOLUTION INTRA-ARTICULAR; INTRALESIONAL; INTRAMUSCULAR; INTRAVENOUS; SOFT TISSUE
Status: DISPENSED
Start: 2021-11-19

## (undated) RX ORDER — LIDOCAINE HYDROCHLORIDE 20 MG/ML
SOLUTION OROPHARYNGEAL
Status: DISPENSED
Start: 2021-01-21

## (undated) RX ORDER — PROPOFOL 10 MG/ML
INJECTION, EMULSION INTRAVENOUS
Status: DISPENSED
Start: 2021-11-24

## (undated) RX ORDER — ADENOSINE 3 MG/ML
INJECTION, SOLUTION INTRAVENOUS
Status: DISPENSED
Start: 2021-11-24

## (undated) RX ORDER — LIDOCAINE HYDROCHLORIDE 20 MG/ML
SOLUTION OROPHARYNGEAL
Status: DISPENSED
Start: 2021-11-18

## (undated) RX ORDER — FENTANYL CITRATE 50 UG/ML
INJECTION, SOLUTION INTRAMUSCULAR; INTRAVENOUS
Status: DISPENSED
Start: 2019-09-25

## (undated) RX ORDER — FENTANYL CITRATE 50 UG/ML
INJECTION, SOLUTION INTRAMUSCULAR; INTRAVENOUS
Status: DISPENSED
Start: 2024-09-30

## (undated) RX ORDER — LIDOCAINE HYDROCHLORIDE 20 MG/ML
SOLUTION OROPHARYNGEAL
Status: DISPENSED
Start: 2019-09-25

## (undated) RX ORDER — PROPOFOL 10 MG/ML
INJECTION, EMULSION INTRAVENOUS
Status: DISPENSED
Start: 2021-01-22

## (undated) RX ORDER — PROPOFOL 10 MG/ML
INJECTION, EMULSION INTRAVENOUS
Status: DISPENSED
Start: 2021-11-19

## (undated) RX ORDER — PROTAMINE SULFATE 10 MG/ML
INJECTION, SOLUTION INTRAVENOUS
Status: DISPENSED
Start: 2021-01-22

## (undated) RX ORDER — DEXAMETHASONE SODIUM PHOSPHATE 4 MG/ML
INJECTION, SOLUTION INTRA-ARTICULAR; INTRALESIONAL; INTRAMUSCULAR; INTRAVENOUS; SOFT TISSUE
Status: DISPENSED
Start: 2021-11-24

## (undated) RX ORDER — FENTANYL CITRATE 50 UG/ML
INJECTION, SOLUTION INTRAMUSCULAR; INTRAVENOUS
Status: DISPENSED
Start: 2021-11-18

## (undated) RX ORDER — ONDANSETRON 2 MG/ML
INJECTION INTRAMUSCULAR; INTRAVENOUS
Status: DISPENSED
Start: 2021-01-22